# Patient Record
Sex: MALE | Employment: FULL TIME | URBAN - NONMETROPOLITAN AREA
[De-identification: names, ages, dates, MRNs, and addresses within clinical notes are randomized per-mention and may not be internally consistent; named-entity substitution may affect disease eponyms.]

---

## 2017-01-30 ENCOUNTER — OFFICE VISIT (OUTPATIENT)
Dept: MEDICAL GROUP | Facility: PHYSICIAN GROUP | Age: 72
End: 2017-01-30
Payer: MEDICARE

## 2017-01-30 VITALS
HEART RATE: 75 BPM | SYSTOLIC BLOOD PRESSURE: 134 MMHG | WEIGHT: 177 LBS | HEIGHT: 69 IN | DIASTOLIC BLOOD PRESSURE: 80 MMHG | BODY MASS INDEX: 26.22 KG/M2 | TEMPERATURE: 98.2 F | OXYGEN SATURATION: 98 %

## 2017-01-30 DIAGNOSIS — N18.30 CHRONIC KIDNEY DISEASE (CKD), STAGE III (MODERATE) (HCC): ICD-10-CM

## 2017-01-30 DIAGNOSIS — Z00.00 MEDICARE ANNUAL WELLNESS VISIT, SUBSEQUENT: Primary | ICD-10-CM

## 2017-01-30 DIAGNOSIS — E55.9 VITAMIN D DEFICIENCY DISEASE: ICD-10-CM

## 2017-01-30 DIAGNOSIS — R79.89 ELEVATED TSH: ICD-10-CM

## 2017-01-30 DIAGNOSIS — K76.0 FATTY LIVER: ICD-10-CM

## 2017-01-30 DIAGNOSIS — N20.0 RECURRENT NEPHROLITHIASIS: Chronic | ICD-10-CM

## 2017-01-30 DIAGNOSIS — Z87.19 HISTORY OF GASTROESOPHAGEAL REFLUX (GERD): ICD-10-CM

## 2017-01-30 DIAGNOSIS — E78.1 HYPERTRIGLYCERIDEMIA: ICD-10-CM

## 2017-01-30 PROCEDURE — G8510 SCR DEP NEG, NO PLAN REQD: HCPCS | Performed by: NURSE PRACTITIONER

## 2017-01-30 PROCEDURE — G0439 PPPS, SUBSEQ VISIT: HCPCS | Performed by: NURSE PRACTITIONER

## 2017-01-30 PROCEDURE — 3045F PR MOST RECENT HEMOGLOBIN A1C LEVEL 7.0-9.0%: CPT | Performed by: NURSE PRACTITIONER

## 2017-01-30 PROCEDURE — 1036F TOBACCO NON-USER: CPT | Performed by: NURSE PRACTITIONER

## 2017-01-30 ASSESSMENT — PATIENT HEALTH QUESTIONNAIRE - PHQ9: CLINICAL INTERPRETATION OF PHQ2 SCORE: 0

## 2017-01-30 NOTE — ASSESSMENT & PLAN NOTE
Chronic condition managed with current medical regimen  Stable per review  Labs reviewed    Continue with current meds  Followed by Estela Boland M.D..

## 2017-01-30 NOTE — ASSESSMENT & PLAN NOTE
10/2016  GFR If  55 (A) >60 mL/min/1.73 m 2 Final      GFR If Non African American 45 (A) >60 mL/min/1.73 m 2 Final         Bun 21 8 - 22 mg/dL Final      Creatinine 1.52 (H) 0.50 - 1.40 mg/dL Final     Chronic condition managed with current medical regimen  Followed by Estela Boland M.D..

## 2017-01-30 NOTE — PATIENT INSTRUCTIONS
Continue with care through Estela Boland M.D..  Next Medicare Annual Wellness Visit is due in one year.    Continue care with specialists you are seeing for your chronic problems.    Attached is some preventative information for you to read.          Fall Prevention and Home Safety  Falls cause injuries and can affect all age groups. It is possible to prevent falls.   HOW TO PREVENT FALLS  · Wear shoes with rubber soles that do not have an opening for your toes.   · Keep the inside and outside of your house well lit.   · Use night lights throughout your home.   · Remove clutter from floors.   · Clean up floor spills.   · Remove throw rugs or fasten them to the floor with carpet tape.   · Do not place electrical cords across pathways.   · Put grab bars by your tub, shower, and toilet. Do not use towel bars as grab bars.   · Put handrails on both sides of the stairway. Fix loose handrails.   · Do not climb on stools or stepladders, if possible.   · Do not wax your floors.   · Repair uneven or unsafe sidewalks, walkways, or stairs.   · Keep items you use a lot within reach.   · Be aware of pets.   · Keep emergency numbers next to the telephone.   · Put smoke detectors in your home and near bedrooms.   Ask your doctor what other things you can do to prevent falls.  Document Released: 10/14/2010 Document Revised: 06/18/2013 Document Reviewed: 03/19/2013  ExitCare® Patient Information ©2013 [employer].    Exercise to Stay Healthy      Exercise helps you become and stay healthy.  EXERCISE IDEAS AND TIPS  Choose exercises that:  · You enjoy.   · Fit into your day.   You do not need to exercise really hard to be healthy. You can do exercises at a slow or medium level and stay healthy. You can:  · Stretch before and after working out.   · Try yoga, Pilates, or erick chi.   · Lift weights.   · Walk fast, swim, jog, run, climb stairs, bicycle, dance, or rollerskate.   · Take aerobic classes.   Exercises that burn about 150  calories:  · Running 1 ½ miles in 15 minutes.   · Playing volleyball for 45 to 60 minutes.   · Washing and waxing a car for 45 to 60 minutes.   · Playing touch football for 45 minutes.   · Walking 1 ¾ miles in 35 minutes.   · Pushing a stroller 1 ½ miles in 30 minutes.   · Playing basketball for 30 minutes.   · Raking leaves for 30 minutes.   · Bicycling 5 miles in 30 minutes.   · Walking 2 miles in 30 minutes.   · Dancing for 30 minutes.   · Shoveling snow for 15 minutes.   · Swimming laps for 20 minutes.   · Walking up stairs for 15 minutes.   · Bicycling 4 miles in 15 minutes.   · Gardening for 30 to 45 minutes.   · Jumping rope for 15 minutes.   · Washing windows or floors for 45 to 60 minutes.     One suggestion is to start walking for 10 minutes after each meal.  This will help with digestion and help you to metabolize your meal.       For Weight Loss -   Recommend portion sizes with more fruits/vegetables/high fiber foods.  Stay away from white bread/pastas/white rice/white potatoes.  Increase Fluid intake to 6-8 glasses of water daily.   Eliminate soda's (diet and regular) from your fluid intake.      Document Released: 01/20/2012 Document Revised: 03/11/2013 Document Reviewed: 01/20/2012  ExitCare® Patient Information ©2013 [employer].    Fat and Cholesterol Control Diet  Cholesterol is a wax-like substance. It comes from your liver and is found in certain foods. There is good (HDL) and bad (LDL) cholesterol. Too much cholesterol in your blood can affect your heart. Certain foods can lower or raise your cholesterol. Eat foods that are low in cholesterol.  Saturated and trans fats are bad fats found in foods that will raise your cholesterol. Do not eat foods that are high in saturated and trans fats.  FOODS HIGHER IN SATURATED AND TRANS FATS  · Dairy products, such as whole milk, eggs, cheese, cream, and butter.   · Fatty meats, such as hot dogs, sausage, and salami.   · Fried foods.   · Trans fats which  are found in margarine and pre-made cookies, crackers, and baked goods.   · Tropical oils, such as coconut and palm oils.   Read package labels at the store. Do not buy products that use saturated or trans fats or hydrogenated oils. Find foods labeled:  · Low-fat.   · Low-saturated fat.   · Trans-fat-free.   · Low-cholesterol.   FOODS LOWER IN CHOLESTEROL  ·  Fruit.   · Vegetables.   · Beans, peas, and lentils.   · Fish.   · Lean meat, such as chicken (without skin) or ground turkey.   · Grains, such as barley, rice, couscous, bulgur wheat, and pasta.   · Heart-healthy tub margarine.   PREPARING YOUR FOOD  · Broil, bake, steam, or roast foods. Do not cope food.   · Use non-stick cooking sprays.   · Use lemon or herbs to flavor food instead of using butter or stick margarine.   · Use nonfat yogurt, salsa, or low-fat dressings for salads.   Document Released: 06/18/2013 Document Reviewed: 06/18/2013  ExitCare® Patient Information ©2013 [employer].    Recommend annual flu vaccine.  Next due in Fall, 2015.  If you decide not to have the flu vaccine, recommend good handwashing and staying out of crowds during flu season.        Basic Carbohydrate Counting for Diabetes Mellitus  Carbohydrate counting is a method for keeping track of the amount of carbohydrates you eat. Eating carbohydrates naturally increases the level of sugar (glucose) in your blood, so it is important for you to know the amount that is okay for you to have in every meal. Carbohydrate counting helps keep the level of glucose in your blood within normal limits. The amount of carbohydrates allowed is different for every person. A dietitian can help you calculate the amount that is right for you. Once you know the amount of carbohydrates you can have, you can count the carbohydrates in the foods you want to eat.  Carbohydrates are found in the following foods:  · Grains, such as breads and cereals.  · Dried beans and soy products.  · Starchy vegetables,  "such as potatoes, peas, and corn.  · Fruit and fruit juices.  · Milk and yogurt.  · Sweets and snack foods, such as cake, cookies, candy, chips, soft drinks, and fruit drinks.  CARBOHYDRATE COUNTING  There are two ways to count the carbohydrates in your food. You can use either of the methods or a combination of both.  Reading the \"Nutrition Facts\" on Packaged Food  The \"Nutrition Facts\" is an area that is included on the labels of almost all packaged food and beverages in the [place]. It includes the serving size of that food or beverage and information about the nutrients in each serving of the food, including the grams (g) of carbohydrate per serving.   Decide the number of servings of this food or beverage that you will be able to eat or drink. Multiply that number of servings by the number of grams of carbohydrate that is listed on the label for that serving. The total will be the amount of carbohydrates you will be having when you eat or drink this food or beverage.  Learning Standard Serving Sizes of Food  When you eat food that is not packaged or does not include \"Nutrition Facts\" on the label, you need to measure the servings in order to count the amount of carbohydrates. A serving of most carbohydrate-rich foods contains about 15 g of carbohydrates. The following list includes serving sizes of carbohydrate-rich foods that provide 15 g of carbohydrate per serving:    · 1 slice of bread (1 oz) or 1 six-inch tortilla.    · ½ of a hamburger bun or English muffin.  · 4-6 crackers.  · ¾ cup unsweetened dry cereal.    · ½ cup hot cereal.  ·  cup rice or pasta.    · ½ cup mashed potatoes or ¼ of a large baked potato.  · 1 cup fresh fruit or one small piece of fruit.    · ½ cup canned or frozen fruit or fruit juice.  · 1 cup milk.  ·  cup plain fat-free yogurt or yogurt sweetened with artificial sweeteners.  · ½ cup cooked dried beans or starchy vegetable, such as peas, corn, or potatoes.    Decide the number " of standard-size servings that you will eat. Multiply that number of servings by 15 (the grams of carbohydrates in that serving). For example, if you eat 2 cups of strawberries, you will have eaten 2 servings and 30 g of carbohydrates (2 servings x 15 g = 30 g). For foods such as soups and casseroles, in which more than one food is mixed in, you will need to count the carbohydrates in each food that is included.  EXAMPLE OF CARBOHYDRATE COUNTING  Sample Dinner   · 3 oz chicken breast.  ·  cup of brown rice.  · ½ cup of corn.  · 1 cup milk.    · 1 cup strawberries with sugar-free whipped topping.    Carbohydrate Calculation   Step 1: Identify the foods that contain carbohydrates:   · Rice.    · Corn.    · Milk.    · Strawberries.  Step 2: Calculate the number of servings eaten of each:   · 2 servings of rice.    · 1 serving of corn.    · 1 serving of milk.    · 1 serving of strawberries.  Step 3:  Multiply each of those number of servings by 15 g:   · 2 servings of rice x 15 g = 30 g.    · 1 serving of corn x 15 g = 15 g.    · 1 serving of milk x 15 g = 15 g.    · 1 serving of strawberries x 15 g = 15 g.  Step 4: Add together all of the amounts to find the total grams of carbohydrates eaten: 30 g + 15 g + 15 g + 15 g = 75 g.     This information is not intended to replace advice given to you by your health care provider. Make sure you discuss any questions you have with your health care provider.     Document Released: 12/18/2006 Document Revised: 01/08/2016 Document Reviewed: 11/14/2014  [employer] Interactive Patient Education ©2016 [employer]

## 2017-01-30 NOTE — ASSESSMENT & PLAN NOTE
"Last occurrence within thde past wk, states passing \"sm stones\" pm his own  Believes reason for recent freq stones is due to dehydration due to diarrhea from metformin, pt is encouraged to contact Estela Boland M.D.   "

## 2017-01-30 NOTE — ASSESSMENT & PLAN NOTE
Chronic condition managed with current medical regimen  Home fasting glucose  Has had an improved A1C   Continue with current meds, states has diarrhea from metformin, encouraged to contact Estela Boland M.D.  Followed by Estela Boland M.D..

## 2017-01-30 NOTE — MR AVS SNAPSHOT
"        Rai Leija   2017 9:20 AM   Office Visit   MRN: [id number]    Department:  [facility]   Dept Phone:  [phone]    Description:  Male : 1945   Provider:  SUZETTE Constantino           Reason for Visit     Annual Exam subsequent      Allergies as of 2017     Allergen Noted Reactions    Ace Inhibitors 2011       Cough        You were diagnosed with     Medicare annual wellness visit, subsequent   [539434]  -  Primary     Recurrent nephrolithiasis   [975860]       Hypertriglyceridemia   [811464]       Uncontrolled type 2 diabetes mellitus with stage 3 chronic kidney disease, without long-term current use of insulin (CMS-[facility])   [6744739]       Fatty liver   [169202]       Vitamin D deficiency disease   [200256]       Chronic kidney disease (CKD), stage III (moderate)   [703713]       Elevated TSH   [625180]       History of gastroesophageal reflux (GERD)   [725536]         Vital Signs     Blood Pressure Pulse Temperature Height Weight Body Mass Index    134/80 mmHg 75 36.8 °C (98.2 °F) 1.753 m (5' 9.02\") 80.287 kg (177 lb) 26.13 kg/m2    Oxygen Saturation Smoking Status                98% Former Smoker          Basic Information     Date Of Birth Sex Race Ethnicity Preferred Language    1945 Male Unknown Unknown English      Your appointments     2017  7:40 AM   Established Patient with SUZETTE Hebert   [address] [address] [phone]           You will be receiving a confirmation call a few days before your appointment from our automated call confirmation system.              Problem List              ICD-10-CM Priority Class Noted - Resolved    Hyperlipidemia E78.5   2010 - Present    Hypertriglyceridemia E78.1   2010 - Present    Uncontrolled type 2 diabetes mellitus with stage 3 chronic kidney disease, without long-term current use of insulin (CMS-[facility]) E11.22, " E11.65, N18.3   12/1/2010 - Present    Fatty liver K76.0   5/25/2011 - Present    Vitamin D deficiency disease E55.9   8/28/2014 - Present    History of gastroesophageal reflux (GERD) Z87.19   2/11/2015 - Present    Recurrent nephrolithiasis (Chronic) N20.0   5/13/2015 - Present    Chronic kidney disease (CKD), stage III (moderate) N18.3   10/6/2016 - Present    Elevated TSH R94.6   10/6/2016 - Present      Health Maintenance        Date Due Completion Dates    DIABETES MONOFILAMENT / LE EXAM 5/13/2016 5/13/2015 (Done), 4/1/2014 (Done), 3/21/2013 (N/S)    Override on 5/13/2015: Done    Override on 4/1/2014: Done (4/4)    Override on 3/21/2013: (N/S)    IMM INFLUENZA (1) 1/10/2018 (Originally 9/1/2016) ---    IMM DTaP/Tdap/Td Vaccine (1 - Tdap) 1/10/2018 (Originally 1/16/1962) ---    IMM ZOSTER VACCINE 1/10/2018 (Originally 1/16/2003) ---    A1C SCREENING 4/4/2017 10/4/2016, 6/24/2016, 10/23/2015, 5/7/2015, 2/7/2015, 8/19/2014, 3/24/2014, 9/18/2013, 3/15/2013, 8/27/2012, 5/17/2012, 2/22/2012, 11/21/2011, 8/17/2011, 2/2/2011, 11/22/2010    FASTING LIPID PROFILE 6/24/2017 6/24/2016, 5/7/2015, 8/19/2014, 3/24/2014, 9/18/2013, 3/15/2013, 5/17/2012, 2/22/2012, 11/21/2011, 8/17/2011, 5/9/2011, 2/2/2011, 11/4/2010    URINE ACR / MICROALBUMIN 6/24/2017 6/24/2016, 8/19/2014, 9/18/2013, 3/15/2013, 8/17/2011, 5/9/2011, 2/2/2011    SERUM CREATININE 10/4/2017 10/4/2016, 6/24/2016, 10/23/2015, 5/7/2015, 2/7/2015, 8/19/2014, 5/7/2014, 3/24/2014, 9/18/2013, 3/15/2013, 8/27/2012, 5/17/2012, 2/22/2012, 11/21/2011, 8/17/2011, 5/9/2011, 2/2/2011, 11/4/2010    IMM PNEUMOCOCCAL 65+ (ADULT) LOW/MEDIUM RISK SERIES (2 of 2 - PPSV23) 10/6/2017 10/6/2016    RETINAL SCREENING 11/17/2017 11/17/2016, 10/20/2015 (Done), 3/1/2014 (Done), 1/21/2013 (N/S)    Override on 10/20/2015: Done    Override on 3/1/2014: Done    Override on 1/21/2013: (N/S)    COLONOSCOPY 3/21/2023 3/21/2013 (N/S)    Override on 3/21/2013: (N/S) (FIT test to be sent)            Current Immunizations     13-VALENT PCV PREVNAR 10/6/2016      Below and/or attached are the medications your provider expects you to take. Review all of your home medications and newly ordered medications with your provider and/or pharmacist. Follow medication instructions as directed by your provider and/or pharmacist. Please keep your medication list with you and share with your provider. Update the information when medications are discontinued, doses are changed, or new medications (including over-the-counter products) are added; and carry medication information at all times in the event of emergency situations     Allergies:  ACE INHIBITORS - (reactions not documented)               Medications  Valid as of: January 30, 2017 - 10:58 AM    Generic Name Brand Name Tablet Size Instructions for use    Aspirin (Tab) aspirin 81 MG Take 81 mg by mouth every day.        Cholecalciferol (Tab) cholecalciferol 1000 UNIT Take 1,000 Units by mouth every day.        Fenofibrate Micronized (Cap) LOFIBRA 134 MG TAKE 1 CAPSULE BY MOUTH EVERY MORNING BEFORE BREAKFAST        Glimepiride (Tab) AMARYL 2 MG Take 1 Tab by mouth every morning.        HydroCHLOROthiazide (Tab) HYDRODIURIL 12.5 MG Take 1 Tab by mouth every day.        MetFORMIN HCl (TABLET SR 24 HR) GLUCOPHAGE  MG Take 2 Tabs by mouth 2 times a day.        Omega-3 Fatty Acids (Cap) fish oil 1000 MG Take 1,000 mg by mouth 3 times a day, with meals.        Simvastatin (Tab) ZOCOR 40 MG TAKE 1 TABLET BY MOUTH EVERY EVENING        .                 Medicines prescribed today were sent to:     [facility] [id number] [place] [address] [address] AT [place] [address] [address] [place] [id number] [facility] [address]    Phone: [phone] Fax: [phone]    Open 24 Hours?: No      Medication refill instructions:       If your prescription bottle indicates you have medication refills left, it is not necessary to call your provider’s office. Please  contact your pharmacy and they will refill your medication.    If your prescription bottle indicates you do not have any refills left, you may request refills at any time through one of the following ways: The online Mix [employer] Meet system (except Urgent Care), by calling your provider’s office, or by asking your pharmacy to contact your provider’s office with a refill request. Medication refills are processed only during regular business hours and may not be available until the next business day. Your provider may request additional information or to have a follow-up visit with you prior to refilling your medication.   *Please Note: Medication refills are assigned a new Rx number when refilled electronically. Your pharmacy may indicate that no refills were authorized even though a new prescription for the same medication is available at the pharmacy. Please request the medicine by name with the pharmacy before contacting your provider for a refill.        Instructions    Continue with care through Estela Boland M.D..  Next Medicare Annual Wellness Visit is due in one year.    Continue care with specialists you are seeing for your chronic problems.    Attached is some preventative information for you to read.          Fall Prevention and Home Safety  Falls cause injuries and can affect all age groups. It is possible to prevent falls.   HOW TO PREVENT FALLS  · Wear shoes with rubber soles that do not have an opening for your toes.   · Keep the inside and outside of your house well lit.   · Use night lights throughout your home.   · Remove clutter from floors.   · Clean up floor spills.   · Remove throw rugs or fasten them to the floor with carpet tape.   · Do not place electrical cords across pathways.   · Put grab bars by your tub, shower, and toilet. Do not use towel bars as grab bars.   · Put handrails on both sides of the stairway. Fix loose handrails.   · Do not climb on stools or stepladders, if possible.   · Do not  wax your floors.   · Repair uneven or unsafe sidewalks, walkways, or stairs.   · Keep items you use a lot within reach.   · Be aware of pets.   · Keep emergency numbers next to the telephone.   · Put smoke detectors in your home and near bedrooms.   Ask your doctor what other things you can do to prevent falls.  Document Released: 10/14/2010 Document Revised: 06/18/2013 Document Reviewed: 03/19/2013  ExitCare® Patient Information ©2013 [employer] [facility].    Exercise to Stay Healthy      Exercise helps you become and stay healthy.  EXERCISE IDEAS AND TIPS  Choose exercises that:  · You enjoy.   · Fit into your day.   You do not need to exercise really hard to be healthy. You can do exercises at a slow or medium level and stay healthy. You can:  · Stretch before and after working out.   · Try yoga, Pilates, or erick chi.   · Lift weights.   · Walk fast, swim, jog, run, climb stairs, bicycle, dance, or rollerskate.   · Take aerobic classes.   Exercises that burn about 150 calories:  · Running 1 ½ miles in 15 minutes.   · Playing volleyball for 45 to 60 minutes.   · Washing and waxing a car for 45 to 60 minutes.   · Playing touch football for 45 minutes.   · Walking 1 ¾ miles in 35 minutes.   · Pushing a stroller 1 ½ miles in 30 minutes.   · Playing basketball for 30 minutes.   · Raking leaves for 30 minutes.   · Bicycling 5 miles in 30 minutes.   · Walking 2 miles in 30 minutes.   · Dancing for 30 minutes.   · Shoveling snow for 15 minutes.   · Swimming laps for 20 minutes.   · Walking up stairs for 15 minutes.   · Bicycling 4 miles in 15 minutes.   · Gardening for 30 to 45 minutes.   · Jumping rope for 15 minutes.   · Washing windows or floors for 45 to 60 minutes.     One suggestion is to start walking for 10 minutes after each meal.  This will help with digestion and help you to metabolize your meal.       For Weight Loss -   Recommend portion sizes with more fruits/vegetables/high fiber foods.  Stay away from white  bread/pastas/white rice/white potatoes.  Increase Fluid intake to 6-8 glasses of water daily.   Eliminate soda's (diet and regular) from your fluid intake.      Document Released: 01/20/2012 Document Revised: 03/11/2013 Document Reviewed: 01/20/2012  [facility]® Patient Information ©2013 [employer] [facility].    Fat and Cholesterol Control Diet  Cholesterol is a wax-like substance. It comes from your liver and is found in certain foods. There is good (HDL) and bad (LDL) cholesterol. Too much cholesterol in your blood can affect your heart. Certain foods can lower or raise your cholesterol. Eat foods that are low in cholesterol.  Saturated and trans fats are bad fats found in foods that will raise your cholesterol. Do not eat foods that are high in saturated and trans fats.  FOODS HIGHER IN SATURATED AND TRANS FATS  · Dairy products, such as whole milk, eggs, cheese, cream, and butter.   · Fatty meats, such as hot dogs, sausage, and salami.   · Fried foods.   · Trans fats which are found in margarine and pre-made cookies, crackers, and baked goods.   · Tropical oils, such as coconut and palm oils.   Read package labels at the store. Do not buy products that use saturated or trans fats or hydrogenated oils. Find foods labeled:  · Low-fat.   · Low-saturated fat.   · Trans-fat-free.   · Low-cholesterol.   FOODS LOWER IN CHOLESTEROL  ·  Fruit.   · Vegetables.   · Beans, peas, and lentils.   · Fish.   · Lean meat, such as chicken (without skin) or ground turkey.   · Grains, such as barley, rice, couscous, bulgur wheat, and pasta.   · Heart-healthy tub margarine.   PREPARING YOUR FOOD  · Broil, bake, steam, or roast foods. Do not cope food.   · Use non-stick cooking sprays.   · Use lemon or herbs to flavor food instead of using butter or stick margarine.   · Use nonfat yogurt, salsa, or low-fat dressings for salads.   Document Released: 06/18/2013 Document Reviewed: 06/18/2013  ExitCare® Patient Information ©2013 [employer].    Recommend annual flu vaccine.  Next due in Fall, 2015.  If you decide not to have the flu vaccine, recommend good handwashing and staying out of crowds during flu season.        Basic Carbohydrate Counting for Diabetes Mellitus  Carbohydrate counting is a method for keeping track of the amount of carbohydrates you eat. Eating carbohydrates naturally increases the level of sugar (glucose) in your blood, so it is important for you to know the amount that is okay for you to have in every meal. Carbohydrate counting helps keep the level of glucose in your blood within normal limits. The amount of carbohydrates allowed is different for every person. A dietitian can help you calculate the amount that is right for you. Once you know the amount of carbohydrates you can have, you can count the carbohydrates in the foods you want to eat.  Carbohydrates are found in the following foods:  · Grains, such as breads and cereals.  · Dried beans and soy products.  · Starchy vegetables, such as potatoes, peas, and corn.  · Fruit and fruit juices.  · Milk and yogurt.  · Sweets and snack foods, such as cake, cookies, candy, chips, soft drinks, and fruit drinks.  CARBOHYDRATE COUNTING  There are two ways to count the carbohydrates in your food. You can use either of the methods or a combination of both.  Reading the \"Nutrition Facts\" on Packaged Food  The \"Nutrition Facts\" is an area that is included on the labels of almost all packaged food and beverages in the [place]. It includes the serving size of that food or beverage and information about the nutrients in each serving of the food, including the grams (g) of carbohydrate per serving.   Decide the number of servings of this food or beverage that you will be able to eat or drink. Multiply that number of servings by the number of grams of carbohydrate that is listed on the label for that serving. The total will be the amount of carbohydrates you will be having when you " "eat or drink this food or beverage.  Learning Standard Serving Sizes of Food  When you eat food that is not packaged or does not include \"Nutrition Facts\" on the label, you need to measure the servings in order to count the amount of carbohydrates. A serving of most carbohydrate-rich foods contains about 15 g of carbohydrates. The following list includes serving sizes of carbohydrate-rich foods that provide 15 g of carbohydrate per serving:    · 1 slice of bread (1 oz) or 1 six-inch tortilla.    · ½ of a hamburger bun or English muffin.  · 4-6 crackers.  · ¾ cup unsweetened dry cereal.    · ½ cup hot cereal.  ·  cup rice or pasta.    · ½ cup mashed potatoes or ¼ of a large baked potato.  · 1 cup fresh fruit or one small piece of fruit.    · ½ cup canned or frozen fruit or fruit juice.  · 1 cup milk.  ·  cup plain fat-free yogurt or yogurt sweetened with artificial sweeteners.  · ½ cup cooked dried beans or starchy vegetable, such as peas, corn, or potatoes.    Decide the number of standard-size servings that you will eat. Multiply that number of servings by 15 (the grams of carbohydrates in that serving). For example, if you eat 2 cups of strawberries, you will have eaten 2 servings and 30 g of carbohydrates (2 servings x 15 g = 30 g). For foods such as soups and casseroles, in which more than one food is mixed in, you will need to count the carbohydrates in each food that is included.  EXAMPLE OF CARBOHYDRATE COUNTING  Sample Dinner   · 3 oz chicken breast.  ·  cup of brown rice.  · ½ cup of corn.  · 1 cup milk.    · 1 cup strawberries with sugar-free whipped topping.    Carbohydrate Calculation   Step 1: Identify the foods that contain carbohydrates:   · Rice.    · Corn.    · Milk.    · Strawberries.  Step 2: Calculate the number of servings eaten of each:   · 2 servings of rice.    · 1 serving of corn.    · 1 serving of milk.    · 1 serving of strawberries.  Step 3:  Multiply each of those number of servings by " 15 g:   · 2 servings of rice x 15 g = 30 g.    · 1 serving of corn x 15 g = 15 g.    · 1 serving of milk x 15 g = 15 g.    · 1 serving of strawberries x 15 g = 15 g.  Step 4: Add together all of the amounts to find the total grams of carbohydrates eaten: 30 g + 15 g + 15 g + 15 g = 75 g.     This information is not intended to replace advice given to you by your health care provider. Make sure you discuss any questions you have with your health care provider.     Document Released: 12/18/2006 Document Revised: 01/08/2016 Document Reviewed: 11/14/2014  [employer] Interactive Patient Education ©2016 [employer]            [employer] Access Code: Activation code not generated  Current [employer] Status: Active

## 2017-01-30 NOTE — ASSESSMENT & PLAN NOTE
Chronic condition managed with current medical regimen  Stable per review  Followed by Estela Boland M.D..

## 2017-01-30 NOTE — ASSESSMENT & PLAN NOTE
Chronic condition managed with current medical regimen  Stable per review   Continue with current meds  Followed by Estela Boland M.D..

## 2017-01-30 NOTE — PROGRESS NOTES
CC:   Medicare Annual Wellness Visit    HPI:  Rai is a 72 y.o. here for Medicare Annual Wellness Visit    Patient Active Problem List    Diagnosis Date Noted   • Chronic kidney disease (CKD), stage III (moderate) 10/06/2016   • Elevated TSH 10/06/2016   • Recurrent nephrolithiasis 05/13/2015   • History of gastroesophageal reflux (GERD) 02/11/2015   • Vitamin D deficiency disease 08/28/2014   • Fatty liver 05/25/2011   • Hyperlipidemia 12/01/2010   • Hypertriglyceridemia 12/01/2010   • Uncontrolled type 2 diabetes mellitus with stage 3 chronic kidney disease, without long-term current use of insulin (CMS-[facility]) 12/01/2010     Current Outpatient Prescriptions   Medication Sig Dispense Refill   • fenofibrate micronized (LOFIBRA) 134 MG capsule TAKE 1 CAPSULE BY MOUTH EVERY MORNING BEFORE BREAKFAST 90 Cap 3   • hydrochlorothiazide (HYDRODIURIL) 12.5 MG tablet Take 1 Tab by mouth every day. 90 Tab 3   • metformin ER (GLUCOPHAGE XR) 500 MG TABLET SR 24 HR Take 2 Tabs by mouth 2 times a day. 360 Tab 3   • glimepiride (AMARYL) 2 MG Tab Take 1 Tab by mouth every morning. 90 Tab 3   • simvastatin (ZOCOR) 40 MG Tab TAKE 1 TABLET BY MOUTH EVERY EVENING 90 Tab 3   • vitamin D (CHOLECALCIFEROL) 1000 UNIT Tab Take 1,000 Units by mouth every day.     • Omega-3 Fatty Acids (FISH OIL) 1000 MG Cap capsule Take 1,000 mg by mouth 3 times a day, with meals.     • aspirin 81 MG tablet Take 81 mg by mouth every day.       No current facility-administered medications for this visit.      Current supplements: no  Chronic narcotic pain medicines: no  Allergies: Ace inhibitors  Exercise: yes active  Current social contact/activities: Has support of family and friends      Screening:  Depression Screening    Little interest or pleasure in doing things?  0 - not at all  Feeling down, depressed , or hopeless? 0 - not at all  Trouble falling or staying asleep, or sleeping too much?     Feeling tired or having little energy?     Poor appetite or  overeating?     Feeling bad about yourself - or that you are a failure or have let yourself or your family down?    Trouble concentrating on things, such as reading the newspaper or watching television?    Moving or speaking so slowly that other people could have noticed.  Or the opposite - being so fidgety or restless that you have been moving around a lot more than usual?     Thoughts that you would be better off dead, or of hurting yourself?     Patient Health Questionnaire Score:      If depressive symptoms identified deferred to follow up visit unless specifically addressed in assesment and plan.      Screening for Cognitive Impairment    Three Minute Recall (banana, sunrise, fence)  3/3    Draw clock face with all 12 numbers set to the hand to show 10 minures past 11 o'clock  1 5  Cognitive concerns identified defferred for follow up unless specifically addressed in assesment and plan.    Fall Risk Assessment    Has the patient had two or more falls in the last year or any fall with injury in the last year?  No    Safety Assessment    Throw rugs on floor.  Yes  Handrails on all stairs.  Yes  Good lighting in all hallways.  Yes  Difficulty hearing.  No  Patient counseled about all safety risks that were identified.    Functional Assessment ADLs    Are there any barriers preventing you from cooking for yourself or meeting nutritional needs?  No.    Are there any barriers preventing you from driving safely or obtaining transportation?  No.    Are there any barriers preventing you from using a telephone or calling for help?  No.    Are there any barriers preventing you from shopping?  No.    Are there any barriers preventing you from taking care of your own finances?  No.    Are there any barriers preventing you from managing your medications?  No.    Are currently engaing any exercise or physical activity?  No.       Health Maintenance Summary                DIABETES MONOFILAMENT / LE EXAM Overdue 5/13/2016       Done 5/13/2015      Patient has more history with this topic...    Annual Wellness Visit Overdue 10/26/2016      Done 10/26/2015      Patient has more history with this topic...    IMM INFLUENZA Postponed 1/10/2018 Originally 9/1/2016. Patient Refused    IMM DTaP/Tdap/Td Vaccine Postponed 1/10/2018 Originally 1/16/1962. Patient Refused    IMM ZOSTER VACCINE Postponed 1/10/2018 Originally 1/16/2003. Patient Refused    A1C SCREENING Next Due 4/4/2017      Done 10/4/2016 HEMOGLOBIN A1C (A)     Patient has more history with this topic...    FASTING LIPID PROFILE Next Due 6/24/2017      Done 6/24/2016 LIPID PROFILE (A)     Patient has more history with this topic...    URINE ACR / MICROALBUMIN Next Due 6/24/2017      Done 6/24/2016 MICROALBUMIN CREAT RATIO URINE     Patient has more history with this topic...    SERUM CREATININE Next Due 10/4/2017      Done 10/4/2016 COMP METABOLIC PANEL (A)     Patient has more history with this topic...    IMM PNEUMOCOCCAL 65+ (ADULT) LOW/MEDIUM RISK SERIES Next Due 10/6/2017      Done 10/6/2016 Imm Admin: Pneumococcal Conjugate Vaccine (Prevnar/PCV-13)    RETINAL SCREENING Next Due 11/17/2017      Done 11/17/2016 REFERRAL FOR RETINAL SCREENING EXAM     Patient has more history with this topic...    COLONOSCOPY Next Due 3/21/2023      Not specified 3/21/2013 FIT test to be sent          Patient Care Team:  Estela Boland M.D. as PCP - General  Daniel Ko M.D. as Consulting Physician (Urology)        Social History   Substance Use Topics   • Smoking status: Former Smoker -- 0.50 packs/day for 20 years     Types: Cigarettes     Quit date: 10/26/1986   • Smokeless tobacco: Never Used      Comment: quit 23 years ago   • Alcohol Use: 0.0 oz/week     0 Standard drinks or equivalent per week      Comment: very rarely       Family History   Problem Relation Age of Onset   • GI Father      polyps   • No Known Problems Mother      He  has a past medical history of Diabetes mellitus  "type II; Elevated BP (9/9/2010); Hyperlipidemia (12/1/2010); Hypertriglyceridemia (12/1/2010); Transaminitis (4/12/2011); Renal stone; and Hearing decreased.   Past Surgical History   Procedure Laterality Date   • Other       finger   • Tonsillectomy     • Lithotripsy       renal stone removal       ROS:    Ostomy or other tubes or amputations: no  Chronic oxygen use no  Last eye exam 2016  : Denies incontinence.   Gait: Uses no assistive device   Hearing excellent.    Dentition good    Exam: Blood pressure 134/80, pulse 75, temperature 36.8 °C (98.2 °F), height 1.753 m (5' 9.02\"), weight 80.287 kg (177 lb), SpO2 98 %. Body mass index is 26.13 kg/(m^2).  Alert, oriented in no acute distress.  Eye contact is good, speech goal directed, affect calm      Assessment and Plan. The following treatment and monitoring plan is recommended for all problems as listed below:   Recurrent nephrolithiasis  Last occurrence within thde past wk, states passing \"sm stones\" pm his own  Believes reason for recent freq stones is due to dehydration due to diarrhea from metformin, pt is encouraged to contact Estela Boland M.D.     Hyperlipidemia  Chronic condition managed with current medical regimen  Stable per review  Labs reviewed    Continue with current meds  Followed by Estela Boland M.D..        Hypertriglyceridemia  Chronic condition managed with current medical regimen  Stable per review  Labs reviewed    Continue with current meds  Followed by Estela Boland M.D..        Uncontrolled type 2 diabetes mellitus with stage 3 chronic kidney disease, without long-term current use of insulin (CMS-[facility])  Chronic condition managed with current medical regimen  Home fasting glucose  Has had an improved A1C   Continue with current meds, states has diarrhea from metformin, encouraged to contact Estela Boland M.D.  Followed by Estela Boland M.D..        Fatty liver  Chronic condition managed with current medical regimen  Stable per " review  Followed by Estela Boland M.D..        Vitamin D deficiency disease  Chronic condition managed with current medical regimen  Stable per review   Continue with current meds  Followed by Estela Boland M.D..        History of gastroesophageal reflux (GERD)  Chronic condition managed with current medical regimen  Stable per review  Per pt resolved with short run of OTC prilosec  Followed by Estela Boland M.D..        Chronic kidney disease (CKD), stage III (moderate)  10/2016  GFR If African American 55 (A) >60 mL/min/1.73 m 2 Final      GFR If Non African American 45 (A) >60 mL/min/1.73 m 2 Final         Bun 21 8 - 22 mg/dL Final      Creatinine 1.52 (H) 0.50 - 1.40 mg/dL Final     Chronic condition managed with current medical regimen  Followed by Estela Boland M.D..      Elevated TSH  10/2016     TSH 4.150 (H) 0.300 - 3.700 uIU/mL Final         Toenail bruise  Per pt resolved  Historical data        Health Care Screening recommendations reviewed with patient today: per Patient Instructions  DPA/Advanced directive: .has a living will with med will    Next office visit for recheck of chronic medical conditions is due with Estela Boland M.D. in 4/7/2017

## 2017-01-30 NOTE — ASSESSMENT & PLAN NOTE
Chronic condition managed with current medical regimen  Stable per review  Per pt resolved with short run of OTC prilosec  Followed by Estela Boland M.D..

## 2017-04-07 ENCOUNTER — TELEPHONE (OUTPATIENT)
Dept: URGENT CARE | Facility: PHYSICIAN GROUP | Age: 72
End: 2017-04-07

## 2017-04-07 NOTE — TELEPHONE ENCOUNTER
Pt came in at 800 for a 740 appoinmtent, pt stated he had not had his labs done, he stated that the paper stated 8 am but he did not bring in paper. Pt came in to rescedule and the next appointment for a N2U is July, pt was angry and left without stating if he wanted the appt. Or not

## 2017-04-10 ENCOUNTER — HOSPITAL ENCOUNTER (OUTPATIENT)
Dept: LAB | Facility: MEDICAL CENTER | Age: 72
End: 2017-04-10
Attending: INTERNAL MEDICINE
Payer: MEDICARE

## 2017-04-10 DIAGNOSIS — R79.89 ELEVATED TSH: ICD-10-CM

## 2017-04-10 DIAGNOSIS — E78.00 PURE HYPERCHOLESTEROLEMIA: ICD-10-CM

## 2017-04-10 LAB
ALBUMIN SERPL BCP-MCNC: 4.6 G/DL (ref 3.2–4.9)
ALBUMIN/GLOB SERPL: 1.4 G/DL
ALP SERPL-CCNC: 125 U/L (ref 30–99)
ALT SERPL-CCNC: 24 U/L (ref 2–50)
ANION GAP SERPL CALC-SCNC: 8 MMOL/L (ref 0–11.9)
AST SERPL-CCNC: 20 U/L (ref 12–45)
BILIRUB SERPL-MCNC: 0.5 MG/DL (ref 0.1–1.5)
BUN SERPL-MCNC: 31 MG/DL (ref 8–22)
CALCIUM SERPL-MCNC: 9.9 MG/DL (ref 8.5–10.5)
CHLORIDE SERPL-SCNC: 102 MMOL/L (ref 96–112)
CHOLEST SERPL-MCNC: 264 MG/DL (ref 100–199)
CO2 SERPL-SCNC: 25 MMOL/L (ref 20–33)
CREAT SERPL-MCNC: 1.42 MG/DL (ref 0.5–1.4)
EST. AVERAGE GLUCOSE BLD GHB EST-MCNC: 212 MG/DL
GFR SERPL CREATININE-BSD FRML MDRD: 49 ML/MIN/1.73 M 2
GLOBULIN SER CALC-MCNC: 3.2 G/DL (ref 1.9–3.5)
GLUCOSE SERPL-MCNC: 321 MG/DL (ref 65–99)
HBA1C MFR BLD: 9 % (ref 0–5.6)
HDLC SERPL-MCNC: ABNORMAL MG/DL
LDLC SERPL CALC-MCNC: ABNORMAL MG/DL
POTASSIUM SERPL-SCNC: 4 MMOL/L (ref 3.6–5.5)
PROT SERPL-MCNC: 7.8 G/DL (ref 6–8.2)
SODIUM SERPL-SCNC: 135 MMOL/L (ref 135–145)
T4 FREE SERPL-MCNC: 0.68 NG/DL (ref 0.53–1.43)
TRIGL SERPL-MCNC: 1135 MG/DL (ref 0–149)
TSH SERPL DL<=0.005 MIU/L-ACNC: 4.85 UIU/ML (ref 0.3–3.7)

## 2017-04-10 PROCEDURE — 80053 COMPREHEN METABOLIC PANEL: CPT

## 2017-04-10 PROCEDURE — 80061 LIPID PANEL: CPT

## 2017-04-10 PROCEDURE — 36415 COLL VENOUS BLD VENIPUNCTURE: CPT | Mod: GA

## 2017-04-10 PROCEDURE — 84443 ASSAY THYROID STIM HORMONE: CPT

## 2017-04-10 PROCEDURE — 84439 ASSAY OF FREE THYROXINE: CPT

## 2017-04-10 PROCEDURE — 83036 HEMOGLOBIN GLYCOSYLATED A1C: CPT | Mod: GA

## 2017-04-13 ENCOUNTER — OFFICE VISIT (OUTPATIENT)
Dept: MEDICAL GROUP | Facility: PHYSICIAN GROUP | Age: 72
End: 2017-04-13
Payer: MEDICARE

## 2017-04-13 VITALS
DIASTOLIC BLOOD PRESSURE: 88 MMHG | WEIGHT: 180 LBS | RESPIRATION RATE: 16 BRPM | HEART RATE: 65 BPM | TEMPERATURE: 98.4 F | HEIGHT: 68 IN | SYSTOLIC BLOOD PRESSURE: 138 MMHG | BODY MASS INDEX: 27.28 KG/M2 | OXYGEN SATURATION: 97 %

## 2017-04-13 DIAGNOSIS — E78.01 FAMILIAL HYPERCHOLESTEROLEMIA: ICD-10-CM

## 2017-04-13 DIAGNOSIS — E55.9 VITAMIN D DEFICIENCY DISEASE: ICD-10-CM

## 2017-04-13 DIAGNOSIS — R79.89 ELEVATED TSH: ICD-10-CM

## 2017-04-13 DIAGNOSIS — E78.1 HYPERTRIGLYCERIDEMIA: ICD-10-CM

## 2017-04-13 DIAGNOSIS — I10 ESSENTIAL HYPERTENSION: ICD-10-CM

## 2017-04-13 DIAGNOSIS — N20.0 RECURRENT NEPHROLITHIASIS: Chronic | ICD-10-CM

## 2017-04-13 DIAGNOSIS — R19.7 DIARRHEA, UNSPECIFIED TYPE: ICD-10-CM

## 2017-04-13 PROCEDURE — 1101F PT FALLS ASSESS-DOCD LE1/YR: CPT | Performed by: FAMILY MEDICINE

## 2017-04-13 PROCEDURE — 99214 OFFICE O/P EST MOD 30 MIN: CPT | Performed by: FAMILY MEDICINE

## 2017-04-13 PROCEDURE — G8432 DEP SCR NOT DOC, RNG: HCPCS | Performed by: FAMILY MEDICINE

## 2017-04-13 PROCEDURE — 1036F TOBACCO NON-USER: CPT | Performed by: FAMILY MEDICINE

## 2017-04-13 PROCEDURE — G8417 CALC BMI ABV UP PARAM F/U: HCPCS | Performed by: FAMILY MEDICINE

## 2017-04-13 PROCEDURE — 4040F PNEUMOC VAC/ADMIN/RCVD: CPT | Performed by: FAMILY MEDICINE

## 2017-04-13 PROCEDURE — 3045F PR MOST RECENT HEMOGLOBIN A1C LEVEL 7.0-9.0%: CPT | Performed by: FAMILY MEDICINE

## 2017-04-13 RX ORDER — GLIMEPIRIDE 2 MG/1
2 TABLET ORAL EVERY MORNING
Qty: 90 TAB | Refills: 3 | Status: SHIPPED | OUTPATIENT
Start: 2017-04-13 | End: 2018-02-22 | Stop reason: SDUPTHER

## 2017-04-13 RX ORDER — HYDROCHLOROTHIAZIDE 12.5 MG/1
12.5 TABLET ORAL DAILY
Qty: 90 TAB | Refills: 3 | Status: SHIPPED | OUTPATIENT
Start: 2017-04-13 | End: 2018-02-22

## 2017-04-13 RX ORDER — SIMVASTATIN 40 MG
TABLET ORAL
Qty: 90 TAB | Refills: 3 | Status: SHIPPED | OUTPATIENT
Start: 2017-04-13 | End: 2018-02-22 | Stop reason: SDUPTHER

## 2017-04-13 RX ORDER — FENOFIBRATE 134 MG/1
CAPSULE ORAL
Qty: 90 CAP | Refills: 3 | Status: SHIPPED | OUTPATIENT
Start: 2017-04-13 | End: 2018-02-22 | Stop reason: SDUPTHER

## 2017-04-13 ASSESSMENT — PATIENT HEALTH QUESTIONNAIRE - PHQ9: CLINICAL INTERPRETATION OF PHQ2 SCORE: 0

## 2017-04-13 NOTE — PROGRESS NOTES
Subjective:   Rai Leija is a 72 y.o. male here today for diabetes, hyperlipidemia and hypertrigliceridemia.   He stopped all his medications a month ago as he had gradually developed severe diarrhea which prevented him from even going out of the house. The diarrhea has since improved and has almost resolved.    Hyperlipidemia  Restart [id number] mg daily, reports no muscle cramping or weakness.    Hypertriglyceridemia  TG 1135 on 4/10/17   Restart statin, fenofibrate and fish oil.       Uncontrolled type 2 diabetes mellitus with stage 3 chronic kidney disease, without long-term current use of insulin (CMS-[facility])  A1c increased to 9 on 4/10/17. He stopped metformin and glimepiride for a month due to diarrhea. Diarrhea has almost resolved.  Restart glimepiride and adding januvia.    Vitamin D deficiency disease  Will recheck labs      Elevated TSH  Persistent elevation.  T4 is normal.   Will stabilize other issues and recheck TSH and T4 in 6 months.     HTN (hypertension)  Controlled on HCTZ    Diarrhea  Acute new problem. Started gradually. He stopped all medications a month ago and since then diarrhea has improved though not resolved completely. Occasional blood noticed in stool.   Diarrhea could be due to metformin so since it is improving, advised pt to start all other meds and monitor. If on repeat visit still has diarrhea or blood in stool will recommend a colonoscopy.          Current medicines (including changes today)  Current Outpatient Prescriptions   Medication Sig Dispense Refill   • aspirin 81 MG tablet Take 1 Tab by mouth every day. 100 Tab 3   • simvastatin (ZOCOR) 40 MG Tab TAKE 1 TABLET BY MOUTH EVERY EVENING 90 Tab 3   • fenofibrate micronized (LOFIBRA) 134 MG capsule TAKE 1 CAPSULE BY MOUTH EVERY MORNING BEFORE BREAKFAST 90 Cap 3   • glimepiride (AMARYL) 2 MG Tab Take 1 Tab by mouth every morning. 90 Tab 3   • hydrochlorothiazide (HYDRODIURIL) 12.5 MG tablet Take 1 Tab by mouth every day. 90  "Tab 3   • sitagliptin (JANUVIA) 50 MG Tab Take 1 Tab by mouth every day. 30 Tab 3   • metformin ER (GLUCOPHAGE XR) 500 MG TABLET SR 24 HR Take 2 Tabs by mouth 2 times a day. 360 Tab 3   • vitamin D (CHOLECALCIFEROL) 1000 UNIT Tab Take 1,000 Units by mouth every day.     • Omega-3 Fatty Acids (FISH OIL) 1000 MG Cap capsule Take 1,000 mg by mouth 3 times a day, with meals.       No current facility-administered medications for this visit.     He  has a past medical history of Diabetes mellitus type II; Elevated BP (9/9/2010); Hyperlipidemia (12/1/2010); Hypertriglyceridemia (12/1/2010); Transaminitis (4/12/2011); Renal stone; and Hearing decreased.    ROS   No chest pain, no shortness of breath, no abdominal pain       Objective:     Blood pressure 138/88, pulse 65, temperature 36.9 °C (98.4 °F), resp. rate 16, height 1.727 m (5' 7.99\"), weight 81.647 kg (180 lb), SpO2 97 %. Body mass index is 27.38 kg/(m^2).   Physical Exam:  Constitutional: Alert, no distress.  Skin: Warm, dry, good turgor, no rashes in visible areas.  Eye: Equal, round and reactive, conjunctiva clear, lids normal.  ENMT: Lips without lesions, good dentition, oropharynx clear.  Neck: Trachea midline, no masses, no thyromegaly. No cervical or supraclavicular lymphadenopathy  Respiratory: Unlabored respiratory effort, lungs clear to auscultation, no wheezes, no ronchi.  Cardiovascular: Normal S1, S2, no murmur, no edema.  Abdomen: Soft, non-tender, no masses, no hepatosplenomegaly.  Psych: Alert and oriented x3, normal affect and mood.        Assessment and Plan:   The following treatment plan was discussed  1. Diarrhea, unspecified type  Stop metformin. Will order stool studies and  Refer to GI for colonoscopy if not resolved at next visit. May need colonoscopy ordered for crc screening anyway.      2. Uncontrolled type 2 diabetes mellitus with complication, without long-term current use of insulin (CMS-[facility])  Stop metforin,, continue glimepiride and " start Januvia. Check labs  Continue aspirin and statin.  - glimepiride (AMARYL) 2 MG Tab; Take 1 Tab by mouth every morning.  Dispense: 90 Tab; Refill: 3  - sitagliptin (JANUVIA) 50 MG Tab; Take 1 Tab by mouth every day.  Dispense: 30 Tab; Refill: 3  - HEMOGLOBIN A1C; Future  - COMP METABOLIC PANEL; Future  - MICROALBUMIN CREAT RATIO URINE (CLINIC COLLECT); Future    3. Hypertriglyceridemia  Uncontrolled. Restart medication, check labs.  - fenofibrate micronized (LOFIBRA) 134 MG capsule; TAKE 1 CAPSULE BY MOUTH EVERY MORNING BEFORE BREAKFAST  Dispense: 90 Cap; Refill: 3    4. Essential hypertension  Controlled. Continue hydrochlorothiazide  Check labs  - CBC WITH DIFFERENTIAL; Future    5. Familial hypercholesterolemia  Uncontrolled. Restart simvastatin. Check labs  - simvastatin (ZOCOR) 40 MG Tab; TAKE 1 TABLET BY MOUTH EVERY EVENING  Dispense: 90 Tab; Refill: 3  - LIPID PROFILE; Future    6. Elevated TSH  Monitor with labs. If TSH persistently elevated, consider adding lowest dose of levothyroxine.  - TSH; Future  - FREE THYROXINE; Future    7. Recurrent nephrolithiasis  Continue good hydration        Followup: Return in about 3 months (around 7/13/2017) for Short.

## 2017-04-13 NOTE — ASSESSMENT & PLAN NOTE
A1c increased to 9 on 4/10/17. He stopped metformin and glimepiride for a month due to diarrhea. Diarrhea has almost resolved.  Restart glimepiride and adding januvia.

## 2017-04-13 NOTE — ASSESSMENT & PLAN NOTE
Persistent elevation.  T4 is normal.   Will stabilize other issues and recheck TSH and T4 in 6 months.

## 2017-04-13 NOTE — ASSESSMENT & PLAN NOTE
Acute new problem. Started gradually. He stopped all medications a month ago and since then diarrhea has improved though not resolved completely. Occasional blood noticed in stool.   Diarrhea could be due to metformin so since it is improving, advised pt to start all other meds and monitor. If on repeat visit still has diarrhea or blood in stool will recommend a colonoscopy.

## 2017-08-14 ENCOUNTER — HOSPITAL ENCOUNTER (OUTPATIENT)
Dept: LAB | Facility: MEDICAL CENTER | Age: 72
End: 2017-08-14
Attending: FAMILY MEDICINE
Payer: MEDICARE

## 2017-08-14 DIAGNOSIS — I10 ESSENTIAL HYPERTENSION: ICD-10-CM

## 2017-08-14 DIAGNOSIS — E78.01 FAMILIAL HYPERCHOLESTEROLEMIA: ICD-10-CM

## 2017-08-14 DIAGNOSIS — E55.9 VITAMIN D DEFICIENCY DISEASE: ICD-10-CM

## 2017-08-14 DIAGNOSIS — R79.89 ELEVATED TSH: ICD-10-CM

## 2017-08-14 LAB
ALBUMIN SERPL BCP-MCNC: 4.3 G/DL (ref 3.2–4.9)
ALBUMIN/GLOB SERPL: 1.3 G/DL
ALP SERPL-CCNC: 76 U/L (ref 30–99)
ALT SERPL-CCNC: 15 U/L (ref 2–50)
ANION GAP SERPL CALC-SCNC: 7 MMOL/L (ref 0–11.9)
AST SERPL-CCNC: 17 U/L (ref 12–45)
BASOPHILS # BLD AUTO: 0.8 % (ref 0–1.8)
BASOPHILS # BLD: 0.07 K/UL (ref 0–0.12)
BILIRUB SERPL-MCNC: 0.5 MG/DL (ref 0.1–1.5)
BUN SERPL-MCNC: 24 MG/DL (ref 8–22)
CALCIUM SERPL-MCNC: 9.5 MG/DL (ref 8.5–10.5)
CHLORIDE SERPL-SCNC: 107 MMOL/L (ref 96–112)
CHOLEST SERPL-MCNC: 122 MG/DL (ref 100–199)
CO2 SERPL-SCNC: 25 MMOL/L (ref 20–33)
CREAT SERPL-MCNC: 1.58 MG/DL (ref 0.5–1.4)
CREAT UR-MCNC: 92.1 MG/DL
EOSINOPHIL # BLD AUTO: 0.15 K/UL (ref 0–0.51)
EOSINOPHIL NFR BLD: 1.7 % (ref 0–6.9)
ERYTHROCYTE [DISTWIDTH] IN BLOOD BY AUTOMATED COUNT: 40.8 FL (ref 35.9–50)
EST. AVERAGE GLUCOSE BLD GHB EST-MCNC: 194 MG/DL
GFR SERPL CREATININE-BSD FRML MDRD: 43 ML/MIN/1.73 M 2
GLOBULIN SER CALC-MCNC: 3.2 G/DL (ref 1.9–3.5)
GLUCOSE SERPL-MCNC: 203 MG/DL (ref 65–99)
HBA1C MFR BLD: 8.4 % (ref 0–5.6)
HCT VFR BLD AUTO: 42.5 % (ref 42–52)
HDLC SERPL-MCNC: 28 MG/DL
HGB BLD-MCNC: 14.4 G/DL (ref 14–18)
IMM GRANULOCYTES # BLD AUTO: 0.04 K/UL (ref 0–0.11)
IMM GRANULOCYTES NFR BLD AUTO: 0.4 % (ref 0–0.9)
LDLC SERPL CALC-MCNC: 36 MG/DL
LYMPHOCYTES # BLD AUTO: 2.46 K/UL (ref 1–4.8)
LYMPHOCYTES NFR BLD: 27.4 % (ref 22–41)
MCH RBC QN AUTO: 29.4 PG (ref 27–33)
MCHC RBC AUTO-ENTMCNC: 33.9 G/DL (ref 33.7–35.3)
MCV RBC AUTO: 86.9 FL (ref 81.4–97.8)
MICROALBUMIN UR-MCNC: 1.3 MG/DL
MICROALBUMIN/CREAT UR: 14 MG/G (ref 0–30)
MONOCYTES # BLD AUTO: 0.78 K/UL (ref 0–0.85)
MONOCYTES NFR BLD AUTO: 8.7 % (ref 0–13.4)
NEUTROPHILS # BLD AUTO: 5.48 K/UL (ref 1.82–7.42)
NEUTROPHILS NFR BLD: 61 % (ref 44–72)
NRBC # BLD AUTO: 0 K/UL
NRBC BLD AUTO-RTO: 0 /100 WBC
PLATELET # BLD AUTO: 206 K/UL (ref 164–446)
PMV BLD AUTO: 11.2 FL (ref 9–12.9)
POTASSIUM SERPL-SCNC: 4.2 MMOL/L (ref 3.6–5.5)
PROT SERPL-MCNC: 7.5 G/DL (ref 6–8.2)
RBC # BLD AUTO: 4.89 M/UL (ref 4.7–6.1)
SODIUM SERPL-SCNC: 139 MMOL/L (ref 135–145)
TRIGL SERPL-MCNC: 289 MG/DL (ref 0–149)
WBC # BLD AUTO: 9 K/UL (ref 4.8–10.8)

## 2017-08-14 PROCEDURE — 84443 ASSAY THYROID STIM HORMONE: CPT

## 2017-08-14 PROCEDURE — 84439 ASSAY OF FREE THYROXINE: CPT

## 2017-08-14 PROCEDURE — 80053 COMPREHEN METABOLIC PANEL: CPT

## 2017-08-14 PROCEDURE — 80061 LIPID PANEL: CPT

## 2017-08-14 PROCEDURE — 83036 HEMOGLOBIN GLYCOSYLATED A1C: CPT | Mod: GA

## 2017-08-14 PROCEDURE — 82570 ASSAY OF URINE CREATININE: CPT

## 2017-08-14 PROCEDURE — 82043 UR ALBUMIN QUANTITATIVE: CPT

## 2017-08-14 PROCEDURE — 36415 COLL VENOUS BLD VENIPUNCTURE: CPT

## 2017-08-14 PROCEDURE — 85025 COMPLETE CBC W/AUTO DIFF WBC: CPT

## 2017-08-14 PROCEDURE — 82306 VITAMIN D 25 HYDROXY: CPT

## 2017-08-15 LAB
25(OH)D3 SERPL-MCNC: 20 NG/ML (ref 30–100)
T4 FREE SERPL-MCNC: 0.66 NG/DL (ref 0.53–1.43)
TSH SERPL DL<=0.005 MIU/L-ACNC: 3.8 UIU/ML (ref 0.3–3.7)

## 2017-08-17 ENCOUNTER — OFFICE VISIT (OUTPATIENT)
Dept: MEDICAL GROUP | Facility: PHYSICIAN GROUP | Age: 72
End: 2017-08-17
Payer: MEDICARE

## 2017-08-17 VITALS
WEIGHT: 180 LBS | TEMPERATURE: 98.4 F | HEART RATE: 87 BPM | RESPIRATION RATE: 16 BRPM | OXYGEN SATURATION: 97 % | SYSTOLIC BLOOD PRESSURE: 134 MMHG | HEIGHT: 68 IN | BODY MASS INDEX: 27.28 KG/M2 | DIASTOLIC BLOOD PRESSURE: 80 MMHG

## 2017-08-17 DIAGNOSIS — R79.89 ELEVATED TSH: ICD-10-CM

## 2017-08-17 DIAGNOSIS — E78.00 PURE HYPERCHOLESTEROLEMIA: ICD-10-CM

## 2017-08-17 DIAGNOSIS — E78.1 HYPERTRIGLYCERIDEMIA: ICD-10-CM

## 2017-08-17 DIAGNOSIS — E55.9 VITAMIN D DEFICIENCY DISEASE: ICD-10-CM

## 2017-08-17 DIAGNOSIS — N18.30 CHRONIC KIDNEY DISEASE (CKD), STAGE III (MODERATE) (HCC): ICD-10-CM

## 2017-08-17 DIAGNOSIS — H90.3 SENSORINEURAL HEARING LOSS (SNHL) OF BOTH EARS: ICD-10-CM

## 2017-08-17 PROBLEM — H91.93 BILATERAL HEARING LOSS: Status: ACTIVE | Noted: 2017-08-17

## 2017-08-17 PROCEDURE — 99214 OFFICE O/P EST MOD 30 MIN: CPT | Performed by: FAMILY MEDICINE

## 2017-08-17 NOTE — ASSESSMENT & PLAN NOTE
Lab show low vitamin D level at 20  He used a daily vit D supplement which he will restart.   Recheck labs in 6 months.

## 2017-08-17 NOTE — PROGRESS NOTES
Subjective:   Rai Leija is a 72 y.o. male here today for evaluation and management of:     Hypertriglyceridemia  Significantly improved.   Continue statin, fenofibrate and fish oil  Recheck labs in 6 months.     Hyperlipidemia  Improving on simvastatin 40mg. He has no muscle pain or weakness.   Advised on continuing healthy diet and regular exercise.   Recheck labs in 6 months.       Chronic kidney disease (CKD), stage III (moderate)     Ref. Range 6/24/2016 06:25 10/4/2016 07:43 4/10/2017 06:10 8/14/2017 06:29 8/14/2017 06:30   Creatinine Latest Ref Range: 0.50-1.40 mg/dL 1.59 (H) 1.52 (H) 1.42 (H)  1.58 (H)      Ref. Range 6/24/2016 06:25 10/4/2016 07:43 4/10/2017 06:10 8/14/2017 06:29 8/14/2017 06:30   GFR If Non  Latest Ref Range: >60 mL/min/1.73 m 2 43 (A) 45 (A) 49 (A)  43 (A)      Ref. Range 6/24/2016 06:25 10/4/2016 07:43 4/10/2017 06:10 8/14/2017 06:29 8/14/2017 06:30   Potassium Latest Ref Range: 3.6-5.5 mmol/L 3.7 3.8 4.0  4.2     Avoid metformin, NSAIDS. Advised on low salt and low potassium diet and increase hydration.     Bilateral hearing loss  He has chronic hearing loss in both ears.   Hearing loss in left ear has been worsening. He uses hearing aids.   He was evaluated by ENT and audiology and is going to have new hearing aids.   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     Vitamin D deficiency disease  Lab show low vitamin D level at 20  He used a daily vit D supplement which he will restart.   Recheck labs in 6 months.     Uncontrolled type 2 diabetes mellitus with stage 3 chronic kidney disease, without long-term current use of insulin (CMS-[facility])  A1c improved,   Follows with optometry yearly  Has no trouble with his feet: monofilament exam done today  PNA immunizations complete  Continues on januvia and glimepiride. Has no symptoms of hypoglycemia.   He does some sweet drinks: OJ, cranberry juice, hot chocolate  Advised to moderate intake of sweetened beverages  "and junk foods.   Urine microalbumin normal  Lipids improving on simvastatin and fenofibrate.       Elevated TSH  TSH just above normal range  T4 in normal range  He does not take a thyroid supplement, has no symptoms of hypothyroidism weight is stable and he has good energy levels.   Will recheck labs in 6 months.        Monofilament testing: normal sensation, normal dorsal pedal pulses, few calluses. No ulcers or skin break down.     Current medicines (including changes today)  Current Outpatient Prescriptions   Medication Sig Dispense Refill   • sitagliptin (JANUVIA) 100 MG Tab Take 1 Tab by mouth every day. 90 Tab 3   • aspirin 81 MG tablet Take 1 Tab by mouth every day. 100 Tab 3   • simvastatin (ZOCOR) 40 MG Tab TAKE 1 TABLET BY MOUTH EVERY EVENING 90 Tab 3   • fenofibrate micronized (LOFIBRA) 134 MG capsule TAKE 1 CAPSULE BY MOUTH EVERY MORNING BEFORE BREAKFAST 90 Cap 3   • glimepiride (AMARYL) 2 MG Tab Take 1 Tab by mouth every morning. 90 Tab 3   • hydrochlorothiazide (HYDRODIURIL) 12.5 MG tablet Take 1 Tab by mouth every day. 90 Tab 3   • metformin ER (GLUCOPHAGE XR) 500 MG TABLET SR 24 HR Take 2 Tabs by mouth 2 times a day. 360 Tab 3   • vitamin D (CHOLECALCIFEROL) 1000 UNIT Tab Take 1,000 Units by mouth every day.     • Omega-3 Fatty Acids (FISH OIL) 1000 MG Cap capsule Take 1,000 mg by mouth 3 times a day, with meals.       No current facility-administered medications for this visit.     He  has a past medical history of Diabetes mellitus type II; Elevated BP (9/9/2010); Hyperlipidemia (12/1/2010); Hypertriglyceridemia (12/1/2010); Transaminitis (4/12/2011); Renal stone; and Hearing decreased.    ROS  No chest pain, no shortness of breath, no abdominal pain       Objective:     Blood pressure 134/80, pulse 87, temperature 36.9 °C (98.4 °F), resp. rate 16, height 1.727 m (5' 7.99\"), weight 81.647 kg (180 lb), SpO2 97 %. Body mass index is 27.38 kg/(m^2).   Physical Exam:  Constitutional: Alert, no " distress.  Skin: Warm, dry, good turgor, no rashes in visible areas.  Eye: Equal, round and reactive, conjunctiva clear, lids normal.  ENMT: Lips without lesions, good dentition, oropharynx clear.  Neck: Trachea midline, no masses, no thyromegaly. No cervical or supraclavicular lymphadenopathy  Respiratory: Unlabored respiratory effort, lungs clear to auscultation, no wheezes, no ronchi.  Cardiovascular: Normal S1, S2, no murmur, no edema.  Abdomen: Soft, non-tender, no masses, no hepatosplenomegaly.  Psych: Alert and oriented x3, normal affect and mood.        Assessment and Plan:   The following treatment plan was discussed    1. Hypertriglyceridemia  Improving advised continue medications, diet changes.  - LIPID PROFILE; Future    2. Pure hypercholesterolemia  Improving advised continue medications, diet changes.  - LIPID PROFILE; Future    3. Chronic kidney disease (CKD), stage III (moderate)  Stable. Advised avoid NSAIDS, reduce potassium rich foods, continue good hydration, use low salt diet.   Continue to monitor with labs.     4. Sensorineural hearing loss (SNHL) of both ears  Follow up with audiology for new hearing aids.     5. Vitamin D deficiency disease  Advised restart vitamin d supplementation. He did not want the higher weekly booster dose so advised use about 4000 a day for 4 weeks then 2000 daily.   - VITAMIN D,25 HYDROXY; Future    6. Uncontrolled type 2 diabetes mellitus with stage 3 chronic kidney disease, without long-term current use of insulin (CMS-[facility])  Improving. Advised increase dose of januvia, continue with diet changes.   - sitagliptin (JANUVIA) 100 MG Tab; Take 1 Tab by mouth every day.  Dispense: 90 Tab; Refill: 3  - HEMOGLOBIN A1C; Future  - COMP METABOLIC PANEL; Future  - Diabetic Monofilament Lower Extremity Exam    7. Elevated TSH  Continue to monitor as T4 normal and pt asymptomatic.   - TSH WITH REFLEX TO FT4; Future      Followup: Return in about 6 months (around 2/17/2018) for  Feb, DM2, TSH, CKD.

## 2017-08-17 NOTE — ASSESSMENT & PLAN NOTE
Ref. Range 6/24/2016 06:25 10/4/2016 07:43 4/10/2017 06:10 8/14/2017 06:29 8/14/2017 06:30   Creatinine Latest Ref Range: 0.50-1.40 mg/dL 1.59 (H) 1.52 (H) 1.42 (H)  1.58 (H)      Ref. Range 6/24/2016 06:25 10/4/2016 07:43 4/10/2017 06:10 8/14/2017 06:29 8/14/2017 06:30   GFR If Non  Latest Ref Range: >60 mL/min/1.73 m 2 43 (A) 45 (A) 49 (A)  43 (A)      Ref. Range 6/24/2016 06:25 10/4/2016 07:43 4/10/2017 06:10 8/14/2017 06:29 8/14/2017 06:30   Potassium Latest Ref Range: 3.6-5.5 mmol/L 3.7 3.8 4.0  4.2     Avoid metformin, NSAIDS. Advised on low salt and low potassium diet and increase hydration.

## 2017-08-17 NOTE — PATIENT INSTRUCTIONS
Avoid high potassium foods like banana, oranges, potatoes.   Avoid sweet beverages and junk food.   Recheck labs a week before next visit in February.

## 2017-08-17 NOTE — ASSESSMENT & PLAN NOTE
He has chronic hearing loss in both ears.   Hearing loss in left ear has been worsening. He uses hearing aids.   He was evaluated by ENT and audiology and is going to have new hearing aids.   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

## 2017-08-17 NOTE — ASSESSMENT & PLAN NOTE
Improving on simvastatin 40mg. He has no muscle pain or weakness.   Advised on continuing healthy diet and regular exercise.   Recheck labs in 6 months.

## 2017-08-17 NOTE — MR AVS SNAPSHOT
"        Rai Leija   2017 8:00 AM   Office Visit   MRN: [id number]    Department:  [facility]   Dept Phone:  [phone]    Description:  Male : 1945   Provider:  Sid Chiang M.D.           Reason for Visit     Results labs      Allergies as of 2017     Allergen Noted Reactions    Ace Inhibitors 2011       Cough        You were diagnosed with     Hypertriglyceridemia   [515451]       Pure hypercholesterolemia   [272.0.ICD-9-CM]       Chronic kidney disease (CKD), stage III (moderate)   [226196]       Sensorineural hearing loss (SNHL) of both ears   [8934330]       Vitamin D deficiency disease   [566220]       Uncontrolled type 2 diabetes mellitus with stage 3 chronic kidney disease, without long-term current use of insulin (CMS-[facility])   [7911351]       Elevated TSH   [698713]         Vital Signs     Blood Pressure Pulse Temperature Respirations Height Weight    134/80 mmHg 87 36.9 °C (98.4 °F) 16 1.727 m (5' 7.99\") 81.647 kg (180 lb)    Body Mass Index Oxygen Saturation Smoking Status             27.38 kg/m2 97% Former Smoker         Basic Information     Date Of Birth Sex Race Ethnicity Preferred Language    1945 Male Unknown Unknown English      Your appointments     Feb 15, 2018  6:15 AM   Adult Draw/Collection with [facility] (--)    [address] [place] [address] [place] [place] [address]   [phone]            2018  8:00 AM   Established Patient with Sid Chiang M.D.   [facility] ([place])    [address] [place] [address]  [place] [id number] [phone]           You will be receiving a confirmation call a few days before your appointment from our automated call confirmation system.              Problem List              ICD-10-CM Priority Class Noted - Resolved    Hyperlipidemia E78.5   2010 - Present    Hypertriglyceridemia E78.1   2010 - Present    Uncontrolled type 2 diabetes mellitus with stage 3 chronic kidney " disease, without long-term current use of insulin (CMS-HCC) E11.22, E11.65, N18.3   12/1/2010 - Present    Fatty liver K76.0   5/25/2011 - Present    Vitamin D deficiency disease E55.9   8/28/2014 - Present    Recurrent nephrolithiasis (Chronic) N20.0   5/13/2015 - Present    Chronic kidney disease (CKD), stage III (moderate) N18.3   10/6/2016 - Present    Elevated TSH R94.6   10/6/2016 - Present    Diarrhea R19.7   4/13/2017 - Present    Bilateral hearing loss H91.93   8/17/2017 - Present      Health Maintenance        Date Due Completion Dates    DIABETES MONOFILAMENT / LE EXAM 5/13/2016 5/13/2015 (Done), 4/1/2014 (Done), 3/21/2013 (N/S)    Override on 5/13/2015: Done    Override on 4/1/2014: Done (4/4)    Override on 3/21/2013: (N/S)    IMM INFLUENZA (1) 1/10/2018 (Originally 9/1/2017) ---    IMM DTaP/Tdap/Td Vaccine (1 - Tdap) 1/10/2018 (Originally 1/16/1962) ---    IMM ZOSTER VACCINE 1/10/2018 (Originally 1/16/2003) ---    IMM PNEUMOCOCCAL 65+ (ADULT) LOW/MEDIUM RISK SERIES (2 of 2 - PPSV23) 10/6/2017 10/6/2016    RETINAL SCREENING 11/17/2017 11/17/2016, 10/20/2015 (Done), 3/1/2014 (Done), 1/21/2013 (N/S)    Override on 10/20/2015: Done    Override on 3/1/2014: Done    Override on 1/21/2013: (N/S)    A1C SCREENING 2/14/2018 8/14/2017, 4/10/2017, 10/4/2016, 6/24/2016, 10/23/2015, 5/7/2015, 2/7/2015, 8/19/2014, 3/24/2014, 9/18/2013, 3/15/2013, 8/27/2012, 5/17/2012, 2/22/2012, 11/21/2011, 8/17/2011, 2/2/2011, 11/22/2010    FASTING LIPID PROFILE 8/14/2018 8/14/2017, 4/10/2017, 6/24/2016, 5/7/2015, 8/19/2014, 3/24/2014, 9/18/2013, 3/15/2013, 5/17/2012, 2/22/2012, 11/21/2011, 8/17/2011, 5/9/2011, 2/2/2011, 11/4/2010    URINE ACR / MICROALBUMIN 8/14/2018 8/14/2017, 6/24/2016, 11/17/2015, 8/19/2014, 6/9/2014, 9/18/2013, 3/15/2013, 8/17/2011, 5/9/2011, 2/2/2011    SERUM CREATININE 8/14/2018 8/14/2017, 4/10/2017, 10/4/2016, 6/24/2016, 10/23/2015, 5/7/2015, 2/7/2015, 8/19/2014, 5/7/2014, 3/24/2014, 9/18/2013, 3/15/2013,  8/27/2012, 5/17/2012, 2/22/2012, 11/21/2011, 8/17/2011, 5/9/2011, 2/2/2011, 11/4/2010    COLONOSCOPY 3/21/2023 3/21/2013 (N/S)    Override on 3/21/2013: (N/S) (FIT test to be sent)            Current Immunizations     13-VALENT PCV PREVNAR 10/6/2016      Below and/or attached are the medications your provider expects you to take. Review all of your home medications and newly ordered medications with your provider and/or pharmacist. Follow medication instructions as directed by your provider and/or pharmacist. Please keep your medication list with you and share with your provider. Update the information when medications are discontinued, doses are changed, or new medications (including over-the-counter products) are added; and carry medication information at all times in the event of emergency situations     Allergies:  ACE INHIBITORS - (reactions not documented)               Medications  Valid as of: August 17, 2017 -  8:49 AM    Generic Name Brand Name Tablet Size Instructions for use    Aspirin (Tab) aspirin 81 MG Take 1 Tab by mouth every day.        Cholecalciferol (Tab) cholecalciferol 1000 UNIT Take 1,000 Units by mouth every day.        Fenofibrate Micronized (Cap) LOFIBRA 134 MG TAKE 1 CAPSULE BY MOUTH EVERY MORNING BEFORE BREAKFAST        Glimepiride (Tab) AMARYL 2 MG Take 1 Tab by mouth every morning.        HydroCHLOROthiazide (Tab) HYDRODIURIL 12.5 MG Take 1 Tab by mouth every day.        MetFORMIN HCl (TABLET SR 24 HR) GLUCOPHAGE  MG Take 2 Tabs by mouth 2 times a day.        Omega-3 Fatty Acids (Cap) fish oil 1000 MG Take 1,000 mg by mouth 3 times a day, with meals.        Simvastatin (Tab) ZOCOR 40 MG TAKE 1 TABLET BY MOUTH EVERY EVENING        SITagliptin Phosphate (Tab) JANUVIA 100 MG Take 1 Tab by mouth every day.        .                 Medicines prescribed today were sent to:     [facility] [address] [place] [address], [place] [address]  [facility] [address] [facility] [address]    [address]   [address] [place] [address]    Phone: [phone] Fax: [phone]    Open 24 Hours?: No      Medication refill instructions:       If your prescription bottle indicates you have medication refills left, it is not necessary to call your provider’s office. Please contact your pharmacy and they will refill your medication.    If your prescription bottle indicates you do not have any refills left, you may request refills at any time through one of the following ways: The online [employer] system (except Urgent Care), by calling your provider’s office, or by asking your pharmacy to contact your provider’s office with a refill request. Medication refills are processed only during regular business hours and may not be available until the next business day. Your provider may request additional information or to have a follow-up visit with you prior to refilling your medication.   *Please Note: Medication refills are assigned a new Rx number when refilled electronically. Your pharmacy may indicate that no refills were authorized even though a new prescription for the same medication is available at the pharmacy. Please request the medicine by name with the pharmacy before contacting your provider for a refill.        Your To Do List     Future Labs/Procedures Complete By Expires    COMP METABOLIC PANEL  As directed 8/18/2018    HEMOGLOBIN A1C  As directed 8/18/2018    LIPID PROFILE  As directed 8/18/2018    TSH WITH REFLEX TO FT4  As directed 8/17/2018    VITAMIN D,25 HYDROXY  As directed 8/18/2018      Instructions    Avoid high potassium foods like banana, oranges, potatoes.   Avoid sweet beverages and junk food.   Recheck labs a week before next visit in February.             [employer] Access Code: Activation code not generated  Current [employer] Status: Active

## 2017-08-17 NOTE — ASSESSMENT & PLAN NOTE
TSH just above normal range  T4 in normal range  He does not take a thyroid supplement, has no symptoms of hypothyroidism weight is stable and he has good energy levels.   Will recheck labs in 6 months.

## 2018-02-15 ENCOUNTER — HOSPITAL ENCOUNTER (OUTPATIENT)
Dept: LAB | Facility: MEDICAL CENTER | Age: 73
End: 2018-02-15
Attending: FAMILY MEDICINE
Payer: MEDICARE

## 2018-02-15 DIAGNOSIS — E78.00 PURE HYPERCHOLESTEROLEMIA: ICD-10-CM

## 2018-02-15 DIAGNOSIS — E55.9 VITAMIN D DEFICIENCY DISEASE: ICD-10-CM

## 2018-02-15 DIAGNOSIS — R79.89 ELEVATED TSH: ICD-10-CM

## 2018-02-15 DIAGNOSIS — E78.1 HYPERTRIGLYCERIDEMIA: ICD-10-CM

## 2018-02-15 LAB
25(OH)D3 SERPL-MCNC: 17 NG/ML (ref 30–100)
ALBUMIN SERPL BCP-MCNC: 4 G/DL (ref 3.2–4.9)
ALBUMIN/GLOB SERPL: 1.3 G/DL
ALP SERPL-CCNC: 68 U/L (ref 30–99)
ALT SERPL-CCNC: 16 U/L (ref 2–50)
ANION GAP SERPL CALC-SCNC: 6 MMOL/L (ref 0–11.9)
AST SERPL-CCNC: 18 U/L (ref 12–45)
BILIRUB SERPL-MCNC: 0.5 MG/DL (ref 0.1–1.5)
BUN SERPL-MCNC: 23 MG/DL (ref 8–22)
CALCIUM SERPL-MCNC: 9.5 MG/DL (ref 8.5–10.5)
CHLORIDE SERPL-SCNC: 107 MMOL/L (ref 96–112)
CHOLEST SERPL-MCNC: 106 MG/DL (ref 100–199)
CO2 SERPL-SCNC: 24 MMOL/L (ref 20–33)
CREAT SERPL-MCNC: 1.61 MG/DL (ref 0.5–1.4)
EST. AVERAGE GLUCOSE BLD GHB EST-MCNC: 200 MG/DL
GLOBULIN SER CALC-MCNC: 3.1 G/DL (ref 1.9–3.5)
GLUCOSE SERPL-MCNC: 224 MG/DL (ref 65–99)
HBA1C MFR BLD: 8.6 % (ref 0–5.6)
HDLC SERPL-MCNC: 28 MG/DL
LDLC SERPL CALC-MCNC: 32 MG/DL
POTASSIUM SERPL-SCNC: 4.1 MMOL/L (ref 3.6–5.5)
PROT SERPL-MCNC: 7.1 G/DL (ref 6–8.2)
SODIUM SERPL-SCNC: 137 MMOL/L (ref 135–145)
T4 FREE SERPL-MCNC: 0.74 NG/DL (ref 0.53–1.43)
TRIGL SERPL-MCNC: 232 MG/DL (ref 0–149)
TSH SERPL DL<=0.005 MIU/L-ACNC: 5.8 UIU/ML (ref 0.38–5.33)

## 2018-02-15 PROCEDURE — 80061 LIPID PANEL: CPT

## 2018-02-15 PROCEDURE — 84443 ASSAY THYROID STIM HORMONE: CPT

## 2018-02-15 PROCEDURE — 80053 COMPREHEN METABOLIC PANEL: CPT

## 2018-02-15 PROCEDURE — 82306 VITAMIN D 25 HYDROXY: CPT

## 2018-02-15 PROCEDURE — 36415 COLL VENOUS BLD VENIPUNCTURE: CPT

## 2018-02-15 PROCEDURE — 84439 ASSAY OF FREE THYROXINE: CPT

## 2018-02-15 PROCEDURE — 83036 HEMOGLOBIN GLYCOSYLATED A1C: CPT | Mod: GA

## 2018-02-21 NOTE — PROGRESS NOTES
Rai,  Your labs show improving triglycerides, the blood sugars, A1c have increased a little and your kidney flow rate is low. We can discuss these further at your visit this month with me. Continue with healthy diet changes, regular exercise and plenty of water for the kidneys.   Sid Chiang M.D.

## 2018-02-22 ENCOUNTER — OFFICE VISIT (OUTPATIENT)
Dept: MEDICAL GROUP | Facility: PHYSICIAN GROUP | Age: 73
End: 2018-02-22
Payer: MEDICARE

## 2018-02-22 VITALS
RESPIRATION RATE: 16 BRPM | TEMPERATURE: 98.2 F | OXYGEN SATURATION: 97 % | DIASTOLIC BLOOD PRESSURE: 78 MMHG | BODY MASS INDEX: 27.89 KG/M2 | HEART RATE: 78 BPM | WEIGHT: 184 LBS | SYSTOLIC BLOOD PRESSURE: 126 MMHG | HEIGHT: 68 IN

## 2018-02-22 DIAGNOSIS — E78.01 FAMILIAL HYPERCHOLESTEROLEMIA: ICD-10-CM

## 2018-02-22 DIAGNOSIS — E78.00 PURE HYPERCHOLESTEROLEMIA: ICD-10-CM

## 2018-02-22 DIAGNOSIS — N20.0 RECURRENT NEPHROLITHIASIS: Chronic | ICD-10-CM

## 2018-02-22 DIAGNOSIS — M79.642 BILATERAL HAND PAIN: ICD-10-CM

## 2018-02-22 DIAGNOSIS — E78.1 HYPERTRIGLYCERIDEMIA: ICD-10-CM

## 2018-02-22 DIAGNOSIS — M79.641 BILATERAL HAND PAIN: ICD-10-CM

## 2018-02-22 DIAGNOSIS — E03.8 SUBCLINICAL HYPOTHYROIDISM: ICD-10-CM

## 2018-02-22 DIAGNOSIS — E55.9 VITAMIN D DEFICIENCY DISEASE: ICD-10-CM

## 2018-02-22 DIAGNOSIS — N18.30 CHRONIC KIDNEY DISEASE (CKD), STAGE III (MODERATE) (HCC): ICD-10-CM

## 2018-02-22 PROCEDURE — 99214 OFFICE O/P EST MOD 30 MIN: CPT | Performed by: FAMILY MEDICINE

## 2018-02-22 RX ORDER — FENOFIBRATE 134 MG/1
CAPSULE ORAL
Qty: 90 CAP | Refills: 3 | Status: SHIPPED | OUTPATIENT
Start: 2018-02-22 | End: 2018-11-29

## 2018-02-22 RX ORDER — GLIMEPIRIDE 2 MG/1
2 TABLET ORAL EVERY MORNING
Qty: 90 TAB | Refills: 3 | Status: SHIPPED | OUTPATIENT
Start: 2018-02-22 | End: 2018-05-17 | Stop reason: SDUPTHER

## 2018-02-22 RX ORDER — SIMVASTATIN 40 MG
TABLET ORAL
Qty: 90 TAB | Refills: 3 | Status: SHIPPED | OUTPATIENT
Start: 2018-02-22 | End: 2019-04-16 | Stop reason: SDUPTHER

## 2018-02-22 RX ORDER — LEVOTHYROXINE SODIUM 0.03 MG/1
25 TABLET ORAL
Qty: 90 TAB | Refills: 1 | Status: SHIPPED | OUTPATIENT
Start: 2018-02-22 | End: 2018-05-17

## 2018-02-22 NOTE — ASSESSMENT & PLAN NOTE
A1c has increased to 8.6  He is on januvia, glimepiride 2 mg daily, metformin was stopped due to worsening renal function. Cant use SGLT2 due to low GFR of 42. He has not been taking glimepiride as was told to take with bfast and he forgets. Advised him to take it with other pills in am.   Advised on diet changes: he does have hot chocolate and cookies and processed foods.   GFR decreased to 42.   LDL is 32 he is on simvastatin 40 mg.   He is uptodate on retinal screening annually.   He has no chest pain, blood pressure is well controlled. Takes an asa 81 mg.

## 2018-02-22 NOTE — PROGRESS NOTES
Subjective:   Rai Leija is a 73 y.o. male here today for evaluation and management of:     Bilateral hand pain  Swelling and some deformity of joints in fingers: chronic, worsening gradually over time.   Pain at base of thumbs, now activity restricted due to this  He declines injections due to fear of needles.   Will try topical NSAID, advised on ice, brace, tylenol arthritis.     Uncontrolled type 2 diabetes mellitus with stage 3 chronic kidney disease, without long-term current use of insulin (CMS-HCC)  A1c has increased to 8.6  He is on januvia, glimepiride 2 mg daily, metformin was stopped due to worsening renal function. Cant use SGLT2 due to low GFR of 42. He has not been taking glimepiride as was told to take with bfast and he forgets. Advised him to take it with other pills in am.   Advised on diet changes: he does have hot chocolate and cookies and processed foods.   GFR decreased to 42.   LDL is 32 he is on simvastatin 40 mg.   He is uptodate on retinal screening annually.   He has no chest pain, blood pressure is well controlled. Takes an asa 81 mg.       Hyperlipidemia  Chronic controlled on simvastatin 40 mg  No myalgia or myopathy.     Vitamin D deficiency disease  Low vit d on labs 17  Advised to increase his vit d to 5510-2750 units daily.     Chronic kidney disease (CKD), stage III (moderate)  42 GFR   Cr 1.61  Metformin was stopped   avised on hydration avoid NSAIdS    Subclinical hypothyroidism  TSH persistently with mild elevation   T4 always normal  Will start supplement with low dose levothyroxine and recheck TSH in 3 months. Hoping normalizing thyroid levels/labs will help with blood sugar control.          Current medicines (including changes today)  Current Outpatient Prescriptions   Medication Sig Dispense Refill   • levothyroxine (SYNTHROID) 25 MCG Tab Take 1 Tab by mouth Every morning on an empty stomach. 90 Tab 1   • SITagliptin (JANUVIA) 100 MG Tab Take 1 Tab by mouth every day. 90  "Tab 3   • simvastatin (ZOCOR) 40 MG Tab TAKE 1 TABLET BY MOUTH EVERY EVENING 90 Tab 3   • glimepiride (AMARYL) 2 MG Tab Take 1 Tab by mouth every morning. 90 Tab 3   • fenofibrate micronized (LOFIBRA) 134 MG capsule TAKE 1 CAPSULE BY MOUTH EVERY MORNING BEFORE BREAKFAST 90 Cap 3   • Diclofenac Sodium 1 % Cream Apply 1 g to skin as directed 2 Times a Day. 60 g 3   • aspirin 81 MG tablet Take 1 Tab by mouth every day. 100 Tab 3   • vitamin D (CHOLECALCIFEROL) 1000 UNIT Tab Take 1,000 Units by mouth every day.     • Omega-3 Fatty Acids (FISH OIL) 1000 MG Cap capsule Take 1,000 mg by mouth 3 times a day, with meals.       No current facility-administered medications for this visit.      He  has a past medical history of Diabetes mellitus type II; Elevated BP (9/9/2010); Hearing decreased; Hyperlipidemia (12/1/2010); Hypertriglyceridemia (12/1/2010); Renal stone; and Transaminitis (4/12/2011).    ROS  No chest pain, no shortness of breath, no abdominal pain       Objective:     Blood pressure 126/78, pulse 78, temperature 36.8 °C (98.2 °F), resp. rate 16, height 1.727 m (5' 8\"), weight 83.5 kg (184 lb), SpO2 97 %. Body mass index is 27.98 kg/m².   Physical Exam:  Constitutional: Alert, no distress.  Skin: Warm, dry, good turgor, no rashes in visible areas.  Eye: Equal, round and reactive, conjunctiva clear, lids normal.  ENMT: Lips without lesions, good dentition, oropharynx clear.  Neck: Trachea midline, no masses, no thyromegaly. No cervical or supraclavicular lymphadenopathy  Respiratory: Unlabored respiratory effort, lungs clear to auscultation, no wheezes, no ronchi.  Cardiovascular: Normal S1, S2, no murmur, no edema.  Abdomen: Soft, non-tender, no masses, no hepatosplenomegaly.  Psych: Alert and oriented x3, normal affect and mood.        Assessment and Plan:   The following treatment plan was discussed    1. Bilateral hand pain  Topical NSAID  Will refer to ortho if worsening.     2. Uncontrolled type 2 diabetes " mellitus with stage 3 chronic kidney disease, without long-term current use of insulin (CMS-HCC)  Worsening. Advised on diet changes: stop cookies, hot chocolate and processed foods.   - HEMOGLOBIN A1C; Future  - SITagliptin (JANUVIA) 100 MG Tab; Take 1 Tab by mouth every day.  Dispense: 90 Tab; Refill: 3  - glimepiride (AMARYL) 2 MG Tab; Take 1 Tab by mouth every morning.  Dispense: 90 Tab; Refill: 3    3. Pure hypercholesterolemia  Chronic controlled.   - simvastatin (ZOCOR) 40 MG Tab; TAKE 1 TABLET BY MOUTH EVERY EVENING  Dispense: 90 Tab; Refill: 3  4. Vitamin D deficiency disease  Increase daily vit D supplement to 4000units     5. Chronic kidney disease (CKD), stage III (moderate)  Chronic, worsening. Recheck   Refer to nephrology if continues to worsen  Continue ACEI  - RENAL FUNCTION PANEL; Future    6. Subclinical hypothyroidism  Recheck labs.  - TSH WITH REFLEX TO FT4; Future    7. Hypertriglyceridemia  Chronic condition, continue fenofibrate. Advised on diet changes.   - fenofibrate micronized (LOFIBRA) 134 MG capsule; TAKE 1 CAPSULE BY MOUTH EVERY MORNING BEFORE BREAKFAST  Dispense: 90 Cap; Refill: 3      Followup: Return in about 3 months (around 5/22/2018) for DM nurse educator: DM, CKD, hypothyroidism after may 15 lab.

## 2018-02-22 NOTE — ASSESSMENT & PLAN NOTE
Swelling and some deformity of joints in fingers: chronic, worsening gradually over time.   Pain at base of thumbs, now activity restricted due to this  He declines injections due to fear of needles.   Will try topical NSAID, advised on ice, brace, tylenol arthritis.

## 2018-02-22 NOTE — ASSESSMENT & PLAN NOTE
TSH persistently with mild elevation   T4 always normal  Will start supplement with low dose levothyroxine and recheck TSH in 3 months. Hoping normalizing thyroid levels/labs will help with blood sugar control.

## 2018-05-16 ENCOUNTER — HOSPITAL ENCOUNTER (OUTPATIENT)
Dept: LAB | Facility: MEDICAL CENTER | Age: 73
End: 2018-05-16
Attending: FAMILY MEDICINE
Payer: MEDICARE

## 2018-05-16 DIAGNOSIS — N18.30 CHRONIC KIDNEY DISEASE (CKD), STAGE III (MODERATE) (HCC): ICD-10-CM

## 2018-05-16 DIAGNOSIS — E03.8 SUBCLINICAL HYPOTHYROIDISM: ICD-10-CM

## 2018-05-16 LAB
ALBUMIN SERPL BCP-MCNC: 4.2 G/DL (ref 3.2–4.9)
BUN SERPL-MCNC: 21 MG/DL (ref 8–22)
CALCIUM SERPL-MCNC: 9.3 MG/DL (ref 8.5–10.5)
CHLORIDE SERPL-SCNC: 109 MMOL/L (ref 96–112)
CO2 SERPL-SCNC: 25 MMOL/L (ref 20–33)
CREAT SERPL-MCNC: 1.38 MG/DL (ref 0.5–1.4)
EST. AVERAGE GLUCOSE BLD GHB EST-MCNC: 166 MG/DL
GLUCOSE SERPL-MCNC: 102 MG/DL (ref 65–99)
HBA1C MFR BLD: 7.4 % (ref 0–5.6)
PHOSPHATE SERPL-MCNC: 3.3 MG/DL (ref 2.5–4.5)
POTASSIUM SERPL-SCNC: 4.2 MMOL/L (ref 3.6–5.5)
SODIUM SERPL-SCNC: 140 MMOL/L (ref 135–145)
T4 FREE SERPL-MCNC: 0.77 NG/DL (ref 0.53–1.43)
TSH SERPL DL<=0.005 MIU/L-ACNC: 5.85 UIU/ML (ref 0.38–5.33)

## 2018-05-16 PROCEDURE — 80069 RENAL FUNCTION PANEL: CPT

## 2018-05-16 PROCEDURE — 36415 COLL VENOUS BLD VENIPUNCTURE: CPT

## 2018-05-16 PROCEDURE — 83036 HEMOGLOBIN GLYCOSYLATED A1C: CPT | Mod: GA

## 2018-05-16 PROCEDURE — 84443 ASSAY THYROID STIM HORMONE: CPT

## 2018-05-16 PROCEDURE — 84439 ASSAY OF FREE THYROXINE: CPT

## 2018-05-17 ENCOUNTER — OFFICE VISIT (OUTPATIENT)
Dept: MEDICAL GROUP | Facility: PHYSICIAN GROUP | Age: 73
End: 2018-05-17
Payer: MEDICARE

## 2018-05-17 VITALS
SYSTOLIC BLOOD PRESSURE: 126 MMHG | HEIGHT: 68 IN | WEIGHT: 185 LBS | DIASTOLIC BLOOD PRESSURE: 60 MMHG | HEART RATE: 83 BPM | OXYGEN SATURATION: 95 % | BODY MASS INDEX: 28.04 KG/M2 | TEMPERATURE: 97.6 F

## 2018-05-17 DIAGNOSIS — E78.00 PURE HYPERCHOLESTEROLEMIA: ICD-10-CM

## 2018-05-17 DIAGNOSIS — E03.8 SUBCLINICAL HYPOTHYROIDISM: ICD-10-CM

## 2018-05-17 DIAGNOSIS — N18.30 CHRONIC KIDNEY DISEASE (CKD), STAGE III (MODERATE) (HCC): ICD-10-CM

## 2018-05-17 PROBLEM — R19.7 DIARRHEA: Status: RESOLVED | Noted: 2017-04-13 | Resolved: 2018-05-17

## 2018-05-17 PROCEDURE — 99214 OFFICE O/P EST MOD 30 MIN: CPT | Performed by: FAMILY MEDICINE

## 2018-05-17 RX ORDER — LEVOTHYROXINE SODIUM 0.05 MG/1
50 TABLET ORAL
Qty: 90 TAB | Refills: 1 | Status: SHIPPED | OUTPATIENT
Start: 2018-05-17 | End: 2018-11-17 | Stop reason: SDUPTHER

## 2018-05-17 RX ORDER — GLIMEPIRIDE 2 MG/1
2 TABLET ORAL 2 TIMES DAILY
Qty: 180 TAB | Refills: 3 | Status: SHIPPED | OUTPATIENT
Start: 2018-05-17 | End: 2019-06-19 | Stop reason: SDUPTHER

## 2018-05-17 NOTE — ASSESSMENT & PLAN NOTE
Improving A1c improved to 7.4  Increase glimepiride to 2 mg bid  Could not tolerate ACI  Takes asa, statin  Gets eye exam every year.

## 2018-05-17 NOTE — PROGRESS NOTES
RN-SOLITARIOE Note    Subjective:     Health changes since last visit/interval Hx: General health good.  He states he feels good some days and not so good others.    Medications (including changes made today)  Current Outpatient Prescriptions   Medication Sig Dispense Refill   • SITagliptin (JANUVIA) 100 MG Tab Take 1 Tab by mouth every day. 90 Tab 3   • simvastatin (ZOCOR) 40 MG Tab TAKE 1 TABLET BY MOUTH EVERY EVENING 90 Tab 3   • glimepiride (AMARYL) 2 MG Tab Take 1 Tab by mouth every morning. 90 Tab 3   • fenofibrate micronized (LOFIBRA) 134 MG capsule TAKE 1 CAPSULE BY MOUTH EVERY MORNING BEFORE BREAKFAST 90 Cap 3   • Diclofenac Sodium 1 % Cream Apply 1 g to skin as directed 2 Times a Day. 60 g 3   • aspirin 81 MG tablet Take 1 Tab by mouth every day. 100 Tab 3   • vitamin D (CHOLECALCIFEROL) 1000 UNIT Tab Take 1,000 Units by mouth every day.     • levothyroxine (SYNTHROID) 25 MCG Tab Take 1 Tab by mouth Every morning on an empty stomach. 90 Tab 1     No current facility-administered medications for this visit.          Taking daily ASA: Yes  Taking above medications as prescribed: Yes   Patient Denies side effects of medication.    Exercise: Walking and yard work.  Diet: Breakfast is Oatmeal, [place], and toast.  Lunch he either skips or crackers and cheese/hot dog/ sandwich.  Dinner is meat, salad, and starch.  Flavored water.    Health Maintenance:   Health Maintenance Topics with due status: Overdue       Topic Date Due    IMM DTaP/Tdap/Td Vaccine 01/16/1962    IMM PNEUMOCOCCAL 65+ (ADULT) LOW/MEDIUM RISK SERIES 10/06/2017    RETINAL SCREENING 11/17/2017    Annual Wellness Visit 01/31/2018         DM:   Last A1c:   Lab Results   Component Value Date/Time    HBA1C 7.4 (H) 05/16/2018 07:27 AM      A1c goal: < 7    Glucose monitoring frequency: not testing blood sugars    Hypoglycemic episodes: no     Last Retinal Exam: November 2017 Provider: Jamie  Daily Foot Exam: yes  Routine Dental Exams: yes    Lab Results    Component Value Date/Time    MALBCRT 14 08/14/2017 06:29 AM    MICROALBUR 1.3 08/14/2017 06:29 AM        ACR Albumin/Creatinine Ratio goal <30     Diabetic complications: peripheral neuropathy    HTN:   Blood pressure goal <140/<80 .   Currently Rx ACE/ARB: No    Dyslipidemia:    Lab Results   Component Value Date/Time    CHOLSTRLTOT 106 02/15/2018 06:10 AM    LDL 32 02/15/2018 06:10 AM    HDL 28 (A) 02/15/2018 06:10 AM    TRIGLYCERIDE 232 (H) 02/15/2018 06:10 AM       Lab Results   Component Value Date/Time    SODIUM 140 05/16/2018 07:27 AM    POTASSIUM 4.2 05/16/2018 07:27 AM    CHLORIDE 109 05/16/2018 07:27 AM    CO2 25 05/16/2018 07:27 AM    GLUCOSE 102 (H) 05/16/2018 07:27 AM    BUN 21 05/16/2018 07:27 AM    CREATININE 1.38 05/16/2018 07:27 AM     Lab Results   Component Value Date/Time    ALKPHOSPHAT 68 02/15/2018 06:10 AM    ASTSGOT 18 02/15/2018 06:10 AM    ALTSGPT 16 02/15/2018 06:10 AM    TBILIRUBIN 0.5 02/15/2018 06:10 AM        Currently Rx Statin: Yes      He  reports that he quit smoking about 31 years ago. His smoking use included Cigarettes. He has a 10.00 pack-year smoking history. He has never used smokeless tobacco.    Objective:     Exam:  Monofilament: done  Monofilament testing with a 10 gram force: sensation intact: intact bilaterally  Visual Inspection: Feet without maceration, ulcers, fissures.  Pedal pulses: intact bilaterally      Plan:     - Diabetic diet discussed in detail-plate method.  - Home glucose monitoring.  - He will test and log.    - He will walk for 20-30 minutes daily.  - Reviewed medications and advised to take metformin after meals to decrease   G.I.upset.   - Discussed importance of immunizations and yearly eye exams.   - Encouraged patient to attend diabetes education program.   -Educational material distributed.   - Advised daily foot exams. Educated on signs of infection.       Recommended medication changes: He would do well on Farxiga but is not sure he can afford  it.  He would like to increase his Glimepiride to 2 mg BID.  He was supplied a Contour Next meter and will check his blood sugar at different times.  His wife is on Victoza but he does not like needles so is not ready to try a GLP 1 yet.     I examined patient today, agree with above plan by DM nurse educator.     Subclinical hypothyroidism  tsh elevated will increase levothyroxine 50 mcg  Recheck TSH in 3 months.     Chronic kidney disease (CKD), stage III (moderate)  Improved GFR to 50  He could not tolerate ACEI  Encouraged good hydration  Blood sugars better controlled  BP well controlled.     Hyperlipidemia  Results for [address] (MRN [id number]) as of 5/17/2018 14:15   Ref. Range 2/15/2018 06:10   Cholesterol,Tot Latest Ref Range: 100 - 199 mg/dL 106   Triglycerides Latest Ref Range: 0 - 149 mg/dL 232 (H)   HDL Latest Ref Range: >=40 mg/dL 28 (A)   LDL Latest Ref Range: <100 mg/dL 32     Continues on simvastatin 40mg a day with no myalgia.     Uncontrolled type 2 diabetes mellitus with stage 3 chronic kidney disease, without long-term current use of insulin (CMS-[facility])  Improving A1c improved to 7.4  Increase glimepiride to 2 mg bid  Could not tolerate ACI  Takes asa, statin  Gets eye exam every year.     Sid Chiang M.D.

## 2018-05-17 NOTE — ASSESSMENT & PLAN NOTE
Improved GFR to 50  He could not tolerate ACEI  Encouraged good hydration  Blood sugars better controlled  BP well controlled.

## 2018-05-17 NOTE — ASSESSMENT & PLAN NOTE
Results for [address] (MRN [id number]) as of 5/17/2018 14:15   Ref. Range 2/15/2018 06:10   Cholesterol,Tot Latest Ref Range: 100 - 199 mg/dL 106   Triglycerides Latest Ref Range: 0 - 149 mg/dL 232 (H)   HDL Latest Ref Range: >=40 mg/dL 28 (A)   LDL Latest Ref Range: <100 mg/dL 32     Continues on simvastatin 40mg a day with no myalgia.

## 2018-08-16 ENCOUNTER — HOSPITAL ENCOUNTER (OUTPATIENT)
Dept: LAB | Facility: MEDICAL CENTER | Age: 73
End: 2018-08-16
Attending: FAMILY MEDICINE
Payer: MEDICARE

## 2018-08-16 DIAGNOSIS — E03.8 SUBCLINICAL HYPOTHYROIDISM: ICD-10-CM

## 2018-08-16 DIAGNOSIS — N18.30 CHRONIC KIDNEY DISEASE (CKD), STAGE III (MODERATE) (HCC): ICD-10-CM

## 2018-08-16 LAB
ALBUMIN SERPL BCP-MCNC: 4.1 G/DL (ref 3.2–4.9)
BUN SERPL-MCNC: 29 MG/DL (ref 8–22)
CALCIUM SERPL-MCNC: 9.5 MG/DL (ref 8.5–10.5)
CHLORIDE SERPL-SCNC: 108 MMOL/L (ref 96–112)
CO2 SERPL-SCNC: 23 MMOL/L (ref 20–33)
CREAT SERPL-MCNC: 1.63 MG/DL (ref 0.5–1.4)
EST. AVERAGE GLUCOSE BLD GHB EST-MCNC: 166 MG/DL
GLUCOSE SERPL-MCNC: 145 MG/DL (ref 65–99)
HBA1C MFR BLD: 7.4 % (ref 0–5.6)
PHOSPHATE SERPL-MCNC: 3.4 MG/DL (ref 2.5–4.5)
POTASSIUM SERPL-SCNC: 4.2 MMOL/L (ref 3.6–5.5)
SODIUM SERPL-SCNC: 140 MMOL/L (ref 135–145)
TSH SERPL DL<=0.005 MIU/L-ACNC: 3.49 UIU/ML (ref 0.38–5.33)

## 2018-08-16 PROCEDURE — 84443 ASSAY THYROID STIM HORMONE: CPT

## 2018-08-16 PROCEDURE — 83036 HEMOGLOBIN GLYCOSYLATED A1C: CPT | Mod: GA

## 2018-08-16 PROCEDURE — 36415 COLL VENOUS BLD VENIPUNCTURE: CPT

## 2018-08-16 PROCEDURE — 80069 RENAL FUNCTION PANEL: CPT

## 2018-08-22 ENCOUNTER — OFFICE VISIT (OUTPATIENT)
Dept: MEDICAL GROUP | Facility: PHYSICIAN GROUP | Age: 73
End: 2018-08-22
Payer: MEDICARE

## 2018-08-22 VITALS
HEIGHT: 68 IN | HEART RATE: 74 BPM | RESPIRATION RATE: 16 BRPM | SYSTOLIC BLOOD PRESSURE: 124 MMHG | DIASTOLIC BLOOD PRESSURE: 68 MMHG | WEIGHT: 186 LBS | BODY MASS INDEX: 28.19 KG/M2 | OXYGEN SATURATION: 97 % | TEMPERATURE: 98.3 F

## 2018-08-22 DIAGNOSIS — E03.8 OTHER SPECIFIED HYPOTHYROIDISM: ICD-10-CM

## 2018-08-22 DIAGNOSIS — N18.30 CHRONIC KIDNEY DISEASE (CKD), STAGE III (MODERATE) (HCC): ICD-10-CM

## 2018-08-22 PROCEDURE — 99214 OFFICE O/P EST MOD 30 MIN: CPT | Performed by: FAMILY MEDICINE

## 2018-08-22 ASSESSMENT — PATIENT HEALTH QUESTIONNAIRE - PHQ9: CLINICAL INTERPRETATION OF PHQ2 SCORE: 0

## 2018-08-22 NOTE — ASSESSMENT & PLAN NOTE
Improved control of diabetes A1c 7.5  LDL 38  GFR 4, urine microalbumin is normal last year.   He gets his eyes checked every year.   He takes simvastatin, asa, could not tolerate ACEI

## 2018-08-22 NOTE — PROGRESS NOTES
Subjective:   Rai Leija is a 73 y.o. male here today for evaluation and management of:     Chronic kidney disease (CKD), stage III (moderate)  GFR 41, CR 1.63 slightly decreased from previous.   He could not tolerate ACEI, encouraged low salt diet and hydration.   BP is well controlled. Blood sugars are improved.   Recheck renal function in 3 months. If dropping can add losartan to see if it helps. Will also need referral to nephrology if declining renal function.       Other specified hypothyroidism  TSH is normal on levothyroxine 50mcg  He has no significant changes in weight, hair or skin.     Uncontrolled type 2 diabetes mellitus with stage 3 chronic kidney disease, without long-term current use of insulin (CMS-HCC)  Improved control of diabetes A1c 7.5  LDL 38  GFR 4, urine microalbumin is normal last year.   He gets his eyes checked every year.   He takes simvastatin, asa, could not tolerate ACEI         Current medicines (including changes today)  Current Outpatient Prescriptions   Medication Sig Dispense Refill   • glucose blood (EMPERATRIZ CONTOUR NEXT TEST) strip 1 Strip by Other route every day. 100 Strip 3   • glimepiride (AMARYL) 2 MG Tab Take 1 Tab by mouth 2 times a day. 180 Tab 3   • levothyroxine (SYNTHROID) 50 MCG Tab Take 1 Tab by mouth Every morning on an empty stomach. 90 Tab 1   • SITagliptin (JANUVIA) 100 MG Tab Take 1 Tab by mouth every day. 90 Tab 3   • simvastatin (ZOCOR) 40 MG Tab TAKE 1 TABLET BY MOUTH EVERY EVENING 90 Tab 3   • fenofibrate micronized (LOFIBRA) 134 MG capsule TAKE 1 CAPSULE BY MOUTH EVERY MORNING BEFORE BREAKFAST 90 Cap 3   • Diclofenac Sodium 1 % Cream Apply 1 g to skin as directed 2 Times a Day. 60 g 3   • aspirin 81 MG tablet Take 1 Tab by mouth every day. 100 Tab 3   • vitamin D (CHOLECALCIFEROL) 1000 UNIT Tab Take 1,000 Units by mouth every day.       No current facility-administered medications for this visit.      He  has a past medical history of Diabetes mellitus  "type II; Elevated BP (9/9/2010); Hearing decreased; Hyperlipidemia (12/1/2010); Hypertriglyceridemia (12/1/2010); Renal stone; and Transaminitis (4/12/2011).    ROS  No chest pain, no shortness of breath, no abdominal pain       Objective:     Blood pressure 124/68, pulse 74, temperature 36.8 °C (98.3 °F), resp. rate 16, height 1.727 m (5' 8\"), weight 84.4 kg (186 lb), SpO2 97 %. Body mass index is 28.28 kg/m².   Physical Exam:  Constitutional: Alert, no distress.  Skin: Warm, dry, good turgor, no rashes in visible areas.  Eye: Equal, round and reactive, conjunctiva clear, lids normal.  ENMT: Lips without lesions, good dentition, oropharynx clear.  Neck: Trachea midline, no masses, no thyromegaly. No cervical or supraclavicular lymphadenopathy  Respiratory: Unlabored respiratory effort, lungs clear to auscultation, no wheezes, no ronchi.  Cardiovascular: Normal S1, S2, no murmur, no edema.  Abdomen: Soft, non-tender, no masses, no hepatosplenomegaly.  Psych: Alert and oriented x3, normal affect and mood.        Assessment and Plan:   The following treatment plan was discussed    1. Chronic kidney disease (CKD), stage III (moderate)  Advised on low salt diet, hydration, avoid NSAIDS  - COMP METABOLIC PANEL; Future    2. Other specified hypothyroidism  Continue levothyroxine 50mcg    3. Uncontrolled type 2 diabetes mellitus with stage 3 chronic kidney disease, without long-term current use of insulin (HCC)  Stable.  - HEMOGLOBIN A1C; Future  - MICROALBUMIN CREAT RATIO URINE (LAB COLLECT); Future  - Diabetic Monofilament Lower Extremity Exam      Followup: Return in about 3 months (around 11/22/2018) for DM nurse visit.         "

## 2018-08-22 NOTE — ASSESSMENT & PLAN NOTE
GFR 41, CR 1.63 slightly decreased from previous.   He could not tolerate ACEI, encouraged low salt diet and hydration.   BP is well controlled. Blood sugars are improved.   Recheck renal function in 3 months. If dropping can add losartan to see if it helps. Will also need referral to nephrology if declining renal function.

## 2018-08-27 ENCOUNTER — PATIENT OUTREACH (OUTPATIENT)
Dept: HEALTH INFORMATION MANAGEMENT | Facility: OTHER | Age: 73
End: 2018-08-27

## 2018-08-27 ENCOUNTER — TELEPHONE (OUTPATIENT)
Dept: MEDICAL GROUP | Facility: PHYSICIAN GROUP | Age: 73
End: 2018-08-27

## 2018-08-27 NOTE — PROGRESS NOTES
Outcome: Left Message    Please transfer to Patient Outreach Team at [phone] when patient returns call.    WebIZ Checked & Epic Updated:  yes    [facility] Verified: no    Attempt # 1

## 2018-08-27 NOTE — PROGRESS NOTES
1. Attempt #: Final    2. WebIZ Checked & Epic Updated: Yes  3. [facility]Connect Verified: no  4. Verify PCP: yes    5. Communication Preference Obtained: not able to go over more info/ pt stated that already know about his next appt    6. Diabetes Visit Scheduling  Scheduling Status:Scheduled      7. Care Gap Scheduling (Attempt to Schedule EACH Overdue Care Gap!)    Health Maintenance Due   Topic Date Due   • IMM HEP B VACCINE (1 of 3 - Risk 3-dose series) 01/16/1964   • IMM DTaP/Tdap/Td Vaccine (1 - Tdap) 01/16/1962    • IMM ZOSTER VACCINES (1 of 2) 01/16/1993   • IMM PNEUMOCOCCAL 65+ (ADULT) LOW/MEDIUM RISK SERIES (2 of 2 - PPSV23) 10/06/2017   • Annual Wellness Visit  01/31/2018 / Not able to address.   • URINE ACR / MICROALBUMIN  08/14/2018 / Will complete before of appt.   • IMM INFLUENZA (1) 09/01/2018        8. Patient was directed to Health and Wellness Website: no   9. Screened for [employer] Prescription? no  10. [employer] Activation: already active  11. [employer] Kelsey: no  12. Virtual Visits: no  13. Opt In to Text Messages: no

## 2018-08-27 NOTE — TELEPHONE ENCOUNTER
Please let pt know [facility] K[facility] mychart msg sent to pt.   Sid Chiang M.D.

## 2018-08-27 NOTE — TELEPHONE ENCOUNTER
Good afternoon Dr. Chiang,    Rai called and stated that he when to the pharmacy to  the test strips to check his glucose and that they gave him the wrong size.   Pt is asking if you can contact the pharmacy and update the information, because only have one more strip for tomorrow. Please advise.    Thank you.

## 2018-10-08 RX ORDER — GLIMEPIRIDE 2 MG/1
TABLET ORAL
Qty: 90 TAB | Refills: 0 | OUTPATIENT
Start: 2018-10-08

## 2018-11-02 ENCOUNTER — HOSPITAL ENCOUNTER (OUTPATIENT)
Dept: LAB | Facility: MEDICAL CENTER | Age: 73
End: 2018-11-02
Attending: FAMILY MEDICINE
Payer: MEDICARE

## 2018-11-02 DIAGNOSIS — N18.30 CHRONIC KIDNEY DISEASE (CKD), STAGE III (MODERATE) (HCC): ICD-10-CM

## 2018-11-02 LAB
ALBUMIN SERPL BCP-MCNC: 4.3 G/DL (ref 3.2–4.9)
ALBUMIN/GLOB SERPL: 1.6 G/DL
ALP SERPL-CCNC: 53 U/L (ref 30–99)
ALT SERPL-CCNC: 23 U/L (ref 2–50)
ANION GAP SERPL CALC-SCNC: 8 MMOL/L (ref 0–11.9)
AST SERPL-CCNC: 24 U/L (ref 12–45)
BILIRUB SERPL-MCNC: 0.6 MG/DL (ref 0.1–1.5)
BUN SERPL-MCNC: 23 MG/DL (ref 8–22)
CALCIUM SERPL-MCNC: 9.8 MG/DL (ref 8.5–10.5)
CHLORIDE SERPL-SCNC: 110 MMOL/L (ref 96–112)
CO2 SERPL-SCNC: 24 MMOL/L (ref 20–33)
CREAT SERPL-MCNC: 1.68 MG/DL (ref 0.5–1.4)
CREAT UR-MCNC: 98.4 MG/DL
EST. AVERAGE GLUCOSE BLD GHB EST-MCNC: 157 MG/DL
FASTING STATUS PATIENT QL REPORTED: NORMAL
GLOBULIN SER CALC-MCNC: 2.7 G/DL (ref 1.9–3.5)
GLUCOSE SERPL-MCNC: 95 MG/DL (ref 65–99)
HBA1C MFR BLD: 7.1 % (ref 0–5.6)
MICROALBUMIN UR-MCNC: 1 MG/DL
MICROALBUMIN/CREAT UR: 10 MG/G (ref 0–30)
POTASSIUM SERPL-SCNC: 4.2 MMOL/L (ref 3.6–5.5)
PROT SERPL-MCNC: 7 G/DL (ref 6–8.2)
SODIUM SERPL-SCNC: 142 MMOL/L (ref 135–145)

## 2018-11-02 PROCEDURE — 83036 HEMOGLOBIN GLYCOSYLATED A1C: CPT | Mod: GA

## 2018-11-02 PROCEDURE — 82043 UR ALBUMIN QUANTITATIVE: CPT

## 2018-11-02 PROCEDURE — 80053 COMPREHEN METABOLIC PANEL: CPT

## 2018-11-02 PROCEDURE — 36415 COLL VENOUS BLD VENIPUNCTURE: CPT

## 2018-11-02 PROCEDURE — 82570 ASSAY OF URINE CREATININE: CPT

## 2018-11-29 ENCOUNTER — OFFICE VISIT (OUTPATIENT)
Dept: MEDICAL GROUP | Facility: PHYSICIAN GROUP | Age: 73
End: 2018-11-29
Payer: MEDICARE

## 2018-11-29 VITALS
OXYGEN SATURATION: 97 % | SYSTOLIC BLOOD PRESSURE: 152 MMHG | WEIGHT: 186.6 LBS | HEIGHT: 68 IN | HEART RATE: 74 BPM | BODY MASS INDEX: 28.28 KG/M2 | RESPIRATION RATE: 16 BRPM | TEMPERATURE: 98.3 F | DIASTOLIC BLOOD PRESSURE: 94 MMHG

## 2018-11-29 DIAGNOSIS — N18.30 CHRONIC KIDNEY DISEASE (CKD), STAGE III (MODERATE) (HCC): ICD-10-CM

## 2018-11-29 DIAGNOSIS — E03.8 OTHER SPECIFIED HYPOTHYROIDISM: ICD-10-CM

## 2018-11-29 DIAGNOSIS — E78.1 HYPERTRIGLYCERIDEMIA: ICD-10-CM

## 2018-11-29 PROCEDURE — 99214 OFFICE O/P EST MOD 30 MIN: CPT | Performed by: FAMILY MEDICINE

## 2018-11-29 RX ORDER — FENOFIBRATE 48 MG/1
48 TABLET, COATED ORAL DAILY
Qty: 90 TAB | Refills: 3 | Status: SHIPPED | OUTPATIENT
Start: 2018-11-29 | End: 2019-06-19 | Stop reason: SDUPTHER

## 2018-11-29 NOTE — PROGRESS NOTES
Subjective:   Kenny Leija is a 73 y.o. male here today for evaluation and management of:     Uncontrolled type 2 diabetes mellitus with stage 3 chronic kidney disease, without long-term current use of insulin (CMS-HCC)  Improving steadily now A1c down to 7.1  He is very regular in checking blood sugars.   Refills on strips done today for contour next meter.   He takes januvia 100mg daily, glimepiride 2 mg bid  Is on simvastatin 40 mg, asa 81 mg.   GFR is stable in 40s: encouraged hydration with water, avoid salt, avoid NSAIDS.     Chronic kidney disease (CKD), stage III (moderate)  GFR is stable in 40s: encouraged hydration with water, avoid salt, avoid NSAIDS.     Other specified hypothyroidism  Normal TSH level on levothyroxine 50 mcg  Recheck labs every year  No weight swings or skin or hair changes.     Hypertriglyceridemia  Cost of fenofibrate 134mg capsule is $300 or more. Unreasonable cost.   Will change to fenofibrate 48 mg and see if this is better cost.   Continues on simvastatin 40 mg.   Results for KENNY LEIJA (MRN [id number]) as of 11/29/2018 09:53   Ref. Range 4/10/2017 06:10 8/14/2017 06:29 8/14/2017 06:30 2/15/2018 06:10   Cholesterol,Tot Latest Ref Range: 100 - 199 mg/dL 264 (H)  122 106   Triglycerides Latest Ref Range: 0 - 149 mg/dL 1,135 (H)  289 (H) 232 (H)   HDL Latest Ref Range: >=40 mg/dL see below  28 (A) 28 (A)   LDL Latest Ref Range: <100 mg/dL see below  36 32   Will order recheck on labs. He has a healthy diet.          Current medicines (including changes today)  Current Outpatient Prescriptions   Medication Sig Dispense Refill   • Blood Glucose Test Strips Test strips order: Test strips for Contour Next meter. Sig: use once a dayand prn ssx high or low sugar #100 RF x 3 100 Strip 11   • fenofibrate (TRICOR) 48 MG Tab Take 1 Tab by mouth every day. 90 Tab 3   • levothyroxine (SYNTHROID) 50 MCG Tab TAKE 1 TABLET BY MOUTH EVERY MORNING ON AN EMPTY STOMACH 90 Tab 3   • glucose blood (EMPERATRIZ  "CONTOUR NEXT TEST) strip 1 Strip by Other route every day. 100 Strip 3   • glimepiride (AMARYL) 2 MG Tab Take 1 Tab by mouth 2 times a day. 180 Tab 3   • SITagliptin (JANUVIA) 100 MG Tab Take 1 Tab by mouth every day. 90 Tab 3   • simvastatin (ZOCOR) 40 MG Tab TAKE 1 TABLET BY MOUTH EVERY EVENING 90 Tab 3   • Diclofenac Sodium 1 % Cream Apply 1 g to skin as directed 2 Times a Day. 60 g 3   • aspirin 81 MG tablet Take 1 Tab by mouth every day. 100 Tab 3   • vitamin D (CHOLECALCIFEROL) 1000 UNIT Tab Take 1,000 Units by mouth every day.       No current facility-administered medications for this visit.      He  has a past medical history of Diabetes mellitus type II; Elevated BP (9/9/2010); Hearing decreased; Hyperlipidemia (12/1/2010); Hypertriglyceridemia (12/1/2010); Renal stone; and Transaminitis (4/12/2011).    ROS  No chest pain, no shortness of breath, no abdominal pain       Objective:     Blood pressure 152/94, pulse 74, temperature 36.8 °C (98.3 °F), temperature source Temporal, resp. rate 16, height 1.727 m (5' 8\"), weight 84.6 kg (186 lb 9.6 oz), SpO2 97 %. Body mass index is 28.37 kg/m².   Physical Exam:  Constitutional: Alert, no distress.  Skin: Warm, dry, good turgor, no rashes in visible areas.  Eye: Equal, round and reactive, conjunctiva clear, lids normal.  ENMT: Lips without lesions, good dentition, oropharynx clear.  Neck: Trachea midline, no masses, no thyromegaly. No cervical or supraclavicular lymphadenopathy  Respiratory: Unlabored respiratory effort, lungs clear to auscultation, no wheezes, no ronchi.  Cardiovascular: Normal S1, S2, no murmur, no edema.  Abdomen: Soft, non-tender, no masses, no hepatosplenomegaly.  Psych: Alert and oriented x3, normal affect and mood.        Assessment and Plan:   The following treatment plan was discussed    1. Uncontrolled type 2 diabetes mellitus with stage 3 chronic kidney disease, without long-term current use of insulin (HCC)  improving  - HEMOGLOBIN " A1C; Future    2. Chronic kidney disease (CKD), stage III (moderate) (HCC)  Stable.   - Renal Function Panel; Future    3. Other specified hypothyroidism  stable    4. Hypertriglyceridemia  Changing to fenofibrate 48 mg due to cost  Recheck labs.   - Lipid Profile; Future      Followup: Return in about 6 months (around 5/29/2019) for DM, high triglycerides, CKD.

## 2018-11-29 NOTE — ASSESSMENT & PLAN NOTE
Normal TSH level on levothyroxine 50 mcg  Recheck labs every year  No weight swings or skin or hair changes.

## 2018-11-29 NOTE — ASSESSMENT & PLAN NOTE
Cost of fenofibrate 134mg capsule is $300 or more. Unreasonable cost.   Will change to fenofibrate 48 mg and see if this is better cost.   Continues on simvastatin 40 mg.   Results for JAD KENNY (MRN [id number]) as of 11/29/2018 09:53   Ref. Range 4/10/2017 06:10 8/14/2017 06:29 8/14/2017 06:30 2/15/2018 06:10   Cholesterol,Tot Latest Ref Range: 100 - 199 mg/dL 264 (H)  122 106   Triglycerides Latest Ref Range: 0 - 149 mg/dL 1,135 (H)  289 (H) 232 (H)   HDL Latest Ref Range: >=40 mg/dL see below  28 (A) 28 (A)   LDL Latest Ref Range: <100 mg/dL see below  36 32   Will order recheck on labs. He has a healthy diet.

## 2018-11-29 NOTE — ASSESSMENT & PLAN NOTE
Improving steadily now A1c down to 7.1  He is very regular in checking blood sugars.   Refills on strips done today for contour next meter.   He takes januvia 100mg daily, glimepiride 2 mg bid  Is on simvastatin 40 mg, asa 81 mg.   GFR is stable in 40s: encouraged hydration with water, avoid salt, avoid NSAIDS.

## 2019-04-01 RX ORDER — SITAGLIPTIN 100 MG/1
TABLET, FILM COATED ORAL
Qty: 90 TAB | Refills: 0 | Status: SHIPPED | OUTPATIENT
Start: 2019-04-01 | End: 2019-06-19 | Stop reason: SDUPTHER

## 2019-04-01 NOTE — TELEPHONE ENCOUNTER
Has upcoming appt w/ PCP. Will send 3 months of fills to pharmacy. Pt was originally changed to Trulicity then changed back to Januvia as pt couldn't tolerate injection.

## 2019-04-01 NOTE — TELEPHONE ENCOUNTER
*Medication is D/C'd on patients med list*  Was the patient seen in the last year in this department? Yes    Does patient have an active prescription for medications requested? No     Received Request Via: Pharmacy    Pt met protocol?: Yes     Last OV 11/2018    Lab Results   Component Value Date/Time    HBA1C 7.1 (H) 11/02/2018 07:11 AM        Lab Results  Component Value Date/Time   CHOLSTRLTOT 106 02/15/2018 0610   TRIGLYCERIDE 232 (H) 02/15/2018 0610   HDL 28 (A) 02/15/2018 0610   LDL 32 02/15/2018 0610

## 2019-04-16 DIAGNOSIS — E78.01 FAMILIAL HYPERCHOLESTEROLEMIA: ICD-10-CM

## 2019-04-17 RX ORDER — SIMVASTATIN 40 MG
TABLET ORAL
Qty: 90 TAB | Refills: 0 | Status: SHIPPED | OUTPATIENT
Start: 2019-04-17 | End: 2019-06-19 | Stop reason: SDUPTHER

## 2019-04-17 NOTE — TELEPHONE ENCOUNTER
Was the patient seen in the last year in this department? Yes    Does patient have an active prescription for medications requested? No     Received Request Via: Pharmacy      Pt met protocol?: Yes, OV 11/18   Lab Results   Component Value Date/Time    CHOLSTRLTOT 106 02/15/2018 06:10 AM    LDL 32 02/15/2018 06:10 AM    HDL 28 (A) 02/15/2018 06:10 AM    TRIGLYCERIDE 232 (H) 02/15/2018 06:10 AM       Lab Results   Component Value Date/Time    SODIUM 142 11/02/2018 07:11 AM    POTASSIUM 4.2 11/02/2018 07:11 AM    CHLORIDE 110 11/02/2018 07:11 AM    CO2 24 11/02/2018 07:11 AM    GLUCOSE 95 11/02/2018 07:11 AM    BUN 23 (H) 11/02/2018 07:11 AM    CREATININE 1.68 (H) 11/02/2018 07:11 AM     Lab Results   Component Value Date/Time    ALKPHOSPHAT 53 11/02/2018 07:11 AM    ASTSGOT 24 11/02/2018 07:11 AM    ALTSGPT 23 11/02/2018 07:11 AM    TBILIRUBIN 0.6 11/02/2018 07:11 AM

## 2019-06-10 ENCOUNTER — HOSPITAL ENCOUNTER (OUTPATIENT)
Dept: LAB | Facility: MEDICAL CENTER | Age: 74
End: 2019-06-10
Attending: FAMILY MEDICINE
Payer: MEDICARE

## 2019-06-10 DIAGNOSIS — N18.30 CHRONIC KIDNEY DISEASE (CKD), STAGE III (MODERATE) (HCC): ICD-10-CM

## 2019-06-10 DIAGNOSIS — E78.1 HYPERTRIGLYCERIDEMIA: ICD-10-CM

## 2019-06-10 LAB
ALBUMIN SERPL BCP-MCNC: 4.5 G/DL (ref 3.2–4.9)
BUN SERPL-MCNC: 31 MG/DL (ref 8–22)
CALCIUM SERPL-MCNC: 9.6 MG/DL (ref 8.5–10.5)
CHLORIDE SERPL-SCNC: 108 MMOL/L (ref 96–112)
CHOLEST SERPL-MCNC: 109 MG/DL (ref 100–199)
CO2 SERPL-SCNC: 22 MMOL/L (ref 20–33)
CREAT SERPL-MCNC: 1.64 MG/DL (ref 0.5–1.4)
EST. AVERAGE GLUCOSE BLD GHB EST-MCNC: 143 MG/DL
FASTING STATUS PATIENT QL REPORTED: NORMAL
GLUCOSE SERPL-MCNC: 103 MG/DL (ref 65–99)
HBA1C MFR BLD: 6.6 % (ref 0–5.6)
HDLC SERPL-MCNC: 28 MG/DL
LDLC SERPL CALC-MCNC: 36 MG/DL
PHOSPHATE SERPL-MCNC: 3.7 MG/DL (ref 2.5–4.5)
POTASSIUM SERPL-SCNC: 4 MMOL/L (ref 3.6–5.5)
SODIUM SERPL-SCNC: 139 MMOL/L (ref 135–145)
TRIGL SERPL-MCNC: 226 MG/DL (ref 0–149)

## 2019-06-10 PROCEDURE — 80061 LIPID PANEL: CPT

## 2019-06-10 PROCEDURE — 36415 COLL VENOUS BLD VENIPUNCTURE: CPT

## 2019-06-10 PROCEDURE — 80069 RENAL FUNCTION PANEL: CPT

## 2019-06-10 PROCEDURE — 83036 HEMOGLOBIN GLYCOSYLATED A1C: CPT | Mod: GA

## 2019-06-19 ENCOUNTER — OFFICE VISIT (OUTPATIENT)
Dept: MEDICAL GROUP | Facility: PHYSICIAN GROUP | Age: 74
End: 2019-06-19
Payer: MEDICARE

## 2019-06-19 VITALS
BODY MASS INDEX: 26.05 KG/M2 | RESPIRATION RATE: 14 BRPM | WEIGHT: 182 LBS | HEIGHT: 70 IN | SYSTOLIC BLOOD PRESSURE: 130 MMHG | OXYGEN SATURATION: 96 % | DIASTOLIC BLOOD PRESSURE: 66 MMHG | HEART RATE: 76 BPM | TEMPERATURE: 97.9 F

## 2019-06-19 DIAGNOSIS — E03.8 OTHER SPECIFIED HYPOTHYROIDISM: ICD-10-CM

## 2019-06-19 DIAGNOSIS — E78.01 FAMILIAL HYPERCHOLESTEROLEMIA: ICD-10-CM

## 2019-06-19 DIAGNOSIS — E78.00 PURE HYPERCHOLESTEROLEMIA: ICD-10-CM

## 2019-06-19 DIAGNOSIS — N18.30 CHRONIC KIDNEY DISEASE (CKD), STAGE III (MODERATE) (HCC): ICD-10-CM

## 2019-06-19 PROCEDURE — 99214 OFFICE O/P EST MOD 30 MIN: CPT | Performed by: FAMILY MEDICINE

## 2019-06-19 RX ORDER — SIMVASTATIN 40 MG
TABLET ORAL
Qty: 90 TAB | Refills: 3 | Status: SHIPPED | OUTPATIENT
Start: 2019-06-19 | End: 2020-07-31

## 2019-06-19 RX ORDER — FENOFIBRATE 48 MG/1
48 TABLET, COATED ORAL DAILY
Qty: 90 TAB | Refills: 3 | Status: ON HOLD | OUTPATIENT
Start: 2019-06-19 | End: 2019-10-01

## 2019-06-19 RX ORDER — LEVOTHYROXINE SODIUM 0.05 MG/1
50 TABLET ORAL EVERY MORNING
Qty: 90 TAB | Refills: 3 | Status: SHIPPED | OUTPATIENT
Start: 2019-06-19 | End: 2019-11-19

## 2019-06-19 RX ORDER — GLIMEPIRIDE 2 MG/1
2 TABLET ORAL 2 TIMES DAILY
Qty: 180 TAB | Refills: 3 | Status: SHIPPED | OUTPATIENT
Start: 2019-06-19 | End: 2019-10-29

## 2019-06-19 ASSESSMENT — PATIENT HEALTH QUESTIONNAIRE - PHQ9: CLINICAL INTERPRETATION OF PHQ2 SCORE: 0

## 2019-06-19 NOTE — PROGRESS NOTES
Subjective:   Kenny Leija is a 74 y.o. male here today for evaluation and management of:     Chronic kidney disease (CKD), stage III (moderate)  Stable ckd stage 3  Results for KENNY LEIJA (MRN [id number]) as of 6/19/2019 07:46   Ref. Range 8/16/2018 07:18 11/2/2018 07:10 11/2/2018 07:11 6/10/2019 06:46   Creatinine Latest Ref Range: 0.50 - 1.40 mg/dL 1.63 (H)  1.68 (H) 1.64 (H)   GFR If  Latest Ref Range: >60 mL/min/1.73 m 2 50 (A)  48 (A) 50 (A)   GFR If Non  Latest Ref Range: >60 mL/min/1.73 m 2 41 (A)  40 (A) 41 (A)       Uncontrolled type 2 diabetes mellitus with stage 3 chronic kidney disease, without long-term current use of insulin (CMS-HCC)  Now controlled DM2 A1c 6.6  LDL 36  GFR 41 normal urine microalbumin  Medications: glimepiride 2 mg bid januvia 100mg  Also simvastatin, fenofibrate, asa    Hyperlipidemia  Chronic condition, continues on simvastatin 40 mg and fenofibrate 48 mg  Results for KENNY LEIJA (MRN [id number]) as of 6/19/2019 07:46   Ref. Range 6/10/2019 06:46   Cholesterol,Tot Latest Ref Range: 100 - 199 mg/dL 109   Triglycerides Latest Ref Range: 0 - 149 mg/dL 226 (H)   HDL Latest Ref Range: >=40 mg/dL 28 (A)   LDL Latest Ref Range: <100 mg/dL 36       Other specified hypothyroidism  Last tSH 2018 in normal range on levothyroxine 50 mg          Current medicines (including changes today)  Current Outpatient Prescriptions   Medication Sig Dispense Refill   • fenofibrate (TRICOR) 48 MG Tab Take 1 Tab by mouth every day. 90 Tab 3   • glimepiride (AMARYL) 2 MG Tab Take 1 Tab by mouth 2 times a day. 180 Tab 3   • glucose blood (EMPERATRIZ CONTOUR NEXT TEST) strip 1 Strip by Other route every day. 100 Strip 3   • SITagliptin (JANUVIA) 100 MG Tab Take 1 Tab by mouth every day. 90 Tab 3   • levothyroxine (SYNTHROID) 50 MCG Tab Take 1 Tab by mouth every morning. ON A EMPTY STOMACH 90 Tab 3   • simvastatin (ZOCOR) 40 MG Tab TAKE 1 TABLET BY MOUTH EVERY EVENING 90 Tab 3   •  "Blood Glucose Test Strips Test strips order: Test strips for Contour Next meter. Sig: use once a dayand prn ssx high or low sugar #100 RF x 3 100 Strip 11   • Diclofenac Sodium 1 % Cream Apply 1 g to skin as directed 2 Times a Day. 60 g 3   • aspirin 81 MG tablet Take 1 Tab by mouth every day. 100 Tab 3   • vitamin D (CHOLECALCIFEROL) 1000 UNIT Tab Take 1,000 Units by mouth every day.       No current facility-administered medications for this visit.      He  has a past medical history of Diabetes mellitus type II; Elevated BP (9/9/2010); Hearing decreased; Hyperlipidemia (12/1/2010); Hypertriglyceridemia (12/1/2010); Renal stone; and Transaminitis (4/12/2011).    ROS  No chest pain, no shortness of breath, no abdominal pain       Objective:     /66 (BP Location: Left arm, Patient Position: Sitting, BP Cuff Size: Adult)   Pulse 76   Temp 36.6 °C (97.9 °F) (Temporal)   Resp 14   Ht 1.778 m (5' 10\")   Wt 82.6 kg (182 lb)   SpO2 96%  Body mass index is 26.11 kg/m².   Physical Exam:  Constitutional: Alert, no distress.  Skin: Warm, dry, good turgor, no rashes in visible areas.  Eye: Equal, round and reactive, conjunctiva clear, lids normal.  ENMT: Lips without lesions, good dentition, oropharynx clear.  Neck: Trachea midline, no masses, no thyromegaly. No cervical or supraclavicular lymphadenopathy  Respiratory: Unlabored respiratory effort, lungs clear to auscultation, no wheezes, no ronchi.  Cardiovascular: Normal S1, S2, no murmur, no edema.  Abdomen: Soft, non-tender, no masses, no hepatosplenomegaly.  Psych: Alert and oriented x3, normal affect and mood.        Assessment and Plan:   The following treatment plan was discussed    1. Chronic kidney disease (CKD), stage III (moderate) (HCC)  Stable.     2. Uncontrolled type 2 diabetes mellitus with stage 3 chronic kidney disease, without long-term current use of insulin (HCC)  Controlled.     3. Pure hypercholesterolemia  Improving continue current " medications.     4. Other specified hypothyroidism  Recheck labs.       Followup: Return in about 6 months (around 12/19/2019) for ckd, hypothyroid, DM labs review.

## 2019-06-19 NOTE — ASSESSMENT & PLAN NOTE
Stable ckd stage 3  Results for KENNY STREET (MRN [id number]) as of 6/19/2019 07:46   Ref. Range 8/16/2018 07:18 11/2/2018 07:10 11/2/2018 07:11 6/10/2019 06:46   Creatinine Latest Ref Range: 0.50 - 1.40 mg/dL 1.63 (H)  1.68 (H) 1.64 (H)   GFR If  Latest Ref Range: >60 mL/min/1.73 m 2 50 (A)  48 (A) 50 (A)   GFR If Non  Latest Ref Range: >60 mL/min/1.73 m 2 41 (A)  40 (A) 41 (A)

## 2019-06-19 NOTE — ASSESSMENT & PLAN NOTE
Chronic condition, continues on simvastatin 40 mg and fenofibrate 48 mg  Results for KENNY STREET (MRN [id number]) as of 6/19/2019 07:46   Ref. Range 6/10/2019 06:46   Cholesterol,Tot Latest Ref Range: 100 - 199 mg/dL 109   Triglycerides Latest Ref Range: 0 - 149 mg/dL 226 (H)   HDL Latest Ref Range: >=40 mg/dL 28 (A)   LDL Latest Ref Range: <100 mg/dL 36

## 2019-06-19 NOTE — ASSESSMENT & PLAN NOTE
Now controlled DM2 A1c 6.6  LDL 36  GFR 41 normal urine microalbumin  Medications: glimepiride 2 mg bid januvia 100mg  Also simvastatin, fenofibrate, asa

## 2019-08-01 ENCOUNTER — HOSPITAL ENCOUNTER (OUTPATIENT)
Dept: LAB | Facility: MEDICAL CENTER | Age: 74
End: 2019-08-01
Attending: PHYSICIAN ASSISTANT
Payer: MEDICARE

## 2019-08-01 PROCEDURE — 80048 BASIC METABOLIC PNL TOTAL CA: CPT

## 2019-08-02 LAB
ANION GAP SERPL CALC-SCNC: 13 MMOL/L (ref 0–11.9)
BUN SERPL-MCNC: 24 MG/DL (ref 8–22)
CALCIUM SERPL-MCNC: 9.8 MG/DL (ref 8.5–10.5)
CHLORIDE SERPL-SCNC: 105 MMOL/L (ref 96–112)
CO2 SERPL-SCNC: 23 MMOL/L (ref 20–33)
CREAT SERPL-MCNC: 1.73 MG/DL (ref 0.5–1.4)
GLUCOSE SERPL-MCNC: 124 MG/DL (ref 65–99)
POTASSIUM SERPL-SCNC: 4.4 MMOL/L (ref 3.6–5.5)
SODIUM SERPL-SCNC: 141 MMOL/L (ref 135–145)

## 2019-08-30 ENCOUNTER — HOSPITAL ENCOUNTER (OUTPATIENT)
Dept: LAB | Facility: MEDICAL CENTER | Age: 74
End: 2019-08-30
Attending: UROLOGY
Payer: MEDICARE

## 2019-08-30 LAB
AMBIGUOUS DTTM AMBI4: NORMAL
ANION GAP SERPL CALC-SCNC: 10 MMOL/L (ref 0–11.9)
BUN SERPL-MCNC: 23 MG/DL (ref 8–22)
CALCIUM SERPL-MCNC: 9.6 MG/DL (ref 8.5–10.5)
CHLORIDE SERPL-SCNC: 109 MMOL/L (ref 96–112)
CO2 SERPL-SCNC: 23 MMOL/L (ref 20–33)
CREAT SERPL-MCNC: 1.68 MG/DL (ref 0.5–1.4)
GLUCOSE SERPL-MCNC: 130 MG/DL (ref 65–99)
POTASSIUM SERPL-SCNC: 4.4 MMOL/L (ref 3.6–5.5)
SODIUM SERPL-SCNC: 142 MMOL/L (ref 135–145)

## 2019-08-30 PROCEDURE — 80048 BASIC METABOLIC PNL TOTAL CA: CPT

## 2019-08-30 PROCEDURE — 87086 URINE CULTURE/COLONY COUNT: CPT

## 2019-09-01 LAB
BACTERIA UR CULT: NORMAL
SIGNIFICANT IND 70042: NORMAL
SITE SITE: NORMAL
SOURCE SOURCE: NORMAL

## 2019-09-10 ENCOUNTER — TELEPHONE (OUTPATIENT)
Dept: MEDICAL GROUP | Facility: MEDICAL CENTER | Age: 74
End: 2019-09-10

## 2019-09-10 NOTE — TELEPHONE ENCOUNTER
Received phone call from the healthcare pharmacy that the prescription I sent was not for samples which was supposed to be noted on the prescription itself.  Patient will receive samples and they will make the change for us.

## 2019-09-17 ENCOUNTER — HOSPITAL ENCOUNTER (OUTPATIENT)
Dept: LAB | Facility: MEDICAL CENTER | Age: 74
End: 2019-09-17
Attending: UROLOGY
Payer: MEDICARE

## 2019-09-17 LAB
ANION GAP SERPL CALC-SCNC: 8 MMOL/L (ref 0–11.9)
BUN SERPL-MCNC: 21 MG/DL (ref 8–22)
CALCIUM SERPL-MCNC: 9.5 MG/DL (ref 8.5–10.5)
CHLORIDE SERPL-SCNC: 105 MMOL/L (ref 96–112)
CO2 SERPL-SCNC: 24 MMOL/L (ref 20–33)
CREAT SERPL-MCNC: 1.69 MG/DL (ref 0.5–1.4)
GLUCOSE SERPL-MCNC: 211 MG/DL (ref 65–99)
POTASSIUM SERPL-SCNC: 4.1 MMOL/L (ref 3.6–5.5)
SODIUM SERPL-SCNC: 137 MMOL/L (ref 135–145)

## 2019-09-17 PROCEDURE — 80048 BASIC METABOLIC PNL TOTAL CA: CPT

## 2019-09-17 PROCEDURE — 36415 COLL VENOUS BLD VENIPUNCTURE: CPT

## 2019-09-18 ENCOUNTER — HOSPITAL ENCOUNTER (OUTPATIENT)
Facility: MEDICAL CENTER | Age: 74
End: 2019-09-18
Attending: UROLOGY
Payer: MEDICARE

## 2019-09-18 PROCEDURE — 87086 URINE CULTURE/COLONY COUNT: CPT

## 2019-09-20 LAB
BACTERIA UR CULT: NORMAL
SIGNIFICANT IND 70042: NORMAL
SITE SITE: NORMAL
SOURCE SOURCE: NORMAL

## 2019-09-27 RX ORDER — TAMSULOSIN HYDROCHLORIDE 0.4 MG/1
0.4 CAPSULE ORAL EVERY EVENING
COMMUNITY
End: 2021-09-10

## 2019-09-27 NOTE — OR NURSING
Preadmit note:  Patient unable to come in for preadmit apppointment.  No tele appts available.  I reviewed history, medications and instructions with patient over the phone.

## 2019-10-01 ENCOUNTER — ANESTHESIA (OUTPATIENT)
Dept: SURGERY | Facility: MEDICAL CENTER | Age: 74
End: 2019-10-01
Payer: MEDICARE

## 2019-10-01 ENCOUNTER — APPOINTMENT (OUTPATIENT)
Dept: RADIOLOGY | Facility: MEDICAL CENTER | Age: 74
End: 2019-10-01
Attending: UROLOGY
Payer: MEDICARE

## 2019-10-01 ENCOUNTER — HOSPITAL ENCOUNTER (OUTPATIENT)
Facility: MEDICAL CENTER | Age: 74
End: 2019-10-01
Attending: UROLOGY | Admitting: UROLOGY
Payer: MEDICARE

## 2019-10-01 ENCOUNTER — ANESTHESIA EVENT (OUTPATIENT)
Dept: SURGERY | Facility: MEDICAL CENTER | Age: 74
End: 2019-10-01
Payer: MEDICARE

## 2019-10-01 VITALS
HEIGHT: 66 IN | SYSTOLIC BLOOD PRESSURE: 148 MMHG | WEIGHT: 173.5 LBS | HEART RATE: 64 BPM | TEMPERATURE: 97.3 F | RESPIRATION RATE: 16 BRPM | DIASTOLIC BLOOD PRESSURE: 62 MMHG | OXYGEN SATURATION: 100 % | BODY MASS INDEX: 27.88 KG/M2

## 2019-10-01 LAB
EKG IMPRESSION: NORMAL
GLUCOSE BLD-MCNC: 98 MG/DL (ref 65–99)
PATHOLOGY CONSULT NOTE: NORMAL

## 2019-10-01 PROCEDURE — 88300 SURGICAL PATH GROSS: CPT

## 2019-10-01 PROCEDURE — 501329 HCHG SET, CYSTO IRRIG Y TUR: Performed by: UROLOGY

## 2019-10-01 PROCEDURE — 93010 ELECTROCARDIOGRAM REPORT: CPT | Performed by: INTERNAL MEDICINE

## 2019-10-01 PROCEDURE — 700117 HCHG RX CONTRAST REV CODE 255: Performed by: UROLOGY

## 2019-10-01 PROCEDURE — 160025 RECOVERY II MINUTES (STATS): Performed by: UROLOGY

## 2019-10-01 PROCEDURE — 160035 HCHG PACU - 1ST 60 MINS PHASE I: Performed by: UROLOGY

## 2019-10-01 PROCEDURE — 700105 HCHG RX REV CODE 258: Performed by: UROLOGY

## 2019-10-01 PROCEDURE — 82962 GLUCOSE BLOOD TEST: CPT

## 2019-10-01 PROCEDURE — 700101 HCHG RX REV CODE 250: Performed by: ANESTHESIOLOGY

## 2019-10-01 PROCEDURE — 502240 HCHG MISC OR SUPPLY RC 0272: Performed by: UROLOGY

## 2019-10-01 PROCEDURE — C1769 GUIDE WIRE: HCPCS | Performed by: UROLOGY

## 2019-10-01 PROCEDURE — 93005 ELECTROCARDIOGRAM TRACING: CPT | Performed by: UROLOGY

## 2019-10-01 PROCEDURE — 700111 HCHG RX REV CODE 636 W/ 250 OVERRIDE (IP)

## 2019-10-01 PROCEDURE — 160048 HCHG OR STATISTICAL LEVEL 1-5: Performed by: UROLOGY

## 2019-10-01 PROCEDURE — C1758 CATHETER, URETERAL: HCPCS | Performed by: UROLOGY

## 2019-10-01 PROCEDURE — C1894 INTRO/SHEATH, NON-LASER: HCPCS | Performed by: UROLOGY

## 2019-10-01 PROCEDURE — 500879 HCHG PACK, CYSTO: Performed by: UROLOGY

## 2019-10-01 PROCEDURE — 700101 HCHG RX REV CODE 250: Performed by: UROLOGY

## 2019-10-01 PROCEDURE — 160029 HCHG SURGERY MINUTES - 1ST 30 MINS LEVEL 4: Performed by: UROLOGY

## 2019-10-01 PROCEDURE — 700111 HCHG RX REV CODE 636 W/ 250 OVERRIDE (IP): Performed by: ANESTHESIOLOGY

## 2019-10-01 PROCEDURE — 160046 HCHG PACU - 1ST 60 MINS PHASE II: Performed by: UROLOGY

## 2019-10-01 PROCEDURE — 160002 HCHG RECOVERY MINUTES (STAT): Performed by: UROLOGY

## 2019-10-01 PROCEDURE — 82365 CALCULUS SPECTROSCOPY: CPT

## 2019-10-01 PROCEDURE — 160041 HCHG SURGERY MINUTES - EA ADDL 1 MIN LEVEL 4: Performed by: UROLOGY

## 2019-10-01 PROCEDURE — 160009 HCHG ANES TIME/MIN: Performed by: UROLOGY

## 2019-10-01 PROCEDURE — C2617 STENT, NON-COR, TEM W/O DEL: HCPCS | Performed by: UROLOGY

## 2019-10-01 DEVICE — STENT UROLOGICAL POLARIS 6X22  ULTRA: Type: IMPLANTABLE DEVICE | Status: FUNCTIONAL

## 2019-10-01 DEVICE — STENT UROLOGICAL POLARIS 6X24  ULTRA: Type: IMPLANTABLE DEVICE | Status: FUNCTIONAL

## 2019-10-01 RX ORDER — MIDAZOLAM HYDROCHLORIDE 1 MG/ML
INJECTION INTRAMUSCULAR; INTRAVENOUS PRN
Status: DISCONTINUED | OUTPATIENT
Start: 2019-10-01 | End: 2019-10-01 | Stop reason: SURG

## 2019-10-01 RX ORDER — DIPHENHYDRAMINE HYDROCHLORIDE 50 MG/ML
12.5 INJECTION INTRAMUSCULAR; INTRAVENOUS
Status: DISCONTINUED | OUTPATIENT
Start: 2019-10-01 | End: 2019-10-01 | Stop reason: HOSPADM

## 2019-10-01 RX ORDER — LIDOCAINE HYDROCHLORIDE 10 MG/ML
INJECTION, SOLUTION EPIDURAL; INFILTRATION; INTRACAUDAL; PERINEURAL
Status: COMPLETED
Start: 2019-10-01 | End: 2019-10-01

## 2019-10-01 RX ORDER — FENOFIBRATE 134 MG/1
1 CAPSULE ORAL DAILY
COMMUNITY
End: 2019-10-28

## 2019-10-01 RX ORDER — LORAZEPAM 2 MG/ML
0.5 INJECTION INTRAMUSCULAR
Status: DISCONTINUED | OUTPATIENT
Start: 2019-10-01 | End: 2019-10-01 | Stop reason: HOSPADM

## 2019-10-01 RX ORDER — HYDRALAZINE HYDROCHLORIDE 20 MG/ML
5 INJECTION INTRAMUSCULAR; INTRAVENOUS
Status: DISCONTINUED | OUTPATIENT
Start: 2019-10-01 | End: 2019-10-01 | Stop reason: HOSPADM

## 2019-10-01 RX ORDER — ONDANSETRON 2 MG/ML
4 INJECTION INTRAMUSCULAR; INTRAVENOUS
Status: DISCONTINUED | OUTPATIENT
Start: 2019-10-01 | End: 2019-10-01 | Stop reason: HOSPADM

## 2019-10-01 RX ORDER — LABETALOL HYDROCHLORIDE 5 MG/ML
5 INJECTION, SOLUTION INTRAVENOUS
Status: DISCONTINUED | OUTPATIENT
Start: 2019-10-01 | End: 2019-10-01 | Stop reason: HOSPADM

## 2019-10-01 RX ORDER — HALOPERIDOL 5 MG/ML
1 INJECTION INTRAMUSCULAR
Status: DISCONTINUED | OUTPATIENT
Start: 2019-10-01 | End: 2019-10-01 | Stop reason: HOSPADM

## 2019-10-01 RX ORDER — HYDROMORPHONE HYDROCHLORIDE 1 MG/ML
0.2 INJECTION, SOLUTION INTRAMUSCULAR; INTRAVENOUS; SUBCUTANEOUS
Status: DISCONTINUED | OUTPATIENT
Start: 2019-10-01 | End: 2019-10-01 | Stop reason: HOSPADM

## 2019-10-01 RX ORDER — ROCURONIUM BROMIDE 10 MG/ML
INJECTION, SOLUTION INTRAVENOUS PRN
Status: DISCONTINUED | OUTPATIENT
Start: 2019-10-01 | End: 2019-10-01 | Stop reason: SURG

## 2019-10-01 RX ORDER — PHENYLEPHRINE HCL IN 0.9% NACL 0.5 MG/5ML
SYRINGE (ML) INTRAVENOUS PRN
Status: DISCONTINUED | OUTPATIENT
Start: 2019-10-01 | End: 2019-10-01 | Stop reason: SURG

## 2019-10-01 RX ORDER — CEFAZOLIN SODIUM 1 G/3ML
INJECTION, POWDER, FOR SOLUTION INTRAMUSCULAR; INTRAVENOUS PRN
Status: DISCONTINUED | OUTPATIENT
Start: 2019-10-01 | End: 2019-10-01 | Stop reason: SURG

## 2019-10-01 RX ORDER — DEXAMETHASONE SODIUM PHOSPHATE 4 MG/ML
INJECTION, SOLUTION INTRA-ARTICULAR; INTRALESIONAL; INTRAMUSCULAR; INTRAVENOUS; SOFT TISSUE PRN
Status: DISCONTINUED | OUTPATIENT
Start: 2019-10-01 | End: 2019-10-01 | Stop reason: SURG

## 2019-10-01 RX ORDER — SODIUM CHLORIDE, SODIUM LACTATE, POTASSIUM CHLORIDE, CALCIUM CHLORIDE 600; 310; 30; 20 MG/100ML; MG/100ML; MG/100ML; MG/100ML
INJECTION, SOLUTION INTRAVENOUS CONTINUOUS
Status: DISCONTINUED | OUTPATIENT
Start: 2019-10-01 | End: 2019-10-01 | Stop reason: HOSPADM

## 2019-10-01 RX ORDER — HYDROMORPHONE HYDROCHLORIDE 1 MG/ML
0.1 INJECTION, SOLUTION INTRAMUSCULAR; INTRAVENOUS; SUBCUTANEOUS
Status: DISCONTINUED | OUTPATIENT
Start: 2019-10-01 | End: 2019-10-01 | Stop reason: HOSPADM

## 2019-10-01 RX ORDER — HYDROMORPHONE HYDROCHLORIDE 1 MG/ML
0.4 INJECTION, SOLUTION INTRAMUSCULAR; INTRAVENOUS; SUBCUTANEOUS
Status: DISCONTINUED | OUTPATIENT
Start: 2019-10-01 | End: 2019-10-01 | Stop reason: HOSPADM

## 2019-10-01 RX ORDER — KETOROLAC TROMETHAMINE 30 MG/ML
INJECTION, SOLUTION INTRAMUSCULAR; INTRAVENOUS PRN
Status: DISCONTINUED | OUTPATIENT
Start: 2019-10-01 | End: 2019-10-01 | Stop reason: SURG

## 2019-10-01 RX ORDER — LIDOCAINE HYDROCHLORIDE 20 MG/ML
INJECTION, SOLUTION EPIDURAL; INFILTRATION; INTRACAUDAL; PERINEURAL PRN
Status: DISCONTINUED | OUTPATIENT
Start: 2019-10-01 | End: 2019-10-01 | Stop reason: SURG

## 2019-10-01 RX ORDER — MIDAZOLAM HYDROCHLORIDE 1 MG/ML
INJECTION INTRAMUSCULAR; INTRAVENOUS
Status: COMPLETED
Start: 2019-10-01 | End: 2019-10-01

## 2019-10-01 RX ORDER — MEPERIDINE HYDROCHLORIDE 25 MG/ML
25 INJECTION INTRAMUSCULAR; INTRAVENOUS; SUBCUTANEOUS
Status: DISCONTINUED | OUTPATIENT
Start: 2019-10-01 | End: 2019-10-01 | Stop reason: HOSPADM

## 2019-10-01 RX ORDER — ONDANSETRON 2 MG/ML
INJECTION INTRAMUSCULAR; INTRAVENOUS PRN
Status: DISCONTINUED | OUTPATIENT
Start: 2019-10-01 | End: 2019-10-01 | Stop reason: SURG

## 2019-10-01 RX ORDER — OXYCODONE HCL 5 MG/5 ML
10 SOLUTION, ORAL ORAL
Status: DISCONTINUED | OUTPATIENT
Start: 2019-10-01 | End: 2019-10-01 | Stop reason: HOSPADM

## 2019-10-01 RX ORDER — LIDOCAINE HYDROCHLORIDE 20 MG/ML
JELLY TOPICAL
Status: DISCONTINUED | OUTPATIENT
Start: 2019-10-01 | End: 2019-10-01 | Stop reason: HOSPADM

## 2019-10-01 RX ORDER — OXYCODONE HCL 5 MG/5 ML
5 SOLUTION, ORAL ORAL
Status: DISCONTINUED | OUTPATIENT
Start: 2019-10-01 | End: 2019-10-01 | Stop reason: HOSPADM

## 2019-10-01 RX ADMIN — DEXAMETHASONE SODIUM PHOSPHATE 8 MG: 4 INJECTION, SOLUTION INTRA-ARTICULAR; INTRALESIONAL; INTRAMUSCULAR; INTRAVENOUS; SOFT TISSUE at 09:30

## 2019-10-01 RX ADMIN — PROPOFOL 150 MG: 10 INJECTION, EMULSION INTRAVENOUS at 09:24

## 2019-10-01 RX ADMIN — KETOROLAC TROMETHAMINE 15 MG: 30 INJECTION, SOLUTION INTRAMUSCULAR at 09:37

## 2019-10-01 RX ADMIN — LIDOCAINE HYDROCHLORIDE 60 MG: 20 INJECTION, SOLUTION EPIDURAL; INFILTRATION; INTRACAUDAL at 09:24

## 2019-10-01 RX ADMIN — FENTANYL CITRATE 50 MCG: 50 INJECTION, SOLUTION INTRAMUSCULAR; INTRAVENOUS at 09:57

## 2019-10-01 RX ADMIN — MIDAZOLAM 2 MG: 1 INJECTION INTRAMUSCULAR; INTRAVENOUS at 09:19

## 2019-10-01 RX ADMIN — SODIUM CHLORIDE, POTASSIUM CHLORIDE, SODIUM LACTATE AND CALCIUM CHLORIDE: 600; 310; 30; 20 INJECTION, SOLUTION INTRAVENOUS at 08:25

## 2019-10-01 RX ADMIN — Medication 200 MCG: at 09:32

## 2019-10-01 RX ADMIN — LIDOCAINE HYDROCHLORIDE 0.5 ML: 10 INJECTION, SOLUTION EPIDURAL; INFILTRATION; INTRACAUDAL at 08:25

## 2019-10-01 RX ADMIN — CEFAZOLIN 2 G: 330 INJECTION, POWDER, FOR SOLUTION INTRAMUSCULAR; INTRAVENOUS at 09:24

## 2019-10-01 RX ADMIN — FENTANYL CITRATE 100 MCG: 50 INJECTION, SOLUTION INTRAMUSCULAR; INTRAVENOUS at 09:24

## 2019-10-01 RX ADMIN — Medication 0.5 ML: at 08:25

## 2019-10-01 RX ADMIN — SUGAMMADEX 200 MG: 100 INJECTION, SOLUTION INTRAVENOUS at 11:12

## 2019-10-01 RX ADMIN — ROCURONIUM BROMIDE 50 MG: 10 INJECTION, SOLUTION INTRAVENOUS at 09:24

## 2019-10-01 RX ADMIN — ONDANSETRON 4 MG: 2 INJECTION INTRAMUSCULAR; INTRAVENOUS at 09:30

## 2019-10-01 SDOH — HEALTH STABILITY: MENTAL HEALTH: HOW OFTEN DO YOU HAVE A DRINK CONTAINING ALCOHOL?: NEVER

## 2019-10-01 SDOH — HEALTH STABILITY: MENTAL HEALTH: HOW OFTEN DO YOU HAVE 6 OR MORE DRINKS ON ONE OCCASION?: NEVER

## 2019-10-01 ASSESSMENT — PAIN SCALES - GENERAL: PAIN_LEVEL: 0

## 2019-10-01 NOTE — ANESTHESIA PROCEDURE NOTES
Airway  Performed by: Chito Perez M.D.  Authorized by: Chito Perez M.D.     Location:  OR  Urgency:  Elective  Indications for Airway Management:  Anesthesia  Spontaneous Ventilation: absent    Sedation Level:  Deep  Preoxygenated: Yes    Final Airway Type:  Supraglottic airway  Final Supraglottic Airway:  Standard LMA  SGA Size:  4  Number of Attempts at Approach:  1

## 2019-10-01 NOTE — OR NURSING
1123: received to PACU via gurney with LMA.respirations even and unlabored LMA dc'd upon arrival to PACU without problem or difficulty.  1155: taking fluids well. Denies any pain or nausea.  1207: report called to Ada MATTA  1208: transported via gurney to PACU 2. Stable.

## 2019-10-01 NOTE — OR SURGEON
Immediate Post OP Note    PreOp Diagnosis: bilateral kidney stones    PostOp Diagnosis: same    Procedure(s):  CYSTOSCOPY, WITH URETERAL STENT INSERTION - Wound Class: Clean Contaminated  URETEROSCOPY - Wound Class: Clean Contaminated  LITHOTRIPSY, USING LASER - Wound Class: Clean Contaminated  Bilateral RPG    Surgeon(s):  Daniel Ko M.D.    Anesthesiologist/Type of Anesthesia:  Anesthesiologist: Chito Perez M.D./General    Surgical Staff:  Circulator: Jessica Kaiser R.N.  Relief Scrub: Kings Faustin  Scrub Person: Sushma Rajan R.N.; Alvaro Iniguez    Specimens removed if any:  ID Type Source Tests Collected by Time Destination   A : BILATERAL KIDNEY STONES FOR ANALYSIS Stone Kidney PATHOLOGY SPECIMEN Daniel Ko M.D. 10/1/2019 10:46 AM        Estimated Blood Loss: min    Findings: upper, mid, low pole stones on LEFT, largest 10mm; many small stones right kidney, largest 5mm.  22x6 stent on right.  24x6 stent on left.  No strings    Complications: none        10/1/2019 11:22 AM Daniel Ko M.D.

## 2019-10-01 NOTE — PROGRESS NOTES
Patient in pre-op, assessment completed, patient and wife darryl updated on plan of care, all questions answered, no further needs at this time, call light within reach.

## 2019-10-01 NOTE — OP REPORT
DATE OF SERVICE:  10/01/2019    NAME OF OPERATION:  1.  Left ureteroscopy, laser lithotripsy of left upper pole kidney stones.  2.  Laser lithotripsy of left mid pole kidney stone, separate procedure.  3.  Laser lithotripsy of left lower pole kidney stone, separate procedure.  4.  Bilateral retrograde pyelograms.  5.  Right ureteroscopy with laser lithotripsy.  6.  Bilateral ureteral stent placement.    PREOPERATIVE DIAGNOSIS:  Multiple bilateral kidney stones.    POSTOPERATIVE DIAGNOSIS:  Multiple bilateral kidney stones.    PRIMARY SURGEON:  Daniel Ko MD    ANESTHESIOLOGIST:  Chito Perez MD    FINDINGS:  Approximately 3 primary stones of the left kidney, largest 10 mm in   their respective positions plus many other small stones.  All larger stones   completely fragmented.  Many of the fragments basketed.  On right, dozens and   dozens of tiny round stones with the largest measuring approximately 5 mm,   completely lasered.  A 22 cm stent on the right, 24 cm x 6-Salvadorean stent on the   left.    INDICATIONS:  Briefly, the patient is a 74-year-old gentleman with a history   of bilateral kidney stone disease.  He underwent recent bilateral   ureteroscopic laser lithotripsy and stent placements by another treating   physician.  He presents now for more definitive management of his upper tract   stones.  Informed consent has been obtained.    OPERATION IN DETAIL:  Patient was taken to the operating room, placed on the   operating table in supine position.  After administration of general   anesthetic, he was placed in lithotomy.  Genitals were prepped and draped   sterilely.  A 21-Salvadorean cystoscope was passed in the bladder.  Prostate was   within normal limits.  In the bladder, there was significant amount of   redundant stent.  The left stent was identified with grasping forceps, brought   to the urethral meatus.  There was a string attached to the end of this   stent.  The stent was heavily encrusted  and a wire would not pass through   this.  I was, however, able to remove the stent and placed an open-ended   ureteral catheter at the left ureteral orifice.  Approximately 6 mL of full   strength contrast was injected delineating the true pass of the somewhat   dilated ureter.    A wire was then able to be passed through this access catheter to the left   kidney.  The access catheter was removed.    A 45 cm ureteral access sheath was then utilized to the level of the   ureteropelvic junction.  The inner cannula and the working wire were removed.    A digital flexible disposable ureteroscope was then passed up into the kidney.    The lower pole stones were first addressed.  There was a primary larger   stone that was basketed from this position into renal pelvis and fragmented to   completion.  Many of these stone fragments were then removed by basket.  In   the mid pole, another stone measuring approximately 8 mm was identified and   completely fragmented and similarly fragments were basketed free.  Finally, I   was able to get up into the upper pole kidney where another stone measuring   approximately 8 mm was identified and completely fragmented and then these   fragments basketed free.    On final inspection, there was a significant amount of stone dust and stone   debris from some of the early dusting of these larger stone fragments.  None   appeared larger than 2 mm.  The decision was made at this point to leave a   stent.  A wire was passed through the scope under direct visual inspection as   the entire scope was removed as was the access sheath.  There was no evidence   of ureteral injury nor ureteral fragments.    A 24 cm x 6-Nigerian ureteral stent was then placed in the usual manner.  Its   proximal J was seen in the left kidney and its distal J in the bladder.  There   was some redundant stent on this side as a result.    The right ureteral stent was then completely removed after the experience on   the  left.  Retrograde pyelogram was performed in a similar manner clearly   delineating the ureter and a wire was able to be passed up the true ureter to   the kidney.  A ureteral access catheter was removed and over the wire, the   access sheath was placed.  The scope was passed up into the right kidney and   nephroscopy was performed.  There were several dozen around small round stones   measuring approximately 1-2 mm.  In a midpole position, a 5 mm stone was   identified and a laser was used to fragment this as well as some of the   adjacent smaller stones.  Several fragments were also removed by basket.  On   final inspection, there appeared to be no significant residual renal stones on   this side after each esperanza was inspected in turn.    A wire was passed through the scope as the entire length ureter was inspected   as the scope was removed.  There was no evidence of ureteral injury nor   ureteral stone.  A 22 cm x 6-Kyrgyz ureteral stent was then placed in the   usual manner.  Its proximal J was seen curling in the right renal pelvis and   its distal J in the bladder.  Bladder was drained, and the case concluded.    Patient tolerated the procedure well and was taken to recovery room in stable   condition.     It is our intention that these stents will be removed in less than 1 week's   time in the office setting with cystoscopy.       ____________________________________     Daniel Ko MD MCM / NTS    DD:  10/01/2019 11:30:08  DT:  10/01/2019 13:00:24    D#:  [id number]  Job#:  [id number]

## 2019-10-01 NOTE — ANESTHESIA PREPROCEDURE EVALUATION
Relevant Problems      (+) Chronic kidney disease (CKD), stage III (moderate) (HCC)   (+) Fatty liver   (+) Recurrent nephrolithiasis      ENDO   (+) Other specified hypothyroidism   (+) Uncontrolled type 2 diabetes mellitus with stage 3 chronic kidney disease, without long-term current use of insulin (HCC)       Physical Exam    Airway   Mallampati: II  TM distance: >3 FB  Neck ROM: full       Cardiovascular - normal exam  Rhythm: regular  Rate: normal  (-) murmur     Dental - normal exam         Pulmonary - normal exam  Breath sounds clear to auscultation     Abdominal    Neurological - normal exam                 Anesthesia Plan    ASA 3       Plan - general       Airway plan will be ETT        Induction: intravenous    Postoperative Plan: Postoperative administration of opioids is intended.    Pertinent diagnostic labs and testing reviewed    Informed Consent:    Anesthetic plan and risks discussed with patient.    Use of blood products discussed with: patient whom consented to blood products.

## 2019-10-01 NOTE — DISCHARGE INSTRUCTIONS
ACTIVITY: Rest and take it easy for the first 24 hours.  A responsible adult is recommended to remain with you during that time.  It is normal to feel sleepy.  We encourage you to not do anything that requires balance, judgment or coordination.    MILD FLU-LIKE SYMPTOMS ARE NORMAL. YOU MAY EXPERIENCE GENERALIZED MUSCLE ACHES, THROAT IRRITATION, HEADACHE AND/OR SOME NAUSEA.    FOR 24 HOURS DO NOT:  Drive, operate machinery or run household appliances.  Drink beer or alcoholic beverages.   Make important decisions or sign legal documents.    SPECIAL INSTRUCTIONS: follow MD instructions    DIET: To avoid nausea, slowly advance diet as tolerated, avoiding spicy or greasy foods for the first day.  Add more substantial food to your diet according to your physician's instructions.  Babies can be fed formula or breast milk as soon as they are hungry.  INCREASE FLUIDS AND FIBER TO AVOID CONSTIPATION.    SURGICAL DRESSING/BATHING: NA    FOLLOW-UP APPOINTMENT:  A follow-up appointment should be arranged with your doctor in 1-2 weeks; call to schedule.    You should CALL YOUR PHYSICIAN if you develop:  Fever greater than 101 degrees F.  Pain not relieved by medication, or persistent nausea or vomiting.  Excessive bleeding (blood soaking through dressing) or unexpected drainage from the wound.  Extreme redness or swelling around the incision site, drainage of pus or foul smelling drainage.  Inability to urinate or empty your bladder within 8 hours.  Problems with breathing or chest pain.    You should call 911 if you develop problems with breathing or chest pain.  If you are unable to contact your doctor or surgical center, you should go to the nearest emergency room or urgent care center.  Physician's telephone #: [phone]    If any questions arise, call your doctor.  If your doctor is not available, please feel free to call the Surgical Center at [phone].  The Center is open Monday through Friday from 7AM to 7PM.  You  can also call the HEALTH HOTLINE open 24 hours/day, 7 days/week and speak to a nurse at [phone], or toll free at [phone].    A registered nurse may call you a few days after your surgery to see how you are doing after your procedure.    MEDICATIONS: Resume taking daily medication.  Take prescribed pain medication with food.  If no medication is prescribed, you may take non-aspirin pain medication if needed.  PAIN MEDICATION CAN BE VERY CONSTIPATING.  Take a stool softener or laxative such as senokot, pericolace, or milk of magnesia if needed.        If your physician has prescribed pain medication that includes Acetaminophen (Tylenol), do not take additional Acetaminophen (Tylenol) while taking the prescribed medication.    Depression / Suicide Risk    As you are discharged from this [facility] facility, it is important to learn how to keep safe from harming yourself.    Recognize the warning signs:  · Abrupt changes in personality, positive or negative- including increase in energy   · Giving away possessions  · Change in eating patterns- significant weight changes-  positive or negative  · Change in sleeping patterns- unable to sleep or sleeping all the time   · Unwillingness or inability to communicate  · Depression  · Unusual sadness, discouragement and loneliness  · Talk of wanting to die  · Neglect of personal appearance   · Rebelliousness- reckless behavior  · Withdrawal from people/activities they love  · Confusion- inability to concentrate     If you or a loved one observes any of these behaviors or has concerns about self-harm, here's what you can do:  · Talk about it- your feelings and reasons for harming yourself  · Remove any means that you might use to hurt yourself (examples: pills, rope, extension cords, firearm)  · Get professional help from the community (Mental Health, Substance Abuse, psychological counseling)  · Do not be alone:Call your Safe Contact- someone whom you trust who  will be there for you.  · Call your local CRISIS HOTLINE 215-5663 or 673-130-1698  · Call your local Children's Mobile Crisis Response Team [place] (400) 000-4837 or www.Spotbros  · Call the toll free National Suicide Prevention Hotlines   · National Suicide Prevention Lifeline 686-862-IIFG (0167)  · National Net[employer] Line Network 800-SUICIDE (208-0922)   No

## 2019-10-01 NOTE — ANESTHESIA QCDR
2019 [facility] Clinical Data Registry (for Quality Improvement)     Postoperative nausea/vomiting risk protocol (Adult = 18 yrs and Pediatric 3-17 yrs)- (430 and 463)  General inhalation anesthetic (NOT TIVA) with PONV risk factors: Yes  Provision of anti-emetic therapy with at least 2 different classes of agents: Yes   Patient DID NOT receive anti-emetic therapy and reason is documented in Medical Record:  N/A    Multimodal Pain Management- (AQI59)  Patient undergoing Elective Surgery (i.e. Outpatient, or ASC, or Prescheduled Surgery prior to Hospital Admission): Yes  Use of Multimodal Pain Management, two or more drugs and/or interventions, NOT including systemic opioids: Yes   Exception: Documented allergy to multiple classes of analgesics:  N/A    PACU assessment of acute postoperative pain prior to Anesthesia Care End- Applies to Patients Age = 18- (ABG7)  Initial PACU pain score is which of the following: < 7/10  Patient unable to report pain score: N/A    Post-anesthetic transfer of care checklist/protocol to PACU/ICU- (426 and 427)  Upon conclusion of case, patient transferred to which of the following locations: PACU/Non-ICU  Use of transfer checklist/protocol: Yes  Exclusion: Service Performed in Patient Hospital Room (and thus did not require transfer): N/A    PACU Reintubation- (AQI31)  General anesthesia requiring endotracheal intubation (ETT) along with subsequent extubation in OR or PACU: Yes  Required reintubation in the PACU: No   Extubation was a planned trial documented in the medical record prior to removal of the original airway device:  N/A    Unplanned admission to ICU related to anesthesia service up through end of PACU care- (MD51)  Unplanned admission to ICU (not initially anticipated at anesthesia start time): No

## 2019-10-01 NOTE — ANESTHESIA TIME REPORT
Anesthesia Start and Stop Event Times     Date Time Event    10/1/2019 0901 Ready for Procedure     0919 Anesthesia Start     1124 Anesthesia Stop        Responsible Staff  10/01/19    Name Role Begin End    Chito Perez M.D. Anesth 0919 1124        Preop Diagnosis (Free Text):  Pre-op Diagnosis     CALCULUS OF BILATERAL KIDNEYS        Preop Diagnosis (Codes):    Post op Diagnosis  Ureteral stone      Premium Reason  Non-Premium    Comments:

## 2019-10-01 NOTE — PROGRESS NOTES
1215 Pt in phase 2, denies pain/nausea. Pt states he has been through similar surgery in the past and is ready to dc home.  Pt wife at bedside, dc instructions reviewed.    1230- pt ambulated to restroom safely,  voided without complication.

## 2019-10-01 NOTE — ANESTHESIA POSTPROCEDURE EVALUATION
Patient: Rai Leija Jr.    Procedure Summary     Date:  10/01/19 Room / Location:  [place] [address] / SURGERY [facility]    Anesthesia Start:  0919 Anesthesia Stop:  1124    Procedures:       CYSTOSCOPY, WITH URETERAL STENT INSERTION (Bilateral Bladder)      URETEROSCOPY (Bilateral Ureter)      LITHOTRIPSY, USING LASER (Bilateral Ureter) Diagnosis:  (CALCULUS OF BILATERAL KIDNEYS)    Surgeon:  Daniel Ko M.D. Responsible Provider:  Chito Perez M.D.    Anesthesia Type:  general ASA Status:  3          Final Anesthesia Type: general  Last vitals  BP   Blood Pressure : 115/59    Temp   36.1 °C (97 °F)    Pulse   Pulse: 61, Heart Rate (Monitored): 60   Resp   13    SpO2   99 %      Anesthesia Post Evaluation    Patient location during evaluation: PACU  Patient participation: complete - patient participated  Level of consciousness: awake and alert  Pain score: 0    Airway patency: patent  Anesthetic complications: no  Cardiovascular status: hemodynamically stable  Respiratory status: acceptable  Hydration status: euvolemic    PONV: none           Nurse Pain Score: 0 (NPRS)

## 2019-10-04 LAB
APPEARANCE STONE: NORMAL
COMPN STONE: NORMAL
NUMBER STONE: NORMAL
SIZE STONE: NORMAL MM
SPECIMEN WT: 217 MG

## 2019-10-26 DIAGNOSIS — E78.1 HYPERTRIGLYCERIDEMIA: ICD-10-CM

## 2019-10-28 RX ORDER — FENOFIBRATE 134 MG/1
CAPSULE ORAL
Qty: 90 CAP | Refills: 0 | Status: SHIPPED | OUTPATIENT
Start: 2019-10-28 | End: 2019-12-19

## 2019-10-29 RX ORDER — GLIMEPIRIDE 2 MG/1
TABLET ORAL
Qty: 180 TAB | Refills: 0 | Status: SHIPPED | OUTPATIENT
Start: 2019-10-29 | End: 2020-07-20 | Stop reason: SDUPTHER

## 2019-11-18 NOTE — TELEPHONE ENCOUNTER
Was the patient seen in the last year in this department? Yes    Does patient have an active prescription for medications requested? No     Received Request Via: Pharmacy    Pt met protocol?: Yes     Last OV 06/19/19    TSH   Date Value Ref Range Status   08/16/2018 3.490 0.380 - 5.330 uIU/mL Final     Comment:     Please note new reference ranges effective 12/14/2017 10:00 AM  Pregnant Females, 1st Trimester  0.050-3.700  Pregnant Females, 2nd Trimester  0.310-4.350  Pregnant Females, 3rd Trimester  0.410-5.180

## 2019-11-19 RX ORDER — LEVOTHYROXINE SODIUM 0.05 MG/1
TABLET ORAL
Qty: 90 TAB | Refills: 0 | Status: SHIPPED | OUTPATIENT
Start: 2019-11-19 | End: 2020-08-19

## 2019-12-11 ENCOUNTER — HOSPITAL ENCOUNTER (OUTPATIENT)
Dept: LAB | Facility: MEDICAL CENTER | Age: 74
End: 2019-12-11
Attending: FAMILY MEDICINE
Payer: MEDICARE

## 2019-12-11 DIAGNOSIS — E03.8 OTHER SPECIFIED HYPOTHYROIDISM: ICD-10-CM

## 2019-12-11 DIAGNOSIS — N18.30 CHRONIC KIDNEY DISEASE (CKD), STAGE III (MODERATE) (HCC): ICD-10-CM

## 2019-12-11 LAB
ALBUMIN SERPL BCP-MCNC: 4.4 G/DL (ref 3.2–4.9)
BUN SERPL-MCNC: 20 MG/DL (ref 8–22)
CALCIUM SERPL-MCNC: 9 MG/DL (ref 8.5–10.5)
CHLORIDE SERPL-SCNC: 110 MMOL/L (ref 96–112)
CO2 SERPL-SCNC: 23 MMOL/L (ref 20–33)
CREAT SERPL-MCNC: 1.61 MG/DL (ref 0.5–1.4)
GLUCOSE SERPL-MCNC: 99 MG/DL (ref 65–99)
PHOSPHATE SERPL-MCNC: 3 MG/DL (ref 2.5–4.5)
POTASSIUM SERPL-SCNC: 4.2 MMOL/L (ref 3.6–5.5)
SODIUM SERPL-SCNC: 141 MMOL/L (ref 135–145)

## 2019-12-11 PROCEDURE — 83036 HEMOGLOBIN GLYCOSYLATED A1C: CPT | Mod: GA

## 2019-12-11 PROCEDURE — 84443 ASSAY THYROID STIM HORMONE: CPT

## 2019-12-11 PROCEDURE — 36415 COLL VENOUS BLD VENIPUNCTURE: CPT

## 2019-12-11 PROCEDURE — 80069 RENAL FUNCTION PANEL: CPT

## 2019-12-12 LAB
EST. AVERAGE GLUCOSE BLD GHB EST-MCNC: 140 MG/DL
HBA1C MFR BLD: 6.5 % (ref 0–5.6)
TSH SERPL DL<=0.005 MIU/L-ACNC: 3.4 UIU/ML (ref 0.38–5.33)

## 2019-12-17 ENCOUNTER — TELEPHONE (OUTPATIENT)
Dept: MEDICAL GROUP | Facility: PHYSICIAN GROUP | Age: 74
End: 2019-12-17

## 2019-12-17 NOTE — TELEPHONE ENCOUNTER
[facility] [place] called wanting verification of the Fenofibrate. Is the patient supposed to take 134mg or 48mg?

## 2019-12-18 NOTE — TELEPHONE ENCOUNTER
Phone Number Called: [phone]    Call outcome: brittani notified    Message: I see the fenofibrate 134 mg was ordered. This is what the pt should be on.   Sid Chiang M.D.

## 2019-12-19 ENCOUNTER — OFFICE VISIT (OUTPATIENT)
Dept: MEDICAL GROUP | Facility: PHYSICIAN GROUP | Age: 74
End: 2019-12-19
Payer: MEDICARE

## 2019-12-19 ENCOUNTER — TELEPHONE (OUTPATIENT)
Dept: MEDICAL GROUP | Facility: PHYSICIAN GROUP | Age: 74
End: 2019-12-19

## 2019-12-19 VITALS
HEIGHT: 70 IN | RESPIRATION RATE: 16 BRPM | DIASTOLIC BLOOD PRESSURE: 60 MMHG | HEART RATE: 68 BPM | TEMPERATURE: 97.6 F | BODY MASS INDEX: 25.05 KG/M2 | OXYGEN SATURATION: 97 % | WEIGHT: 175 LBS | SYSTOLIC BLOOD PRESSURE: 106 MMHG

## 2019-12-19 DIAGNOSIS — E03.8 OTHER SPECIFIED HYPOTHYROIDISM: ICD-10-CM

## 2019-12-19 DIAGNOSIS — Z12.11 COLON CANCER SCREENING: ICD-10-CM

## 2019-12-19 DIAGNOSIS — E11.8 CONTROLLED TYPE 2 DIABETES MELLITUS WITH COMPLICATION, WITHOUT LONG-TERM CURRENT USE OF INSULIN (HCC): ICD-10-CM

## 2019-12-19 DIAGNOSIS — N20.0 RECURRENT NEPHROLITHIASIS: Chronic | ICD-10-CM

## 2019-12-19 DIAGNOSIS — E78.1 HYPERTRIGLYCERIDEMIA: ICD-10-CM

## 2019-12-19 DIAGNOSIS — R19.7 DIARRHEA, UNSPECIFIED TYPE: ICD-10-CM

## 2019-12-19 PROCEDURE — 99214 OFFICE O/P EST MOD 30 MIN: CPT | Performed by: FAMILY MEDICINE

## 2019-12-19 RX ORDER — FENOFIBRATE 48 MG/1
48 TABLET, COATED ORAL DAILY
COMMUNITY
End: 2020-03-13 | Stop reason: SDUPTHER

## 2019-12-19 NOTE — PROGRESS NOTES
Subjective:   Kenny Leija Jr. is a 74 y.o. male here today for evaluation and management of:     Controlled type 2 diabetes mellitus with complication, without long-term current use of insulin ([facility])  A1c 6.5 been off the januvia for 4 months due to cost  LDL 36  GFR stable 42, last year normal urine microalbumin, repeat lab ordered  Meds: asa 81 mg,, simvastatin 40 mg, glimepiride 2 mg, fenofibrate 134mg  Fasting BS are excellent  since he stopped drinking a gallon of milk daily.     Hypertriglyceridemia  Improving on simvastatin 40 mg and fenofibrate 134 mg  Results for KENNY LEIJA JR. (MRN [id number]) as of 12/19/2019 07:54   Ref. Range 6/10/2019 06:46   Cholesterol,Tot Latest Ref Range: 100 - 199 mg/dL 109   Triglycerides Latest Ref Range: 0 - 149 mg/dL 226 (H)   HDL Latest Ref Range: >=40 mg/dL 28 (A)   LDL Latest Ref Range: <100 mg/dL 36       Other specified hypothyroidism  tsh normal on levothyroxine 50 mcg    Recurrent nephrolithiasis  Patient had another recurrence of many kidney stones 12-18 stones on left and right and treated with urostomy b/l in July 2019 with Dr. Ko. He is very happy with his care by Dr. Ko.  He has multiple episodes in the past of kidney stones. He has a follow up in January with the specialist with 24 hr urine and going to have further evaluation and treatment to try to prevent the recurrence.   Encouraged hydration with water as he was drinking a gallon of milk a day.     Diarrhea  Patient has had diarrhea with gas for many years. This was a constant problem significantly bothering him. Even dc of metformin did not help. He recently stopped drinking 1 gallon of mild a day which was his usual intake and notes in the last week that his symptoms were better.   Will observe for next 3 months. If symptoms persist will check stool studies, refer to GI         Current medicines (including changes today)  Current Outpatient Medications   Medication Sig  "Dispense Refill   • fenofibrate (TRICOR) 48 MG Tab Take 48 mg by mouth every day.     • levothyroxine (SYNTHROID) 50 MCG Tab TAKE 1 TABLET BY MOUTH EVERY MORNING ON AN EMPTY STOMACH 90 Tab 0   • glimepiride (AMARYL) 2 MG Tab TAKE 1 TABLET BY MOUTH TWICE DAILY 180 Tab 0   • tamsulosin (FLOMAX) 0.4 MG capsule Take 0.4 mg by mouth every evening.     • simvastatin (ZOCOR) 40 MG Tab TAKE 1 TABLET BY MOUTH EVERY EVENING 90 Tab 3   • aspirin 81 MG tablet Take 1 Tab by mouth every day. 100 Tab 3   • vitamin D (CHOLECALCIFEROL) 1000 UNIT Tab Take 1,000-2,000 Units by mouth 2 Times a Day. 1 tab in the am, 2 tabs in the pm       No current facility-administered medications for this visit.      He  has a past medical history of Diabetes mellitus type II, Elevated BP (9/9/2010), Hearing decreased, High cholesterol, Hyperlipidemia (12/1/2010), Hypertriglyceridemia (12/1/2010), Renal stone, and Transaminitis (4/12/2011).    ROS  No chest pain, no shortness of breath, no abdominal pain       Objective:     /60 (BP Location: Left arm, Patient Position: Sitting, BP Cuff Size: Adult)   Pulse 68   Temp 36.4 °C (97.6 °F) (Temporal)   Resp 16   Ht 1.778 m (5' 10\")   Wt 79.4 kg (175 lb)   SpO2 97%  Body mass index is 25.11 kg/m².   Physical Exam:  Constitutional: Alert, no distress.  Skin: Warm, dry, good turgor, no rashes in visible areas.  Eye: Equal, round and reactive, conjunctiva clear, lids normal.  ENMT: Lips without lesions, good dentition, oropharynx clear.  Neck: Trachea midline, no masses, no thyromegaly. No cervical or supraclavicular lymphadenopathy  Respiratory: Unlabored respiratory effort, lungs clear to auscultation, no wheezes, no ronchi.  Cardiovascular: Normal S1, S2, no murmur, no edema.  Abdomen: Soft, non-tender, no masses, no hepatosplenomegaly.  Psych: Alert and oriented x3, normal affect and mood.        Assessment and Plan:   The following treatment plan was discussed    1. Controlled type 2 diabetes " mellitus with complication, without long-term current use of insulin (HCC)  Controlled. If worsening A1c can add metformin back.   - MICROALBUMIN CREAT RATIO URINE; Future  - HEMOGLOBIN A1C; Future  - Comp Metabolic Panel; Future    2. Hypertriglyceridemia  Improving.     3. Other specified hypothyroidism  Stable.     4. Recurrent nephrolithiasis  Follow up with urology  - VITAMIN D,25 HYDROXY; Future    5. Diarrhea, unspecified type  Improving.    6. Colon cancer screening  - REFERRAL TO GI FOR COLONOSCOPY      Followup: Return in about 3 months (around 3/19/2020) for DM2, renal stones, diarrhea.

## 2019-12-19 NOTE — ASSESSMENT & PLAN NOTE
Patient had another recurrence of many kidney stones 12-18 stones on left and right and treated with urostomy b/l in July 2019 with Dr. Ko. He is very happy with his care by Dr. Ko.  He has multiple episodes in the past of kidney stones. He has a follow up in January with the specialist with 24 hr urine and going to have further evaluation and treatment to try to prevent the recurrence.   Encouraged hydration with water as he was drinking a gallon of milk a day.

## 2019-12-19 NOTE — TELEPHONE ENCOUNTER
Please call pt's pharmacy brittani Lewis: the dose of fenofibrate is actually 48 mg not the 134 mg as I previously said. I saw the patient in clinic today and we verified the dose. Sorry for the confusion. Refill of the 134 mg had been done mistakenly by our pharmacy pool in October. Thank you  Sid Chiang M.D.

## 2019-12-19 NOTE — ASSESSMENT & PLAN NOTE
Improving on simvastatin 40 mg and fenofibrate 134 mg  Results for KENNY STREET JR. (MRN [id number]) as of 12/19/2019 07:54   Ref. Range 6/10/2019 06:46   Cholesterol,Tot Latest Ref Range: 100 - 199 mg/dL 109   Triglycerides Latest Ref Range: 0 - 149 mg/dL 226 (H)   HDL Latest Ref Range: >=40 mg/dL 28 (A)   LDL Latest Ref Range: <100 mg/dL 36

## 2019-12-19 NOTE — ASSESSMENT & PLAN NOTE
Patient has had diarrhea with gas for many years. This was a constant problem significantly bothering him. Even dc of metformin did not help. He recently stopped drinking 1 gallon of mild a day which was his usual intake and notes in the last week that his symptoms were better.   Will observe for next 3 months. If symptoms persist will check stool studies, refer to GI

## 2019-12-25 NOTE — TELEPHONE ENCOUNTER
Patient has recently been seen by PCP within the last 6 months per protocol (12/19). Will refill DM supplies for 12 months.  Lab Results   Component Value Date/Time    HBA1C 6.5 (H) 12/11/2019 06:58 AM      Lab Results   Component Value Date/Time    MALBCRT 10 11/02/2018 07:10 AM    MICROALBUR 1.0 11/02/2018 07:10 AM      Lab Results   Component Value Date/Time    ALKPHOSPHAT 53 11/02/2018 07:11 AM    ASTSGOT 24 11/02/2018 07:11 AM    ALTSGPT 23 11/02/2018 07:11 AM    TBILIRUBIN 0.6 11/02/2018 07:11 AM

## 2019-12-30 ENCOUNTER — HOSPITAL ENCOUNTER (OUTPATIENT)
Facility: MEDICAL CENTER | Age: 74
End: 2019-12-30
Attending: UROLOGY
Payer: MEDICARE

## 2019-12-30 ENCOUNTER — HOSPITAL ENCOUNTER (OUTPATIENT)
Facility: MEDICAL CENTER | Age: 74
End: 2019-12-30
Attending: FAMILY MEDICINE
Payer: MEDICARE

## 2019-12-30 LAB
CREAT 24H UR-MSRATE: 1425 MG/24 HR (ref 1000–2000)
CREAT UR-MCNC: 95 MG/DL
CREAT UR-MCNC: 98.7 MG/DL
MICROALBUMIN UR-MCNC: 2.4 MG/DL
MICROALBUMIN/CREAT UR: 24 MG/G (ref 0–30)
SPECIMEN VOL UR: 1500 ML

## 2019-12-30 PROCEDURE — 82570 ASSAY OF URINE CREATININE: CPT | Mod: 91

## 2019-12-30 PROCEDURE — 83945 ASSAY OF OXALATE: CPT

## 2019-12-30 PROCEDURE — 82131 AMINO ACIDS SINGLE QUANT: CPT

## 2019-12-30 PROCEDURE — 82570 ASSAY OF URINE CREATININE: CPT

## 2019-12-30 PROCEDURE — 84560 ASSAY OF URINE/URIC ACID: CPT

## 2019-12-30 PROCEDURE — 82507 ASSAY OF CITRATE: CPT

## 2019-12-30 PROCEDURE — 81050 URINALYSIS VOLUME MEASURE: CPT

## 2019-12-30 PROCEDURE — 82043 UR ALBUMIN QUANTITATIVE: CPT

## 2019-12-30 PROCEDURE — 82340 ASSAY OF CALCIUM IN URINE: CPT

## 2019-12-30 PROCEDURE — 84300 ASSAY OF URINE SODIUM: CPT

## 2019-12-30 PROCEDURE — 84105 ASSAY OF URINE PHOSPHORUS: CPT

## 2020-01-01 LAB
CALCIUM 24H UR-MCNC: 3.5 MG/DL
CALCIUM 24H UR-MRATE: 52 MG/D
CALCIUM/CREAT 24H UR: 36 MG/G (ref 20–240)
CITRATE 24H UR-MCNC: 74 MG/L
CITRATE 24H UR-MRATE: 111 MG/D (ref 320–1240)
COLLECT DURATION TIME SPEC: 24 HRS
COLLECT DURATION TIME SPEC: 24 HRS
CREAT 24H UR-MCNC: 96 MG/DL
CREAT 24H UR-MRATE: 1440 MG/D (ref 800–2100)
PHOSPHATE 24H UR-MCNC: 102 MG/DL
PHOSPHATE 24H UR-MRATE: 1530 MG/D (ref 400–1300)
PHOSPHATE/CREAT 24H UR: 1062 MG/G
SODIUM 24H UR-SCNC: 112 MMOL/L
SODIUM 24H UR-SRATE: 168 MMOL/D (ref 51–286)
SPECIMEN VOL ?TM UR: 1500 ML
TOTAL VOLUME 1105: 1500 ML
URATE 24H UR-MCNC: 29.2 MG/DL
URATE 24H UR-MRATE: 438 MG/D (ref 250–750)

## 2020-01-03 LAB
COLLECT DURATION TIME SPEC: 24 HRS
COLLECT DURATION TIME SPEC: 24 HRS
CREAT 24H UR-MCNC: 96 MG/DL
CREAT 24H UR-MRATE: 1440 MG/D (ref 800–2100)
CREAT UR-MCNC: 99 MG/DL
CYSTINE 24H UR-MCNC: 1.83 MG/DL
CYSTINE 24H UR-MRATE: 27 MG/D
CYSTINE/CREAT 24H UR-RTO: 77 UMOL/G CRT
OXALATE 24H UR-MCNC: 78 MG/L
OXALATE 24H UR-MRATE: 117 MG/D (ref 16–49)
SPECIMEN VOL ?TM UR: 1500 ML
TOTAL VOLUME 1105: 1500 ML

## 2020-01-04 ENCOUNTER — APPOINTMENT (OUTPATIENT)
Dept: URGENT CARE | Facility: PHYSICIAN GROUP | Age: 75
End: 2020-01-04
Payer: MEDICARE

## 2020-01-04 ENCOUNTER — APPOINTMENT (OUTPATIENT)
Dept: RADIOLOGY | Facility: IMAGING CENTER | Age: 75
End: 2020-01-04
Attending: UROLOGY
Payer: MEDICARE

## 2020-01-04 DIAGNOSIS — N20.0 CALCULUS OF KIDNEY: ICD-10-CM

## 2020-01-04 PROCEDURE — 74018 RADEX ABDOMEN 1 VIEW: CPT | Mod: TC,FY | Performed by: FAMILY MEDICINE

## 2020-03-13 NOTE — TELEPHONE ENCOUNTER
*PT NEEDS TO GET LABS UPDATED*  Was the patient seen in the last year in this department? Yes    Does patient have an active prescription for medications requested? No     Received Request Via: Pharmacy      Pt met protocol?: NO    LAST OV 12/19/2019    Lab Results   Component Value Date/Time    HBA1C 6.5 (H) 12/11/2019 0658     Lab Results   Component Value Date/Time    AVGLUC 140 12/11/2019 0658     Lab Results   Component Value Date/Time    CHOLSTRLTOT 109 06/10/2019 0646     Lab Results   Component Value Date/Time    TRIGLYCERIDE 226 (H) 06/10/2019 0646     No components found for: HLD  Lab Results   Component Value Date/Time    LDL 36 06/10/2019 0646

## 2020-03-16 RX ORDER — FENOFIBRATE 48 MG/1
48 TABLET, COATED ORAL DAILY
Qty: 90 TAB | Refills: 0 | Status: SHIPPED | OUTPATIENT
Start: 2020-03-16 | End: 2020-03-25

## 2020-03-25 DIAGNOSIS — E78.1 HYPERTRIGLYCERIDEMIA: ICD-10-CM

## 2020-03-25 RX ORDER — FENOFIBRATE 134 MG/1
134 CAPSULE ORAL DAILY
Qty: 90 CAP | Refills: 3 | Status: SHIPPED | OUTPATIENT
Start: 2020-03-25 | End: 2020-03-27

## 2020-03-27 RX ORDER — FENOFIBRATE 145 MG/1
145 TABLET, COATED ORAL DAILY
Qty: 90 TAB | Refills: 3 | Status: SHIPPED | OUTPATIENT
Start: 2020-03-27 | End: 2020-04-10

## 2020-03-31 ENCOUNTER — HOSPITAL ENCOUNTER (OUTPATIENT)
Dept: LAB | Facility: MEDICAL CENTER | Age: 75
End: 2020-03-31
Attending: FAMILY MEDICINE
Payer: MEDICARE

## 2020-03-31 ENCOUNTER — HOSPITAL ENCOUNTER (OUTPATIENT)
Dept: LAB | Facility: MEDICAL CENTER | Age: 75
End: 2020-03-31
Attending: INTERNAL MEDICINE
Payer: MEDICARE

## 2020-03-31 DIAGNOSIS — N20.0 RECURRENT NEPHROLITHIASIS: Chronic | ICD-10-CM

## 2020-03-31 DIAGNOSIS — E11.8 CONTROLLED TYPE 2 DIABETES MELLITUS WITH COMPLICATION, WITHOUT LONG-TERM CURRENT USE OF INSULIN (HCC): ICD-10-CM

## 2020-03-31 LAB
25(OH)D3 SERPL-MCNC: 32 NG/ML (ref 30–100)
ALBUMIN SERPL BCP-MCNC: 4.4 G/DL (ref 3.2–4.9)
ALBUMIN SERPL BCP-MCNC: 4.4 G/DL (ref 3.2–4.9)
ALBUMIN/GLOB SERPL: 1.6 G/DL
ALBUMIN/GLOB SERPL: 1.7 G/DL
ALP SERPL-CCNC: 91 U/L (ref 30–99)
ALP SERPL-CCNC: 91 U/L (ref 30–99)
ALT SERPL-CCNC: 30 U/L (ref 2–50)
ALT SERPL-CCNC: 32 U/L (ref 2–50)
ANION GAP SERPL CALC-SCNC: 12 MMOL/L (ref 7–16)
ANION GAP SERPL CALC-SCNC: 12 MMOL/L (ref 7–16)
AST SERPL-CCNC: 27 U/L (ref 12–45)
AST SERPL-CCNC: 27 U/L (ref 12–45)
BASOPHILS # BLD AUTO: 0.9 % (ref 0–1.8)
BASOPHILS # BLD: 0.07 K/UL (ref 0–0.12)
BILIRUB SERPL-MCNC: 0.6 MG/DL (ref 0.1–1.5)
BILIRUB SERPL-MCNC: 0.7 MG/DL (ref 0.1–1.5)
BUN SERPL-MCNC: 14 MG/DL (ref 8–22)
BUN SERPL-MCNC: 14 MG/DL (ref 8–22)
CALCIUM SERPL-MCNC: 9.2 MG/DL (ref 8.5–10.5)
CALCIUM SERPL-MCNC: 9.4 MG/DL (ref 8.5–10.5)
CHLORIDE SERPL-SCNC: 107 MMOL/L (ref 96–112)
CHLORIDE SERPL-SCNC: 108 MMOL/L (ref 96–112)
CO2 SERPL-SCNC: 21 MMOL/L (ref 20–33)
CO2 SERPL-SCNC: 21 MMOL/L (ref 20–33)
CREAT SERPL-MCNC: 1.49 MG/DL (ref 0.5–1.4)
CREAT SERPL-MCNC: 1.52 MG/DL (ref 0.5–1.4)
CREAT UR-MCNC: 79.67 MG/DL
EOSINOPHIL # BLD AUTO: 0.22 K/UL (ref 0–0.51)
EOSINOPHIL NFR BLD: 2.8 % (ref 0–6.9)
ERYTHROCYTE [DISTWIDTH] IN BLOOD BY AUTOMATED COUNT: 42.9 FL (ref 35.9–50)
ERYTHROCYTE [SEDIMENTATION RATE] IN BLOOD BY WESTERGREN METHOD: 1 MM/HOUR (ref 0–20)
EST. AVERAGE GLUCOSE BLD GHB EST-MCNC: 143 MG/DL
GLOBULIN SER CALC-MCNC: 2.6 G/DL (ref 1.9–3.5)
GLOBULIN SER CALC-MCNC: 2.7 G/DL (ref 1.9–3.5)
GLUCOSE SERPL-MCNC: 122 MG/DL (ref 65–99)
GLUCOSE SERPL-MCNC: 122 MG/DL (ref 65–99)
HBA1C MFR BLD: 6.6 % (ref 0–5.6)
HCT VFR BLD AUTO: 42.3 % (ref 42–52)
HGB BLD-MCNC: 14.6 G/DL (ref 14–18)
IMM GRANULOCYTES # BLD AUTO: 0.03 K/UL (ref 0–0.11)
IMM GRANULOCYTES NFR BLD AUTO: 0.4 % (ref 0–0.9)
LYMPHOCYTES # BLD AUTO: 2.35 K/UL (ref 1–4.8)
LYMPHOCYTES NFR BLD: 29.9 % (ref 22–41)
MCH RBC QN AUTO: 30.7 PG (ref 27–33)
MCHC RBC AUTO-ENTMCNC: 34.5 G/DL (ref 33.7–35.3)
MCV RBC AUTO: 89.1 FL (ref 81.4–97.8)
MICROALBUMIN UR-MCNC: <1.2 MG/DL
MICROALBUMIN/CREAT UR: NORMAL MG/G (ref 0–30)
MONOCYTES # BLD AUTO: 0.62 K/UL (ref 0–0.85)
MONOCYTES NFR BLD AUTO: 7.9 % (ref 0–13.4)
NEUTROPHILS # BLD AUTO: 4.58 K/UL (ref 1.82–7.42)
NEUTROPHILS NFR BLD: 58.1 % (ref 44–72)
NRBC # BLD AUTO: 0 K/UL
NRBC BLD-RTO: 0 /100 WBC
PLATELET # BLD AUTO: 162 K/UL (ref 164–446)
PMV BLD AUTO: 10.8 FL (ref 9–12.9)
POTASSIUM SERPL-SCNC: 3.7 MMOL/L (ref 3.6–5.5)
POTASSIUM SERPL-SCNC: 3.7 MMOL/L (ref 3.6–5.5)
PROT SERPL-MCNC: 7 G/DL (ref 6–8.2)
PROT SERPL-MCNC: 7.1 G/DL (ref 6–8.2)
RBC # BLD AUTO: 4.75 M/UL (ref 4.7–6.1)
SODIUM SERPL-SCNC: 140 MMOL/L (ref 135–145)
SODIUM SERPL-SCNC: 141 MMOL/L (ref 135–145)
TSH SERPL DL<=0.005 MIU/L-ACNC: 3.56 UIU/ML (ref 0.38–5.33)
WBC # BLD AUTO: 7.9 K/UL (ref 4.8–10.8)

## 2020-03-31 PROCEDURE — 84443 ASSAY THYROID STIM HORMONE: CPT

## 2020-03-31 PROCEDURE — 82570 ASSAY OF URINE CREATININE: CPT

## 2020-03-31 PROCEDURE — 82043 UR ALBUMIN QUANTITATIVE: CPT

## 2020-03-31 PROCEDURE — 85652 RBC SED RATE AUTOMATED: CPT

## 2020-03-31 PROCEDURE — 80053 COMPREHEN METABOLIC PANEL: CPT

## 2020-03-31 PROCEDURE — 85025 COMPLETE CBC W/AUTO DIFF WBC: CPT

## 2020-03-31 PROCEDURE — 82306 VITAMIN D 25 HYDROXY: CPT | Mod: GA

## 2020-03-31 PROCEDURE — 36415 COLL VENOUS BLD VENIPUNCTURE: CPT

## 2020-03-31 PROCEDURE — 83036 HEMOGLOBIN GLYCOSYLATED A1C: CPT | Mod: GA

## 2020-03-31 PROCEDURE — 80053 COMPREHEN METABOLIC PANEL: CPT | Mod: 91

## 2020-04-01 NOTE — RESULT ENCOUNTER NOTE
Rai,  Your labs show your A1c for diabetes and your kidney function are stable and good! Your vitamin d is back to normal. Good labs results!  Sid Chiang M.D.

## 2020-04-10 ENCOUNTER — TELEMEDICINE (OUTPATIENT)
Dept: MEDICAL GROUP | Facility: PHYSICIAN GROUP | Age: 75
End: 2020-04-10
Payer: MEDICARE

## 2020-04-10 DIAGNOSIS — E11.8 CONTROLLED TYPE 2 DIABETES MELLITUS WITH COMPLICATION, WITHOUT LONG-TERM CURRENT USE OF INSULIN (HCC): ICD-10-CM

## 2020-04-10 DIAGNOSIS — E03.8 OTHER SPECIFIED HYPOTHYROIDISM: ICD-10-CM

## 2020-04-10 DIAGNOSIS — E78.1 HYPERTRIGLYCERIDEMIA: ICD-10-CM

## 2020-04-10 PROCEDURE — 99214 OFFICE O/P EST MOD 30 MIN: CPT | Mod: 95,CR | Performed by: FAMILY MEDICINE

## 2020-04-10 RX ORDER — FENOFIBRATE 48 MG/1
48 TABLET, COATED ORAL DAILY
Qty: 90 TAB | Refills: 3 | Status: SHIPPED | OUTPATIENT
Start: 2020-04-10 | End: 2021-04-06

## 2020-04-10 NOTE — PROGRESS NOTES
Telemedicine Visit: Established Patient     This encounter was conducted via Zoom .   Verbal consent was obtained. Patient's identity was verified.    Subjective:   CC: review labs.   Kenny Leija Jr. is a 75 y.o. male presenting for evaluation and management of:    Controlled type 2 diabetes mellitus with complication, without long-term current use of insulin (HCC)  Well controlled A1c 6.6  Patient checks blood sugars once a day.   He takes glimepiride 2 mg twice a day, simvastatin 40mg and fenofibrate 48 mg. Needed a refill on the fenofibrate which was provided to [facility] pharmacy which he requested.   GFR stable in 40s, encouraged him to stay well hydrated.   LDL 36 and normal urine microalbumin  Repeat labs ordered for 6 months.     Hypertriglyceridemia  In the past as high as 1100  Currently in 200s  He takes simvastatin 40mg and fenofibrate 48 mg. Needed a refill on the fenofibrate which was provided to [facility] pharmacy which he requested.   Results for KENNY LEIJA JR. (MRN [id number]) as of 4/10/2020 08:11   Ref. Range 6/24/2016 06:25 4/10/2017 06:10 8/14/2017 06:30 2/15/2018 06:10 6/10/2019 06:46   Cholesterol,Tot Latest Ref Range: 100 - 199 mg/dL 93 (L) 264 (H) 122 106 109   Triglycerides Latest Ref Range: 0 - 149 mg/dL 209 (H) 1,135 (H) 289 (H) 232 (H) 226 (H)   HDL Latest Ref Range: >=40 mg/dL 32 (A) see below 28 (A) 28 (A) 28 (A)   LDL Latest Ref Range: <100 mg/dL 19 see below 36 32 36       Other specified hypothyroidism  Normal TSH on levothyroxine 50 mcg.   Has no abnormal weight loss/gain.   No skin changes reported.       ROS   Denies any recent fevers or chills. No nausea or vomiting. No chest pains or shortness of breath. He reports some sinus congestion but no nasal drainage.     Allergies   Allergen Reactions   • Ace Inhibitors Cough     Cough     • Metformin Diarrhea     Diarrhea       Current medicines (including changes today)  Current Outpatient Medications   Medication Sig  Dispense Refill   • fenofibrate (TRICOR) 48 MG Tab Take 1 Tab by mouth every day. 90 Tab 3   • CONTOUR NEXT TEST strip USE ONCE A DAY AS NEEDED FOR HIGH OR LOW SUGAR 100 Strip 3   • levothyroxine (SYNTHROID) 50 MCG Tab TAKE 1 TABLET BY MOUTH EVERY MORNING ON AN EMPTY STOMACH 90 Tab 0   • glimepiride (AMARYL) 2 MG Tab TAKE 1 TABLET BY MOUTH TWICE DAILY 180 Tab 0   • tamsulosin (FLOMAX) 0.4 MG capsule Take 0.4 mg by mouth every evening.     • simvastatin (ZOCOR) 40 MG Tab TAKE 1 TABLET BY MOUTH EVERY EVENING 90 Tab 3   • aspirin 81 MG tablet Take 1 Tab by mouth every day. 100 Tab 3   • vitamin D (CHOLECALCIFEROL) 1000 UNIT Tab Take 1,000-2,000 Units by mouth 2 Times a Day. 1 tab in the am, 2 tabs in the pm       No current facility-administered medications for this visit.        Patient Active Problem List    Diagnosis Date Noted   • Bilateral hand pain 02/22/2018   • Other specified hypothyroidism 02/22/2018   • Bilateral hearing loss 08/17/2017   • Diarrhea 04/13/2017   • Chronic kidney disease (CKD), stage III (moderate) ([facility]) 10/06/2016   • Elevated TSH 10/06/2016   • Recurrent nephrolithiasis 05/13/2015   • Vitamin D deficiency disease 08/28/2014   • Fatty liver 05/25/2011   • Hyperlipidemia 12/01/2010   • Hypertriglyceridemia 12/01/2010   • Controlled type 2 diabetes mellitus with complication, without long-term current use of insulin ([facility]) 12/01/2010       Family History   Problem Relation Age of Onset   • GI Disease Father         polyps   • No Known Problems Mother        He  has a past medical history of Diabetes mellitus type II, Elevated BP (9/9/2010), Hearing decreased, High cholesterol, Hyperlipidemia (12/1/2010), Hypertriglyceridemia (12/1/2010), Renal stone, and Transaminitis (4/12/2011).  He  has a past surgical history that includes tonsillectomy; lithotripsy (2015,2017, 2019); other (Left); pr cystoscopy,insert ureteral stent (Bilateral, 10/1/2019); ureteroscopy (Bilateral, 10/1/2019); and  "lasertripsy (Bilateral, 10/1/2019).       Objective:   Vitals obtained by patient:  Height: 5' 10\"  Weight: 170 lbs  RR observed 12    Physical Exam:  Constitutional: Alert, no distress, well-groomed.  Skin: No rashes in visible areas.  Eye: Round. Conjunctiva clear, lids normal. No icterus.   ENMT: Lips pink without lesions, good dentition, moist mucous membranes. Phonation normal.  Neck: No masses, no thyromegaly. Moves freely without pain.  CV: Pulse as reported by patient  Respiratory: Unlabored respiratory effort, no cough or audible wheeze  Psych: Alert and oriented x3, normal affect and mood.       Assessment and Plan:   The following treatment plan was discussed:     1. Hypertriglyceridemia  - Lipid Profile; Future    2. Controlled type 2 diabetes mellitus with complication, without long-term current use of insulin (HCC)  - Renal Function Panel; Future  - HEMOGLOBIN A1C; Future    3. Other specified hypothyroidism    Other orders  - fenofibrate (TRICOR) 48 MG Tab; Take 1 Tab by mouth every day.  Dispense: 90 Tab; Refill: 3        Follow-up: Patient will call for 6 month follow up visit in clinic with labs a week before visit.          "

## 2020-04-10 NOTE — ASSESSMENT & PLAN NOTE
Normal TSH on levothyroxine 50 mcg.   Has no abnormal weight loss/gain.   No skin changes reported.

## 2020-04-10 NOTE — ASSESSMENT & PLAN NOTE
In the past as high as 1100  Currently in 200s  He takes simvastatin 40mg and fenofibrate 48 mg. Needed a refill on the fenofibrate which was provided to [facility] pharmacy which he requested.   Results for KENNY STREET JR. (MRN [id number]) as of 4/10/2020 08:11   Ref. Range 6/24/2016 06:25 4/10/2017 06:10 8/14/2017 06:30 2/15/2018 06:10 6/10/2019 06:46   Cholesterol,Tot Latest Ref Range: 100 - 199 mg/dL 93 (L) 264 (H) 122 106 109   Triglycerides Latest Ref Range: 0 - 149 mg/dL 209 (H) 1,135 (H) 289 (H) 232 (H) 226 (H)   HDL Latest Ref Range: >=40 mg/dL 32 (A) see below 28 (A) 28 (A) 28 (A)   LDL Latest Ref Range: <100 mg/dL 19 see below 36 32 36

## 2020-04-10 NOTE — ASSESSMENT & PLAN NOTE
Well controlled A1c 6.6  Patient checks blood sugars once a day.   He takes glimepiride 2 mg twice a day, simvastatin 40mg and fenofibrate 48 mg. Needed a refill on the fenofibrate which was provided to [facility] which he requested.   GFR stable in 40s, encouraged him to stay well hydrated.   LDL 36 and normal urine microalbumin  Repeat labs ordered for 6 months.

## 2020-06-17 DIAGNOSIS — M79.641 BILATERAL HAND PAIN: ICD-10-CM

## 2020-06-17 DIAGNOSIS — M79.642 BILATERAL HAND PAIN: ICD-10-CM

## 2020-07-01 DIAGNOSIS — M79.642 BILATERAL HAND PAIN: ICD-10-CM

## 2020-07-01 DIAGNOSIS — M79.641 BILATERAL HAND PAIN: ICD-10-CM

## 2020-07-08 ENCOUNTER — APPOINTMENT (OUTPATIENT)
Dept: URGENT CARE | Facility: PHYSICIAN GROUP | Age: 75
End: 2020-07-08
Payer: MEDICARE

## 2020-07-08 ENCOUNTER — APPOINTMENT (OUTPATIENT)
Dept: RADIOLOGY | Facility: IMAGING CENTER | Age: 75
End: 2020-07-08
Attending: NURSE PRACTITIONER
Payer: MEDICARE

## 2020-07-08 ENCOUNTER — APPOINTMENT (OUTPATIENT)
Dept: RADIOLOGY | Facility: IMAGING CENTER | Age: 75
End: 2020-07-08
Attending: UROLOGY
Payer: MEDICARE

## 2020-07-08 DIAGNOSIS — M79.642 BILATERAL HAND PAIN: ICD-10-CM

## 2020-07-08 DIAGNOSIS — N20.0 CALCULUS OF KIDNEY: ICD-10-CM

## 2020-07-08 DIAGNOSIS — M79.641 BILATERAL HAND PAIN: ICD-10-CM

## 2020-07-08 PROCEDURE — 74018 RADEX ABDOMEN 1 VIEW: CPT | Mod: TC,FY | Performed by: PHYSICIAN ASSISTANT

## 2020-07-08 PROCEDURE — 77077 JOINT SURVEY SINGLE VIEW: CPT | Mod: TC,FY | Performed by: NURSE PRACTITIONER

## 2020-07-20 RX ORDER — GLIMEPIRIDE 2 MG/1
TABLET ORAL
Qty: 180 TAB | Refills: 3 | Status: SHIPPED | OUTPATIENT
Start: 2020-07-20 | End: 2020-12-01

## 2020-07-27 NOTE — PROGRESS NOTES
New patient note    Physiatry (physical medicine and  Rehabilitation), interventional spine and sports medicine    Date of service: See epic    Chief complaint:   Chief Complaint   Patient presents with   • New Patient     Hand pain        Referring provider: Brigitte Ramirez A.P.*     HISTORY    HPI: Rai Jhony Chamberlaineliseo Saucedo. 75 y.o. 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       The patient works as a .  For many years he has been having worsening of bilateral hand pain.  His hand pain is mostly in the bilateral thumbs.  He also has severe pain in the ring finger and middle finger of the right hand with decreased range of motion of these fingers.  This is been chronic and progressively worsening.  He rates the pain overall is 7 out of 10 in intensity.  He denies any numbness or weakness.  He does have difficulty opening jars.  He does not wake up overnight with any burning sensation on the palmar aspect of the hands.  Denies neck pain or arm pains.       Medical records review:  I reviewed the note from the referring provider Brigitte Ramirez A.P.* including the note dated 6/17/2020 with hand x-rays ordered.     I reviewed the notes and the patient's PCP from 4/10/2020 and with hypertriglyceridemia with labs ordered, controlled type 2 diabetes with labs ordered,    ROS:   Red Flags ROS:   Fever, Chills, Sweats: Denies  Involuntary Weight Loss: Denies  Bladder Incontinence: Denies  Bowel Incontinence: denies  Saddle Anesthesia: Denies    All other systems reviewed and negative.       PMHx:   Past Medical History:   Diagnosis Date   • Diabetes mellitus type II     oral medications   • Elevated BP 9/9/2010   • Hearing decreased     seeing ENT   • High cholesterol    • Hyperlipidemia  12/1/2010   • Hypertriglyceridemia 12/1/2010   • Renal stone     sees Urology   • Transaminitis 4/12/2011         Current Outpatient Medications on File Prior to Visit   Medication Sig Dispense Refill   • hydroCHLOROthiazide (HYDRODIURIL) 25 MG Tab      • glimepiride (AMARYL) 2 MG Tab TAKE 1 TABLET BY MOUTH TWICE DAILY 180 Tab 3   • fenofibrate (TRICOR) 48 MG Tab Take 1 Tab by mouth every day. 90 Tab 3   • CONTOUR NEXT TEST strip USE ONCE A DAY AS NEEDED FOR HIGH OR LOW SUGAR 100 Strip 3   • levothyroxine (SYNTHROID) 50 MCG Tab TAKE 1 TABLET BY MOUTH EVERY MORNING ON AN EMPTY STOMACH 90 Tab 0   • tamsulosin (FLOMAX) 0.4 MG capsule Take 0.4 mg by mouth every evening.     • simvastatin (ZOCOR) 40 MG Tab TAKE 1 TABLET BY MOUTH EVERY EVENING 90 Tab 3   • aspirin 81 MG tablet Take 1 Tab by mouth every day. 100 Tab 3   • vitamin D (CHOLECALCIFEROL) 1000 UNIT Tab Take 1,000-2,000 Units by mouth 2 Times a Day. 1 tab in the am, 2 tabs in the pm       No current facility-administered medications on file prior to visit.         PSHx:   Past Surgical History:   Procedure Laterality Date   • PB CYSTOSCOPY,INSERT URETERAL STENT Bilateral 10/1/2019    Procedure: CYSTOSCOPY, WITH URETERAL STENT INSERTION;  Surgeon: Daniel Ko M.D.;  Location: [facility];  Service: Urology   • URETEROSCOPY Bilateral 10/1/2019    Procedure: URETEROSCOPY;  Surgeon: Daniel Ko M.D.;  Location: [facility];  Service: Urology   • LASERTRIPSY Bilateral 10/1/2019    Procedure: LITHOTRIPSY, USING LASER;  Surgeon: Daniel Ko M.D.;  Location: SURGERY [facility];  Service: Urology   • LITHOTRIPSY  2015,2017, 2019    renal stone removal  x 3   • OTHER Left     pinky finger, windield glass removal   • TONSILLECTOMY         Family history   Family History   Problem Relation Age of Onset   • GI Disease Father         polyps   • No Known Problems Mother          Medications: reviewed on epic.    Outpatient Medications Marked as Taking for the 20 encounter (Office Visit) with Stan Orourke M.D.   Medication Sig Dispense Refill   • hydroCHLOROthiazide (HYDRODIURIL) 25 MG Tab      • Diclofenac Sodium 1 % Gel Apply 2 g to skin as directed 4 times a day as needed (pain). 150 g 3   • glimepiride (AMARYL) 2 MG Tab TAKE 1 TABLET BY MOUTH TWICE DAILY 180 Tab 3   • fenofibrate (TRICOR) 48 MG Tab Take 1 Tab by mouth every day. 90 Tab 3   • CONTOUR NEXT TEST strip USE ONCE A DAY AS NEEDED FOR HIGH OR LOW SUGAR 100 Strip 3   • levothyroxine (SYNTHROID) 50 MCG Tab TAKE 1 TABLET BY MOUTH EVERY MORNING ON AN EMPTY STOMACH 90 Tab 0   • tamsulosin (FLOMAX) 0.4 MG capsule Take 0.4 mg by mouth every evening.     • simvastatin (ZOCOR) 40 MG Tab TAKE 1 TABLET BY MOUTH EVERY EVENING 90 Tab 3   • aspirin 81 MG tablet Take 1 Tab by mouth every day. 100 Tab 3   • vitamin D (CHOLECALCIFEROL) 1000 UNIT Tab Take 1,000-2,000 Units by mouth 2 Times a Day. 1 tab in the am, 2 tabs in the pm          Allergies:   Allergies   Allergen Reactions   • Ace Inhibitors Cough     Cough     • Metformin Diarrhea     Diarrhea       Social Hx:   Social History     Socioeconomic History   • Marital status:      Spouse name: Not on file   • Number of children: Not on file   • Years of education: Not on file   • Highest education level: Not on file   Occupational History   • Occupation: Retired     Employer: OTHER   Social Needs   • Financial resource strain: Not on file   • Food insecurity     Worry: Not on file     Inability: Not on file   • Transportation needs     Medical: Not on file     Non-medical: Not on file   Tobacco Use   • Smoking status: Former Smoker     Packs/day: 1.00     Years: 20.00     Pack years: 20.00     Types: Cigarettes     Last attempt to quit: 10/26/1986     Years since quittin.7   • Smokeless tobacco: Never Used   Substance and Sexual Activity   • Alcohol use: Not Currently     Alcohol/week: 0.0 oz      "Frequency: Never     Binge frequency: Never     Comment: a few a year   • Drug use: No   • Sexual activity: Not Currently     Partners: Female   Lifestyle   • Physical activity     Days per week: Not on file     Minutes per session: Not on file   • Stress: Not on file   Relationships   • Social connections     Talks on phone: Not on file     Gets together: Not on file     Attends [employer] service: Not on file     Active member of club or organization: Not on file     Attends meetings of clubs or organizations: Not on file     Relationship status: Not on file   • Intimate partner violence     Fear of current or ex partner: Not on file     Emotionally abused: Not on file     Physically abused: Not on file     Forced sexual activity: Not on file   Other Topics Concern   •  Service No   • Blood Transfusions No   • Caffeine Concern No   • Occupational Exposure No   • Hobby Hazards No   • Sleep Concern No   • Stress Concern No   • Weight Concern No   • Special Diet No   • Back Care No   • Exercise Yes   • Bike Helmet No   • Seat Belt Yes   • Self-Exams No   Social History Narrative    - no children         EXAMINATION     Physical Exam:   Vitals: /70 (BP Location: Left arm, Patient Position: Sitting, BP Cuff Size: Adult)   Pulse 65   Temp 36 °C (96.8 °F) (Temporal)   Ht 1.74 m (5' 8.5\")   Wt 76.1 kg (167 lb 12.3 oz)   SpO2 96%     Constitutional:   Body Habitus: Body mass index is 25.14 kg/m².  Cooperation: Fully cooperates with exam  Appearance: Well-groomed, well-nourished, not disheveled     Eyes: No scleral icterus to suggest severe liver disease, no proptosis to suggest severe hyperthyroid    ENT -no obvious auditory deficits, no obvious tongue lesions, tongue midline, no facial droop     Skin -no rashes or lesions noted     Respiratory-  breathing comfortable on room air, no audible wheezing    Cardiovascular- capillary refills less than 2 seconds. No lower extremity edema is noted. "     Gastrointestinal - no obvious abdominal masses, No tenderness to palpation in the abdomen    Psychiatric- alert and oriented ×3. Normal affect.     Gait - normal gait, no use of ambulatory device, nonantalgic.   Musculoskeletal and Neuro -   Bilateral hands:   Inspection: Arthritis multiple joints.  No swelling,  Deformities or rashes. Symmetric appearing thenar and hyperthenar regions bilaterally.  Palpation no significant tenderness to palpation throughout the bilateral hands  Decreased range of motion in the right middle and ring fingers.  The remainder of range of motion is within normal limits throughout bilateral hands, fingers and wrist.  Special tests:  Carpal tunnel compression: Negative bilaterally  Phalen's test: Negative bilaterally  Finkelstein's test: Negative bilaterally     Cervical spine   Inspection: No deformities of the skin over the cervical spine. No rashes or lesions.    full   active range of motion in all directions, without  pain      Spurling’s sign: negative bilaterally    No signs of muscular atrophy in bilateral upper extremities     No tenderness to palpation of the cervical facets        Key points for the international standards for neurological classification of spinal cord injury (ISNCSCI) to light touch.     Dermatome R L   C4 2 2   C5 2 2   C6 2 2   C7 2 2   C8 2 2   T1 2 2   T2 2 2                                     Motor Exam Upper Extremities   ? Myotome R L   Shoulder flexion C5 5 5   Elbow flexion C5 5 5   Wrist extension C6 5 5   Elbow extension C7 5 5   Finger flexion C8 5 5   Finger abduction T1 5 5     Reflexes  ?  R L   Biceps  2+ 2+   Brachioradialis  2+ 2+     Urrutia’s sign negative bilaterally              MEDICAL DECISION MAKING    Medical records review: see under HPI section.     DATA    Labs:   Lab Results   Component Value Date/Time    SODIUM 141 03/31/2020 07:03 AM    POTASSIUM 3.7 03/31/2020 07:03 AM    CHLORIDE 108 03/31/2020 07:03 AM    CO2 21  03/31/2020 07:03 AM    ANION 12.0 03/31/2020 07:03 AM    GLUCOSE 122 (H) 03/31/2020 07:03 AM    BUN 14 03/31/2020 07:03 AM    CREATININE 1.52 (H) 03/31/2020 07:03 AM    CALCIUM 9.2 03/31/2020 07:03 AM    ASTSGOT 27 03/31/2020 07:03 AM    ALTSGPT 30 03/31/2020 07:03 AM    TBILIRUBIN 0.6 03/31/2020 07:03 AM    ALBUMIN 4.4 03/31/2020 07:03 AM    TOTPROTEIN 7.0 03/31/2020 07:03 AM    GLOBULIN 2.6 03/31/2020 07:03 AM    AGRATIO 1.7 03/31/2020 07:03 AM   ]    No results found for: PROTHROMBTM, INR     Lab Results   Component Value Date/Time    WBC 7.9 03/31/2020 07:03 AM    RBC 4.75 03/31/2020 07:03 AM    HEMOGLOBIN 14.6 03/31/2020 07:03 AM    HEMATOCRIT 42.3 03/31/2020 07:03 AM    MCV 89.1 03/31/2020 07:03 AM    MCH 30.7 03/31/2020 07:03 AM    MCHC 34.5 03/31/2020 07:03 AM    MPV 10.8 03/31/2020 07:03 AM    NEUTSPOLYS 58.10 03/31/2020 07:03 AM    LYMPHOCYTES 29.90 03/31/2020 07:03 AM    MONOCYTES 7.90 03/31/2020 07:03 AM    EOSINOPHILS 2.80 03/31/2020 07:03 AM    BASOPHILS 0.90 03/31/2020 07:03 AM        Lab Results   Component Value Date/Time    HBA1C 6.6 (H) 03/31/2020 07:02 AM        Imaging:   I personally reviewed following images, these are my reads  X-ray bilateral hands 7/8/2020  There are degenerative changes in the bilateral hands.  This is worst in the interphalangeal joint of the bilateral thumbs.  Also with arthritis in the DIP of the bilateral long fingers, right pointer finger.  Arthritis of the proximal interphalangeal joint of the right ring finger.    IMAGING radiology reads. I reviewed the following radiology reads            X-ray bilateral hands 7/8/2020                                                                                           IMPRESSION:        1.  Mild osteoarthritic appearing degenerative change of the IP joints in each hand.     2.  No acute hand fracture.     3.  No evidence of erosive arthritic change.                                                                                                    Diagnosis   Visit Diagnoses     ICD-10-CM   1. Chronic hand pain, unspecified laterality  M79.643    G89.29   2. Hand arthritis  M19.049   3. 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 Degenerative arthritis of interphalangeal joint of right thumb  M15.8           ASSESSMENT AND PLAN:  Rai Leija  75 y.o. male      Rai was seen today for new patient.    Diagnoses and all orders for this visit:    Chronic hand pain, unspecified laterality  -     Diclofenac Sodium 1 % Gel; Apply 2 g to skin as directed 4 times a day as needed (pain).  -     REFERRAL TO HAND SURGERY    Hand arthritis  -     Diclofenac Sodium 1 % Gel; Apply 2 g to skin as directed 4 times a day as needed (pain).  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  -     REFERRAL TO HAND SURGERY    Degenerative arthritis of interphalangeal joint of right thumb  -     Diclofenac Sodium 1 % Gel; Apply 2 g to skin as directed 4 times a day as needed (pain).  -     REFERRAL TO HAND SURGERY          The patient has no signs of carpal tunnel syndrome, nerve entrapment or cervical pathology.    I believe the majority of his pain is coming from his arthritis of the bilateral hands and he works as a  so his hands are quite important to his livelihood.    He is failed conservative treatments with multiple medications.    I referred the patient to hand surgery for consultation with Dr. Abdalla        Follow-up: PRN with me          Please note that this dictation was created using voice recognition software. I have made every reasonable attempt to correct obvious errors but there may be errors of grammar and content that I may have overlooked prior to finalization of this note.      Stan Orourke MD  Physical Medicine and Rehabilitation  Interventional  Spine and Sports Physiatry  [facility]           Brigitte Jenkins A.P.*   CC Sid Chiang M.D.

## 2020-07-29 ENCOUNTER — OFFICE VISIT (OUTPATIENT)
Dept: PHYSICAL MEDICINE AND REHAB | Facility: MEDICAL CENTER | Age: 75
End: 2020-07-29
Payer: MEDICARE

## 2020-07-29 VITALS
SYSTOLIC BLOOD PRESSURE: 128 MMHG | HEART RATE: 65 BPM | HEIGHT: 69 IN | OXYGEN SATURATION: 96 % | DIASTOLIC BLOOD PRESSURE: 70 MMHG | TEMPERATURE: 96.8 F | WEIGHT: 167.77 LBS | BODY MASS INDEX: 24.85 KG/M2

## 2020-07-29 DIAGNOSIS — M19.049 HAND ARTHRITIS: ICD-10-CM

## 2020-07-29 DIAGNOSIS — G89.29 CHRONIC HAND PAIN, UNSPECIFIED LATERALITY: ICD-10-CM

## 2020-07-29 DIAGNOSIS — M15.8 DEGENERATIVE ARTHRITIS OF INTERPHALANGEAL JOINT OF RIGHT THUMB: ICD-10-CM

## 2020-07-29 DIAGNOSIS — M15.8 DEGENERATIVE ARTHRITIS OF INTERPHALANGEAL JOINT OF LEFT THUMB: ICD-10-CM

## 2020-07-29 DIAGNOSIS — M79.643 CHRONIC HAND PAIN, UNSPECIFIED LATERALITY: ICD-10-CM

## 2020-07-29 PROCEDURE — 99204 OFFICE O/P NEW MOD 45 MIN: CPT | Performed by: PHYSICAL MEDICINE & REHABILITATION

## 2020-07-29 RX ORDER — HYDROCHLOROTHIAZIDE 25 MG/1
TABLET ORAL
COMMUNITY
Start: 2020-07-20 | End: 2021-09-10

## 2020-07-29 ASSESSMENT — PAIN SCALES - GENERAL: PAINLEVEL: 7=MODERATE-SEVERE PAIN

## 2020-07-29 ASSESSMENT — PATIENT HEALTH QUESTIONNAIRE - PHQ9: CLINICAL INTERPRETATION OF PHQ2 SCORE: 0

## 2020-07-29 ASSESSMENT — FIBROSIS 4 INDEX: FIB4 SCORE: 2.34

## 2020-07-29 NOTE — Clinical Note
Dear Sid Chiang M.D. ,     Thank you for the referral of Rai Leija Jr..      Please see my note for more details       Should you have any questions or concerns please do not hesitate to contact me.    Stan Orourke M.D.

## 2020-07-29 NOTE — Clinical Note
Dear YORDAN Crespo,     Thank you for the referral of Raikarey Leija Jr..      Please see my note for more details       Should you have any questions or concerns please do not hesitate to contact me.    Stan Orourke M.D.

## 2020-07-31 DIAGNOSIS — E78.01 FAMILIAL HYPERCHOLESTEROLEMIA: ICD-10-CM

## 2020-07-31 RX ORDER — SIMVASTATIN 40 MG
TABLET ORAL
Qty: 90 TAB | Refills: 0 | Status: SHIPPED | OUTPATIENT
Start: 2020-07-31 | End: 2020-11-10

## 2020-07-31 NOTE — TELEPHONE ENCOUNTER
Lab Results   Component Value Date/Time    CHOLSTRLTOT 109 06/10/2019 06:46 AM    LDL 36 06/10/2019 06:46 AM    HDL 28 (A) 06/10/2019 06:46 AM    TRIGLYCERIDE 226 (H) 06/10/2019 06:46 AM       Lab Results   Component Value Date/Time    SODIUM 141 03/31/2020 07:03 AM    POTASSIUM 3.7 03/31/2020 07:03 AM    CHLORIDE 108 03/31/2020 07:03 AM    CO2 21 03/31/2020 07:03 AM    GLUCOSE 122 (H) 03/31/2020 07:03 AM    BUN 14 03/31/2020 07:03 AM    CREATININE 1.52 (H) 03/31/2020 07:03 AM     Lab Results   Component Value Date/Time    ALKPHOSPHAT 91 03/31/2020 07:03 AM    ASTSGOT 27 03/31/2020 07:03 AM    ALTSGPT 30 03/31/2020 07:03 AM    TBILIRUBIN 0.6 03/31/2020 07:03 AM      Last seen by PCP 4/10/2020. Will send 3 month(s) to the pharmacy.  Please remind pt to get labs done

## 2020-08-19 RX ORDER — LEVOTHYROXINE SODIUM 0.05 MG/1
TABLET ORAL
Qty: 90 TAB | Refills: 3 | Status: SHIPPED | OUTPATIENT
Start: 2020-08-19 | End: 2021-08-16 | Stop reason: SDUPTHER

## 2020-10-02 ENCOUNTER — OFFICE VISIT (OUTPATIENT)
Dept: MEDICAL GROUP | Facility: PHYSICIAN GROUP | Age: 75
End: 2020-10-02
Payer: MEDICARE

## 2020-10-02 VITALS
WEIGHT: 160 LBS | TEMPERATURE: 97.9 F | OXYGEN SATURATION: 96 % | BODY MASS INDEX: 23.7 KG/M2 | SYSTOLIC BLOOD PRESSURE: 120 MMHG | DIASTOLIC BLOOD PRESSURE: 62 MMHG | HEIGHT: 69 IN | RESPIRATION RATE: 16 BRPM | HEART RATE: 70 BPM

## 2020-10-02 DIAGNOSIS — E03.8 OTHER SPECIFIED HYPOTHYROIDISM: ICD-10-CM

## 2020-10-02 DIAGNOSIS — Z12.5 SCREENING PSA (PROSTATE SPECIFIC ANTIGEN): ICD-10-CM

## 2020-10-02 DIAGNOSIS — E78.2 MIXED HYPERLIPIDEMIA: ICD-10-CM

## 2020-10-02 DIAGNOSIS — E11.8 CONTROLLED TYPE 2 DIABETES MELLITUS WITH COMPLICATION, WITHOUT LONG-TERM CURRENT USE OF INSULIN (HCC): ICD-10-CM

## 2020-10-02 DIAGNOSIS — N18.32 STAGE 3B CHRONIC KIDNEY DISEASE: ICD-10-CM

## 2020-10-02 PROBLEM — N40.0 BENIGN PROSTATIC HYPERPLASIA: Status: ACTIVE | Noted: 2020-10-02

## 2020-10-02 PROBLEM — R19.7 DIARRHEA: Status: RESOLVED | Noted: 2017-04-13 | Resolved: 2020-10-02

## 2020-10-02 PROCEDURE — 99214 OFFICE O/P EST MOD 30 MIN: CPT | Performed by: FAMILY MEDICINE

## 2020-10-02 ASSESSMENT — FIBROSIS 4 INDEX: FIB4 SCORE: 2.34

## 2020-10-02 NOTE — ASSESSMENT & PLAN NOTE
Is a chronic health problem for this patient where his kidney function has been dropping over the last year.  His most recent blood work 6 months ago shows that he is at stage IIIb.  I would like to recheck his labs and see whether or not his kidney function is improving.

## 2020-10-02 NOTE — ASSESSMENT & PLAN NOTE
This is a chronic health problem for this patient for which he is on simvastatin 40 mg daily.  Would like to check his blood work and see him back in 4 weeks to make certain that this is adequately controlled on that dose with no liver problems.

## 2020-10-02 NOTE — ASSESSMENT & PLAN NOTE
This is a chronic health problem that is well controlled with current medications and lifestyle measures. His A1c was down to 6.6 in 3/2020.  We will recheck labs and see him back in 1 months.

## 2020-10-02 NOTE — PROGRESS NOTES
CC:Diagnoses of Stage 3b chronic kidney disease, Controlled type 2 diabetes mellitus with complication, without long-term current use of insulin (HCC), Mixed hyperlipidemia, Screening PSA (prostate specific antigen), and Other specified hypothyroidism were pertinent to this visit.    HISTORY OF PRESENT ILLNESS: Patient is a 75 y.o. male established patient who previously saw Dr. Chiang, presents today to establish care.  I care for his wife and they can come to their appointments together.  He is overdue for blood work it has been over 6 months.  We will get some blood work drawn and then see him back in short follow-up to go over those results.      Chronic kidney disease (CKD), stage III (moderate)  Is a chronic health problem for this patient where his kidney function has been dropping over the last year.  His most recent blood work 6 months ago shows that he is at stage IIIb.  I would like to recheck his labs and see whether or not his kidney function is improving.    Controlled type 2 diabetes mellitus with complication, without long-term current use of insulin (HCC)  This is a chronic health problem that is well controlled with current medications and lifestyle measures. His A1c was down to 6.6 in 3/2020.  We will recheck labs and see him back in 1 months.    Mixed hyperlipidemia  This is a chronic health problem for this patient for which he is on simvastatin 40 mg daily.  Would like to check his blood work and see him back in 4 weeks to make certain that this is adequately controlled on that dose with no liver problems.    Other specified hypothyroidism  This is a chronic health problem for this patient for which she is on levothyroxine 50 mcg daily.  He did have a TSH back in March that came back in the normal range at 3.560 on that dose.  We will have him continue on that dose until March 2021 when we will retest.      Patient Active Problem List    Diagnosis Date Noted   • Benign prostatic hyperplasia  10/02/2020   • Bilateral hand pain 2018   • Other specified hypothyroidism 2018   • Bilateral hearing loss 2017   • Chronic kidney disease (CKD), stage III (moderate) 10/06/2016   • Recurrent nephrolithiasis 2015   • Vitamin D deficiency disease 2014   • Fatty liver 2011   • Mixed hyperlipidemia 2010   • Controlled type 2 diabetes mellitus with complication, without long-term current use of insulin (HCC) 2010      Allergies:Ace inhibitors and Metformin    Current Outpatient Medications   Medication Sig Dispense Refill   • levothyroxine (SYNTHROID) 50 MCG Tab TAKE ONE TABLET BY MOUTH EVERY MORNING ON EMPTY STOMACH 90 Tab 3   • simvastatin (ZOCOR) 40 MG Tab TAKE ONE TABLET BY MOUTH EVERY EVENING 90 Tab 0   • hydroCHLOROthiazide (HYDRODIURIL) 25 MG Tab      • Diclofenac Sodium 1 % Gel Apply 2 g to skin as directed 4 times a day as needed (pain). 150 g 3   • glimepiride (AMARYL) 2 MG Tab TAKE 1 TABLET BY MOUTH TWICE DAILY 180 Tab 3   • fenofibrate (TRICOR) 48 MG Tab Take 1 Tab by mouth every day. 90 Tab 3   • CONTOUR NEXT TEST strip USE ONCE A DAY AS NEEDED FOR HIGH OR LOW SUGAR 100 Strip 3   • tamsulosin (FLOMAX) 0.4 MG capsule Take 0.4 mg by mouth every evening.     • aspirin 81 MG tablet Take 1 Tab by mouth every day. 100 Tab 3   • vitamin D (CHOLECALCIFEROL) 1000 UNIT Tab Take 1,000-2,000 Units by mouth 2 Times a Day. 1 tab in the am, 2 tabs in the pm       No current facility-administered medications for this visit.        Social History     Tobacco Use   • Smoking status: Former Smoker     Packs/day: 1.00     Years: 20.00     Pack years: 20.00     Types: Cigarettes     Quit date: 10/26/1986     Years since quittin.9   • Smokeless tobacco: Never Used   Substance Use Topics   • Alcohol use: Not Currently     Alcohol/week: 0.0 oz     Frequency: Never     Binge frequency: Never     Comment: a few a year   • Drug use: No     Social History     Social History  "Narrative    - no children       Family History   Problem Relation Age of Onset   • GI Disease Father         polyps   • No Known Problems Mother         ROS:     - Constitutional:  Negative for fever, chills, unexpected weight change, and fatigue/generalized weakness.    - HEENT:  Negative for headaches, vision changes, hearing changes, ear pain, ear discharge, rhinorrhea, sinus congestion, sore throat, and neck pain.      - Respiratory: Negative for cough, sputum production, chest congestion, dyspnea, wheezing, and crackles.      - Cardiovascular: Negative for chest pain, palpitations, orthopnea, and bilateral lower extremity edema.     - Gastrointestinal: Negative for heartburn, nausea, vomiting, abdominal pain, hematochezia, melena, diarrhea, constipation, and greasy/foul-smelling stools.     - Genitourinary: Negative for dysuria, polyuria, hematuria, pyuria, urinary urgency, and urinary incontinence.     - Musculoskeletal: Positive for bilateral hand pain and bilateral shoulder pain along with low back pain.      - Skin: Negative for rash, itching, cyanotic skin color change.     - Neurological: Negative for dizziness, tingling, tremors, focal sensory deficit, focal weakness and headaches.     - Endo/Heme/Allergies: Does not bruise/bleed easily.     - Psychiatric/Behavioral: Negative for depression, suicidal/homicidal ideation and memory loss.          - NOTE: All other systems reviewed and are negative, except as in HPI.      Exam:    /62 (BP Location: Left arm, Patient Position: Sitting, BP Cuff Size: Adult)   Pulse 70   Temp 36.6 °C (97.9 °F) (Temporal)   Resp 16   Ht 1.74 m (5' 8.5\")   Wt 72.6 kg (160 lb)   SpO2 96%  Body mass index is 23.97 kg/m².    General:  Well nourished, well developed male in NAD  Head is grossly normal.  Neck: Supple without JVD or bruit. Thyroid is not enlarged.  Pulmonary: Clear to ausculation and percussion.  Normal effort. No rales, ronchi, or " wheezing.  Cardiovascular: Regular rate and rhythm without murmur. Carotid and radial pulses are intact and equal bilaterally.  Extremities: no clubbing, cyanosis, or edema.  Diabetic Foot Exam: No ulcers or skin lesions present, patient tested with a 10 g force and is sensitive bilaterally throughout the ball of the foot, great toe and heel.  Dorsalis pedis and posterior tibial pulses are present and intact bilaterally    Please note that this dictation was created using voice recognition software. I have made every reasonable attempt to correct obvious errors, but I expect that there are errors of grammar and possibly content that I did not discover before finalizing the note.    Assessment/Plan:  1. Stage 3b chronic kidney disease  Uncontrolled, I would like to check this patient's GFR again and see where he stands.  We might be able to improve his kidney function if we get him on an ACE inhibitor instead of hydrochlorothiazide for his hypertension.    2. Controlled type 2 diabetes mellitus with complication, without long-term current use of insulin (HCC)  Well-controlled, patient brings his blood sugars with him and out of the last 90 days he has had 89 that were in the normal range.  - HEMOGLOBIN A1C; Future  - Diabetic Monofilament Lower Extremity Exam    3. Mixed hyperlipidemia  Unknown control, we will recheck things and see him back in 4 weeks.  - Comp Metabolic Panel; Future  - Lipid Profile; Future    4. Screening PSA (prostate specific antigen)  We will get a screening PSA for this patient  - PROSTATE SPECIFIC AG SCREENING; Future    5. Other specified hypothyroidism  Adequately controlled with current meds

## 2020-10-02 NOTE — ASSESSMENT & PLAN NOTE
This is a chronic health problem for this patient for which she is on levothyroxine 50 mcg daily.  He did have a TSH back in March that came back in the normal range at 3.560 on that dose.  We will have him continue on that dose until March 2021 when we will retest.

## 2020-10-22 ENCOUNTER — HOSPITAL ENCOUNTER (OUTPATIENT)
Dept: LAB | Facility: MEDICAL CENTER | Age: 75
End: 2020-10-22
Attending: FAMILY MEDICINE
Payer: MEDICARE

## 2020-10-22 DIAGNOSIS — Z12.5 SCREENING PSA (PROSTATE SPECIFIC ANTIGEN): ICD-10-CM

## 2020-10-22 DIAGNOSIS — E78.2 MIXED HYPERLIPIDEMIA: ICD-10-CM

## 2020-10-22 DIAGNOSIS — E11.8 CONTROLLED TYPE 2 DIABETES MELLITUS WITH COMPLICATION, WITHOUT LONG-TERM CURRENT USE OF INSULIN (HCC): ICD-10-CM

## 2020-10-22 PROCEDURE — 83036 HEMOGLOBIN GLYCOSYLATED A1C: CPT | Mod: GA

## 2020-10-22 PROCEDURE — 80053 COMPREHEN METABOLIC PANEL: CPT

## 2020-10-22 PROCEDURE — 84153 ASSAY OF PSA TOTAL: CPT | Mod: GA

## 2020-10-22 PROCEDURE — 80061 LIPID PANEL: CPT

## 2020-10-22 PROCEDURE — 36415 COLL VENOUS BLD VENIPUNCTURE: CPT | Mod: GA

## 2020-10-23 LAB
ALBUMIN SERPL BCP-MCNC: 4.4 G/DL (ref 3.2–4.9)
ALBUMIN/GLOB SERPL: 1.6 G/DL
ALP SERPL-CCNC: 77 U/L (ref 30–99)
ALT SERPL-CCNC: 22 U/L (ref 2–50)
ANION GAP SERPL CALC-SCNC: 10 MMOL/L (ref 7–16)
AST SERPL-CCNC: 22 U/L (ref 12–45)
BILIRUB SERPL-MCNC: 0.5 MG/DL (ref 0.1–1.5)
BUN SERPL-MCNC: 24 MG/DL (ref 8–22)
CALCIUM SERPL-MCNC: 9.5 MG/DL (ref 8.5–10.5)
CHLORIDE SERPL-SCNC: 107 MMOL/L (ref 96–112)
CHOLEST SERPL-MCNC: 107 MG/DL (ref 100–199)
CO2 SERPL-SCNC: 25 MMOL/L (ref 20–33)
CREAT SERPL-MCNC: 1.99 MG/DL (ref 0.5–1.4)
EST. AVERAGE GLUCOSE BLD GHB EST-MCNC: 151 MG/DL
FASTING STATUS PATIENT QL REPORTED: NORMAL
GLOBULIN SER CALC-MCNC: 2.8 G/DL (ref 1.9–3.5)
GLUCOSE SERPL-MCNC: 83 MG/DL (ref 65–99)
HBA1C MFR BLD: 6.9 % (ref 0–5.6)
HDLC SERPL-MCNC: 36 MG/DL
LDLC SERPL CALC-MCNC: 43 MG/DL
POTASSIUM SERPL-SCNC: 3.5 MMOL/L (ref 3.6–5.5)
PROT SERPL-MCNC: 7.2 G/DL (ref 6–8.2)
PSA SERPL-MCNC: 3.4 NG/ML (ref 0–4)
SODIUM SERPL-SCNC: 142 MMOL/L (ref 135–145)
TRIGL SERPL-MCNC: 140 MG/DL (ref 0–149)

## 2020-11-05 DIAGNOSIS — E78.01 FAMILIAL HYPERCHOLESTEROLEMIA: ICD-10-CM

## 2020-11-10 RX ORDER — SIMVASTATIN 40 MG
TABLET ORAL
Qty: 90 TAB | Refills: 3 | Status: SHIPPED | OUTPATIENT
Start: 2020-11-10 | End: 2021-11-08 | Stop reason: SDUPTHER

## 2020-12-01 ENCOUNTER — APPOINTMENT (OUTPATIENT)
Dept: MEDICAL GROUP | Facility: PHYSICIAN GROUP | Age: 75
End: 2020-12-01
Payer: MEDICARE

## 2020-12-11 ENCOUNTER — PATIENT MESSAGE (OUTPATIENT)
Dept: MEDICAL GROUP | Facility: PHYSICIAN GROUP | Age: 75
End: 2020-12-11

## 2021-01-11 DIAGNOSIS — Z23 NEED FOR VACCINATION: ICD-10-CM

## 2021-04-06 RX ORDER — FENOFIBRATE 48 MG/1
TABLET, COATED ORAL
Qty: 90 TABLET | Refills: 0 | Status: SHIPPED | OUTPATIENT
Start: 2021-04-06 | End: 2021-07-30 | Stop reason: SDUPTHER

## 2021-07-30 PROBLEM — M18.9 OSTEOARTHRITIS OF CARPOMETACARPAL (CMC) JOINT OF THUMB: Status: ACTIVE | Noted: 2021-02-26

## 2021-07-30 RX ORDER — FENOFIBRATE 48 MG/1
48 TABLET, COATED ORAL
Qty: 90 TABLET | Refills: 3 | Status: SHIPPED | OUTPATIENT
Start: 2021-07-30 | End: 2022-07-27 | Stop reason: SDUPTHER

## 2021-07-30 RX ORDER — GLIMEPIRIDE 2 MG/1
TABLET ORAL
COMMUNITY
Start: 2021-07-05 | End: 2021-10-14

## 2021-08-16 RX ORDER — LEVOTHYROXINE SODIUM 0.05 MG/1
50 TABLET ORAL
Qty: 90 TABLET | Refills: 3 | Status: SHIPPED | OUTPATIENT
Start: 2021-08-16 | End: 2022-08-03

## 2021-08-31 DIAGNOSIS — N18.32 STAGE 3B CHRONIC KIDNEY DISEASE: ICD-10-CM

## 2021-08-31 DIAGNOSIS — E55.9 VITAMIN D DEFICIENCY: ICD-10-CM

## 2021-08-31 DIAGNOSIS — E78.2 MIXED HYPERLIPIDEMIA: ICD-10-CM

## 2021-08-31 DIAGNOSIS — E03.9 HYPOTHYROIDISM (ACQUIRED): ICD-10-CM

## 2021-08-31 DIAGNOSIS — E11.8 CONTROLLED TYPE 2 DIABETES MELLITUS WITH COMPLICATION, WITHOUT LONG-TERM CURRENT USE OF INSULIN (HCC): ICD-10-CM

## 2021-09-08 ENCOUNTER — HOSPITAL ENCOUNTER (OUTPATIENT)
Dept: LAB | Facility: MEDICAL CENTER | Age: 76
End: 2021-09-08
Attending: FAMILY MEDICINE
Payer: MEDICARE

## 2021-09-08 DIAGNOSIS — E78.2 MIXED HYPERLIPIDEMIA: ICD-10-CM

## 2021-09-08 DIAGNOSIS — E03.9 HYPOTHYROIDISM (ACQUIRED): ICD-10-CM

## 2021-09-08 DIAGNOSIS — E11.8 CONTROLLED TYPE 2 DIABETES MELLITUS WITH COMPLICATION, WITHOUT LONG-TERM CURRENT USE OF INSULIN (HCC): ICD-10-CM

## 2021-09-08 DIAGNOSIS — E55.9 VITAMIN D DEFICIENCY: ICD-10-CM

## 2021-09-08 LAB
EST. AVERAGE GLUCOSE BLD GHB EST-MCNC: 120 MG/DL
HBA1C MFR BLD: 5.8 % (ref 4–5.6)

## 2021-09-08 PROCEDURE — 80053 COMPREHEN METABOLIC PANEL: CPT

## 2021-09-08 PROCEDURE — 84443 ASSAY THYROID STIM HORMONE: CPT

## 2021-09-08 PROCEDURE — 83036 HEMOGLOBIN GLYCOSYLATED A1C: CPT | Mod: GA

## 2021-09-08 PROCEDURE — 80061 LIPID PANEL: CPT

## 2021-09-08 PROCEDURE — 82306 VITAMIN D 25 HYDROXY: CPT

## 2021-09-08 PROCEDURE — 36415 COLL VENOUS BLD VENIPUNCTURE: CPT

## 2021-09-09 ENCOUNTER — HOSPITAL ENCOUNTER (OUTPATIENT)
Facility: MEDICAL CENTER | Age: 76
End: 2021-09-09
Attending: FAMILY MEDICINE
Payer: MEDICARE

## 2021-09-09 LAB
25(OH)D3 SERPL-MCNC: 54 NG/ML (ref 30–100)
ALBUMIN SERPL BCP-MCNC: 4.3 G/DL (ref 3.2–4.9)
ALBUMIN/GLOB SERPL: 1.6 G/DL
ALP SERPL-CCNC: 73 U/L (ref 30–99)
ALT SERPL-CCNC: 21 U/L (ref 2–50)
ANION GAP SERPL CALC-SCNC: 12 MMOL/L (ref 7–16)
AST SERPL-CCNC: 26 U/L (ref 12–45)
BILIRUB SERPL-MCNC: 0.7 MG/DL (ref 0.1–1.5)
BUN SERPL-MCNC: 20 MG/DL (ref 8–22)
CALCIUM SERPL-MCNC: 9.6 MG/DL (ref 8.5–10.5)
CHLORIDE SERPL-SCNC: 112 MMOL/L (ref 96–112)
CHOLEST SERPL-MCNC: 101 MG/DL (ref 100–199)
CO2 SERPL-SCNC: 19 MMOL/L (ref 20–33)
CREAT SERPL-MCNC: 2.02 MG/DL (ref 0.5–1.4)
FASTING STATUS PATIENT QL REPORTED: NORMAL
GLOBULIN SER CALC-MCNC: 2.7 G/DL (ref 1.9–3.5)
GLUCOSE SERPL-MCNC: 66 MG/DL (ref 65–99)
HDLC SERPL-MCNC: 38 MG/DL
LDLC SERPL CALC-MCNC: 41 MG/DL
POTASSIUM SERPL-SCNC: 3.6 MMOL/L (ref 3.6–5.5)
PROT SERPL-MCNC: 7 G/DL (ref 6–8.2)
SODIUM SERPL-SCNC: 143 MMOL/L (ref 135–145)
TRIGL SERPL-MCNC: 108 MG/DL (ref 0–149)
TSH SERPL DL<=0.005 MIU/L-ACNC: 2.47 UIU/ML (ref 0.38–5.33)

## 2021-09-09 PROCEDURE — 82043 UR ALBUMIN QUANTITATIVE: CPT

## 2021-09-09 PROCEDURE — 82570 ASSAY OF URINE CREATININE: CPT

## 2021-09-10 ENCOUNTER — OFFICE VISIT (OUTPATIENT)
Dept: INTERNAL MEDICINE | Facility: IMAGING CENTER | Age: 76
End: 2021-09-10
Payer: MEDICARE

## 2021-09-10 VITALS
TEMPERATURE: 98.1 F | HEIGHT: 70 IN | HEART RATE: 71 BPM | RESPIRATION RATE: 16 BRPM | SYSTOLIC BLOOD PRESSURE: 138 MMHG | OXYGEN SATURATION: 99 % | BODY MASS INDEX: 22.9 KG/M2 | DIASTOLIC BLOOD PRESSURE: 68 MMHG | WEIGHT: 160 LBS

## 2021-09-10 DIAGNOSIS — E03.8 OTHER SPECIFIED HYPOTHYROIDISM: ICD-10-CM

## 2021-09-10 DIAGNOSIS — N40.0 BENIGN PROSTATIC HYPERPLASIA WITHOUT LOWER URINARY TRACT SYMPTOMS: ICD-10-CM

## 2021-09-10 DIAGNOSIS — K76.0 FATTY LIVER: ICD-10-CM

## 2021-09-10 DIAGNOSIS — N18.32 STAGE 3B CHRONIC KIDNEY DISEASE: ICD-10-CM

## 2021-09-10 DIAGNOSIS — E55.9 VITAMIN D DEFICIENCY: ICD-10-CM

## 2021-09-10 DIAGNOSIS — E78.2 MIXED HYPERLIPIDEMIA: ICD-10-CM

## 2021-09-10 DIAGNOSIS — E11.22 CONTROLLED TYPE 2 DIABETES MELLITUS WITH STAGE 3 CHRONIC KIDNEY DISEASE, WITHOUT LONG-TERM CURRENT USE OF INSULIN (HCC): ICD-10-CM

## 2021-09-10 DIAGNOSIS — H90.3 SENSORINEURAL HEARING LOSS (SNHL) OF BOTH EARS: ICD-10-CM

## 2021-09-10 DIAGNOSIS — N18.30 CONTROLLED TYPE 2 DIABETES MELLITUS WITH STAGE 3 CHRONIC KIDNEY DISEASE, WITHOUT LONG-TERM CURRENT USE OF INSULIN (HCC): ICD-10-CM

## 2021-09-10 LAB
CREAT UR-MCNC: 75.07 MG/DL
MICROALBUMIN UR-MCNC: <1.2 MG/DL
MICROALBUMIN/CREAT UR: NORMAL MG/G (ref 0–30)

## 2021-09-10 PROCEDURE — 99214 OFFICE O/P EST MOD 30 MIN: CPT | Performed by: FAMILY MEDICINE

## 2021-09-10 ASSESSMENT — FIBROSIS 4 INDEX: FIB4 SCORE: 2.73

## 2021-09-10 ASSESSMENT — PATIENT HEALTH QUESTIONNAIRE - PHQ9: CLINICAL INTERPRETATION OF PHQ2 SCORE: 0

## 2021-09-10 NOTE — ASSESSMENT & PLAN NOTE
This is a chronic health problem that is stable.  His GFR is stable at 32 and he is making certain he gets adequate hydration.

## 2021-09-10 NOTE — PROGRESS NOTES
CC:Diagnoses of Benign prostatic hyperplasia without lower urinary tract symptoms, Sensorineural hearing loss (SNHL) of both ears, Stage 3b chronic kidney disease (HCC), Controlled type 2 diabetes mellitus with stage 3 chronic kidney disease, without long-term current use of insulin (HCC), Fatty liver, Mixed hyperlipidemia, Other specified hypothyroidism, and Vitamin D deficiency disease were pertinent to this visit.     HISTORY OF PRESENT ILLNESS: Patient is a 76 y.o. male established patient who presents today to re-establish care and follow up on labs.       Benign prostatic hyperplasia  This is a chronic health problem that he manages with medication.  He rarely has to get up at night anymore.    Bilateral hearing loss  This is a chronic health problem for which he wears bilateral hearing aids with excellent results.    Chronic kidney disease (CKD), stage III (moderate)  This is a chronic health problem that is stable.  His GFR is stable at 32 and he is making certain he gets adequate hydration.    Controlled type 2 diabetes mellitus with chronic kidney disease (HCC)  Chronic health problem well controlled with current medications.  His fasting glucose was actually almost low at 66 and his A1c is excellent at 5.8.  We will have him continue with current medications and lifestyle.      Fatty liver  This is a chronic problem for this patient that was present prior to his getting his Diabetes diagnosed and now well controlled.  His liver functions are totally back to normal with his SGOT coming in at 26 and SGPT 21 and alk phos 73.  We are going to resolve this issue off of his problem list.    Mixed hyperlipidemia  This is a chronic health problem for this patient for which he is on simvastatin 40 mg daily.  His total cholesterol is down to 101, triglycerides 108, HDL excellent at 38 and the LDL is excellent at 41.  There is no liver dysfunction.  We will continue with that medication long-term.    Other specified  hypothyroidism  This is a chronic health problem for this patient who is on levothyroxine 50 mcg daily.  His TSH is totally in the normal range at 2.470.  We will continue with that dose long-term.    Vitamin D deficiency disease  This is a chronic health problem that is well controlled with his current Vitamin D supplementation.  His level is good at 54.      Patient Active Problem List    Diagnosis Date Noted   • Osteoarthritis of carpometacarpal (CMC) joint of thumb 02/26/2021   • Benign prostatic hyperplasia 10/02/2020   • Bilateral hand pain 02/22/2018   • Other specified hypothyroidism 02/22/2018   • Bilateral hearing loss 08/17/2017   • Chronic kidney disease (CKD), stage III (moderate) ([facility]) 10/06/2016   • Recurrent nephrolithiasis 05/13/2015   • Vitamin D deficiency disease 08/28/2014   • Mixed hyperlipidemia 12/01/2010   • Controlled type 2 diabetes mellitus with chronic kidney disease ([facility]) 12/01/2010      Allergies:Ace inhibitors and Metformin    Current Outpatient Medications   Medication Sig Dispense Refill   • levothyroxine (SYNTHROID) 50 MCG Tab Take 1 Tablet by mouth every morning before breakfast. TAKE ONE TABLET BY MOUTH EVERY MORNING ON EMPTY STOMACH 90 Tablet 3   • glimepiride (AMARYL) 2 MG Tab      • fenofibrate (TRICOR) 48 MG Tab Take 1 tablet by mouth every day. 90 tablet 3   • simvastatin (ZOCOR) 40 MG Tab TAKE ONE TABLET BY MOUTH IN THE EVENING 90 Tab 3   • Diclofenac Sodium 1 % Gel Apply 2 g to skin as directed 4 times a day as needed (pain). 150 g 3   • CONTOUR NEXT TEST strip USE ONCE A DAY AS NEEDED FOR HIGH OR LOW SUGAR 100 Strip 3   • aspirin 81 MG tablet Take 1 Tab by mouth every day. 100 Tab 3   • vitamin D (CHOLECALCIFEROL) 1000 UNIT Tab Take 1,000-2,000 Units by mouth 2 Times a Day. 1 tab in the am, 2 tabs in the pm       No current facility-administered medications for this visit.       Social History     Tobacco Use   • Smoking status: Former Smoker     Packs/day: 1.00      "Years: 20.00     Pack years: 20.00     Types: Cigarettes     Quit date: 10/26/1986     Years since quittin.8   • Smokeless tobacco: Never Used   Vaping Use   • Vaping Use: Never used   Substance Use Topics   • Alcohol use: Not Currently     Alcohol/week: 0.0 oz     Comment: a few a year   • Drug use: No     Social History     Social History Narrative    - no children       Family History   Problem Relation Age of Onset   • GI Disease Father         polyps   • No Known Problems Mother         ROS:     - Constitutional:  Negative for fever, chills, unexpected weight change, and fatigue/generalized weakness.    - HEENT:  Negative for headaches, vision changes, hearing changes, ear pain, ear discharge, rhinorrhea, sinus congestion, sore throat, and neck pain.      - Respiratory: Negative for cough, sputum production, chest congestion, dyspnea, wheezing, and crackles.      - Cardiovascular: Negative for chest pain, palpitations, orthopnea, and bilateral lower extremity edema.     - Gastrointestinal: Negative for heartburn, nausea, vomiting, abdominal pain, hematochezia, melena, diarrhea, constipation, and greasy/foul-smelling stools.     - Genitourinary: Negative for dysuria, polyuria, hematuria, pyuria, urinary urgency, and urinary incontinence.     - Musculoskeletal: Negative for myalgias, back pain, and joint pain.     - Skin: Negative for rash, itching, cyanotic skin color change.     - Neurological: Negative for dizziness, tingling, tremors, focal sensory deficit, focal weakness and headaches.     - Endo/Heme/Allergies: Does not bruise/bleed easily.     - Psychiatric/Behavioral:Positive for sadness secondary to losing lots of friends to illness.          - NOTE: All other systems reviewed and are negative, except as in HPI.      Exam:    /68 (BP Location: Left arm, Patient Position: Sitting, BP Cuff Size: Adult)   Pulse 71   Temp 36.7 °C (98.1 °F) (Temporal)   Resp 16   Ht 1.778 m (5' 10\")   " Wt 72.6 kg (160 lb)   SpO2 99%  Body mass index is 22.96 kg/m².    General:  Well nourished, well developed male in NAD  Head is grossly normal.  Neck: Supple without JVD or bruit. Thyroid is not enlarged.  Pulmonary: Clear to ausculation and percussion.  Normal effort. No rales, ronchi, or wheezing.  Cardiovascular: Regular rate and rhythm without murmur. Carotid and radial pulses are intact and equal bilaterally.  Extremities: no clubbing, cyanosis, or edema.  LABS: 9/8/21: Results reviewed and discussed with the patient, questions answered.    Please note that this dictation was created using voice recognition software. I have made every reasonable attempt to correct obvious errors, but I expect that there are errors of grammar and possibly content that I did not discover before finalizing the note.    Assessment/Plan:  1. Benign prostatic hyperplasia without lower urinary tract symptoms  Currently chronic problem that is stable.    2. Sensorineural hearing loss (SNHL) of both ears  Chronic problem well-controlled with hearing aids bilaterally    3. Stage 3b chronic kidney disease (HCC)  Chronic health problems stable    4. Controlled type 2 diabetes mellitus with stage 3 chronic kidney disease, without long-term current use of insulin (HCC)  Chronic health problem the patient is doing very well.  We did his monofilament today and his foot is sensitive.  - Diabetic Monofilament Lower Extremity Exam    5. Fatty liver  This had been a problem for the patient in the past but he no longer has elevated liver functions we will resolve this.    6. Mixed hyperlipidemia  Chronic health problem well-controlled with fenofibrate and simvastatin    7. Other specified hypothyroidism  Chronic health problem well-controlled with levothyroxine 50 mcg daily    8. Vitamin D deficiency disease  Chronic health problem well-controlled with current dosing of over-the-counter vitamin D.

## 2021-09-10 NOTE — ASSESSMENT & PLAN NOTE
This is a chronic health problem that he manages with medication.  He rarely has to get up at night anymore.

## 2021-09-10 NOTE — ASSESSMENT & PLAN NOTE
This is a chronic health problem for which he wears bilateral hearing aids with excellent results.

## 2021-09-10 NOTE — ASSESSMENT & PLAN NOTE
This is a chronic health problem for this patient who is on levothyroxine 50 mcg daily.  His TSH is totally in the normal range at 2.470.  We will continue with that dose long-term.

## 2021-09-10 NOTE — ASSESSMENT & PLAN NOTE
This is a chronic problem for this patient that was present prior to his getting his Diabetes diagnosed and now well controlled.  His liver functions are totally back to normal with his SGOT coming in at 26 and SGPT 21 and alk phos 73.  We are going to resolve this issue off of his problem list.

## 2021-09-10 NOTE — ASSESSMENT & PLAN NOTE
This is a chronic health problem that is well controlled with his current Vitamin D supplementation.  His level is good at 54.

## 2021-09-10 NOTE — ASSESSMENT & PLAN NOTE
Chronic health problem well controlled with current medications.  His fasting glucose was actually almost low at 66 and his A1c is excellent at 5.8.  We will have him continue with current medications and lifestyle.

## 2021-11-08 DIAGNOSIS — E78.01 FAMILIAL HYPERCHOLESTEROLEMIA: ICD-10-CM

## 2021-11-11 RX ORDER — SIMVASTATIN 40 MG
TABLET ORAL
Qty: 90 TABLET | Refills: 3 | Status: SHIPPED | OUTPATIENT
Start: 2021-11-11 | End: 2022-09-16

## 2021-11-12 NOTE — TELEPHONE ENCOUNTER
Received request via: Patient    Was the patient seen in the last year in this department? Yes    Does the patient have an active prescription (recently filled or refills available) for medication(s) requested? No    Requested Prescriptions     Pending Prescriptions Disp Refills    simvastatin (ZOCOR) 40 MG Tab 90 Tablet 3     Sig: TAKE ONE TABLET BY MOUTH IN THE EVENING

## 2022-01-20 ENCOUNTER — PATIENT MESSAGE (OUTPATIENT)
Dept: INTERNAL MEDICINE | Facility: IMAGING CENTER | Age: 77
End: 2022-01-20

## 2022-01-20 DIAGNOSIS — N18.30 CONTROLLED TYPE 2 DIABETES MELLITUS WITH STAGE 3 CHRONIC KIDNEY DISEASE, WITHOUT LONG-TERM CURRENT USE OF INSULIN (HCC): ICD-10-CM

## 2022-01-20 DIAGNOSIS — E11.22 CONTROLLED TYPE 2 DIABETES MELLITUS WITH STAGE 3 CHRONIC KIDNEY DISEASE, WITHOUT LONG-TERM CURRENT USE OF INSULIN (HCC): ICD-10-CM

## 2022-01-29 ENCOUNTER — OFFICE VISIT (OUTPATIENT)
Dept: URGENT CARE | Facility: PHYSICIAN GROUP | Age: 77
End: 2022-01-29
Payer: MEDICARE

## 2022-01-29 VITALS
HEART RATE: 80 BPM | TEMPERATURE: 97.9 F | WEIGHT: 164 LBS | RESPIRATION RATE: 14 BRPM | HEIGHT: 72 IN | SYSTOLIC BLOOD PRESSURE: 138 MMHG | OXYGEN SATURATION: 99 % | BODY MASS INDEX: 22.21 KG/M2 | DIASTOLIC BLOOD PRESSURE: 62 MMHG

## 2022-01-29 DIAGNOSIS — L03.011 PARONYCHIA OF RIGHT THUMB: ICD-10-CM

## 2022-01-29 PROCEDURE — 99214 OFFICE O/P EST MOD 30 MIN: CPT | Performed by: FAMILY MEDICINE

## 2022-01-29 RX ORDER — DOXYCYCLINE HYCLATE 100 MG
100 TABLET ORAL 2 TIMES DAILY
Qty: 20 TABLET | Refills: 0 | Status: SHIPPED | OUTPATIENT
Start: 2022-01-29 | End: 2022-02-08

## 2022-01-29 ASSESSMENT — FIBROSIS 4 INDEX: FIB4 SCORE: 2.77

## 2022-01-29 NOTE — PROGRESS NOTES
Chief Complaint:    Chief Complaint   Patient presents with   • Finger Pain     swollen cuticle x3 days       History of Present Illness:    Right thumb redness, pain, and swelling x 3-4 days.      Past Medical History:    Past Medical History:   Diagnosis Date   • Diabetes mellitus type II     oral medications   • Elevated BP 2010   • Hearing decreased     seeing ENT   • High cholesterol    • Hyperlipidemia 2010   • Hypertriglyceridemia 2010   • Mixed hyperlipidemia 2010   • Renal stone     sees Urology   • Transaminitis 2011     Past Surgical History:    Past Surgical History:   Procedure Laterality Date   • WI CYSTOSCOPY,INSERT URETERAL STENT Bilateral 10/1/2019    Procedure: CYSTOSCOPY, WITH URETERAL STENT INSERTION;  Surgeon: Daniel Ko M.D.;  Location: SURGERY [facility];  Service: Urology   • URETEROSCOPY Bilateral 10/1/2019    Procedure: URETEROSCOPY;  Surgeon: Daniel Ko M.D.;  Location: SURGERY [facility];  Service: Urology   • LASERTRIPSY Bilateral 10/1/2019    Procedure: LITHOTRIPSY, USING LASER;  Surgeon: Daniel Ko M.D.;  Location: SURGERY [facility];  Service: Urology   • LITHOTRIPSY  2015,2017, 2019    renal stone removal  x 3   • OTHER Left     pinky finger, windshield glass removal   • TONSILLECTOMY       Social History:    Social History     Socioeconomic History   • Marital status:      Spouse name: Not on file   • Number of children: Not on file   • Years of education: Not on file   • Highest education level: Not on file   Occupational History   • Occupation: Retired     Employer: OTHER   Tobacco Use   • Smoking status: Former Smoker     Packs/day: 1.00     Years: 20.00     Pack years: 20.00     Types: Cigarettes     Quit date: 10/26/1986     Years since quittin.2   • Smokeless tobacco: Never Used   Vaping Use   • Vaping Use: Never used   Substance and Sexual Activity   • Alcohol use: Not Currently     Alcohol/week:  0.0 oz     Comment: a few a year   • Drug use: No   • Sexual activity: Not Currently     Partners: Female     Comment: ,    Other Topics Concern   •  Service No   • Blood Transfusions No   • Caffeine Concern No   • Occupational Exposure No   • Hobby Hazards No   • Sleep Concern No   • Stress Concern No   • Weight Concern No   • Special Diet No   • Back Care No   • Exercise Yes   • Bike Helmet No   • Seat Belt Yes   • Self-Exams No   Social History Narrative    - no children     Social Determinants of Health     Financial Resource Strain:    • Difficulty of Paying Living Expenses: Not on file   Food Insecurity:    • Worried About Running Out of Food in the Last Year: Not on file   • Ran Out of Food in the Last Year: Not on file   Transportation Needs:    • Lack of Transportation (Medical): Not on file   • Lack of Transportation (Non-Medical): Not on file   Physical Activity:    • Days of Exercise per Week: Not on file   • Minutes of Exercise per Session: Not on file   Stress:    • Feeling of Stress : Not on file   Social Connections:    • Frequency of Communication with Friends and Family: Not on file   • Frequency of Social Gatherings with Friends and Family: Not on file   • Attends [employer] Services: Not on file   • Active Member of Clubs or Organizations: Not on file   • Attends Club or Organization Meetings: Not on file   • Marital Status: Not on file   Intimate Partner Violence:    • Fear of Current or Ex-Partner: Not on file   • Emotionally Abused: Not on file   • Physically Abused: Not on file   • Sexually Abused: Not on file   Housing Stability:    • Unable to Pay for Housing in the Last Year: Not on file   • Number of Places Lived in the Last Year: Not on file   • Unstable Housing in the Last Year: Not on file     Family History:    Family History   Problem Relation Age of Onset   • GI Disease Father         polyps   • No Known Problems Mother      Medications:    Current  Outpatient Medications on File Prior to Visit   Medication Sig Dispense Refill   • simvastatin (ZOCOR) 40 MG Tab TAKE ONE TABLET BY MOUTH IN THE EVENING 90 Tablet 3   • glimepiride (AMARYL) 2 MG Tab TAKE 1 TABLET BY MOUTH TWO TIMES A  Tablet 3   • levothyroxine (SYNTHROID) 50 MCG Tab Take 1 Tablet by mouth every morning before breakfast. TAKE ONE TABLET BY MOUTH EVERY MORNING ON EMPTY STOMACH 90 Tablet 3   • fenofibrate (TRICOR) 48 MG Tab Take 1 tablet by mouth every day. 90 tablet 3   • Diclofenac Sodium 1 % Gel Apply 2 g to skin as directed 4 times a day as needed (pain). 150 g 3   • CONTOUR NEXT TEST strip USE ONCE A DAY AS NEEDED FOR HIGH OR LOW SUGAR 100 Strip 3   • aspirin 81 MG tablet Take 1 Tab by mouth every day. 100 Tab 3   • vitamin D (CHOLECALCIFEROL) 1000 UNIT Tab Take 1,000-2,000 Units by mouth 2 Times a Day. 1 tab in the am, 2 tabs in the pm       No current facility-administered medications on file prior to visit.     Allergies:    Allergies   Allergen Reactions   • Ace Inhibitors Cough     Cough     • Metformin Diarrhea     Diarrhea       Vitals:    Vitals:    01/29/22 1338   BP: 138/62   Pulse: 80   Resp: 14   Temp: 36.6 °C (97.9 °F)   SpO2: 99%   Weight: 74.4 kg (164 lb)   Height: 1.829 m (6')       Physical Exam:    Constitutional: Vital signs reviewed. Appears well-developed and well-nourished. No acute distress.   Eyes: Sclera white, conjunctivae clear.  ENT: External ears normal. Hearing normal.  Pulmonary/Chest: Respirations non-labored.  Musculoskeletal: Normal gait. No muscular atrophy or weakness.  Neurological: Alert and oriented to person, place, and time. Muscle tone normal. Coordination normal. Light touch and sensation normal.   Skin: Right thumb: at inferior, radial aspect adjacent to nail is large pus pocket with surrounding erythema, swelling, and tenderness of the skin.  Psychiatric: Normal mood and affect. Behavior is normal. Judgment and thought content normal.        Medical Decision Makin. Paronychia of right thumb  - doxycycline (VIBRAMYCIN) 100 MG Tab; Take 1 Tablet by mouth 2 times a day for 10 days.  Dispense: 20 Tablet; Refill: 0      Discussed with him DDX, management options, and risks, benefits, and alternatives to treatment plan agreed upon.    Right thumb redness, pain, and swelling x 3-4 days.    Right thumb: at inferior, radial aspect adjacent to nail is large pus pocket with surrounding erythema, swelling, and tenderness of the skin.    Agreeable to brice open the pus pocket. Skin cleansed with alcohol and 23-G needle used to brice open pus pocket. Pus manually expressed from site. This caused area to be much less swollen and he reported improvement of some pain.    Complicated condition as this needed to be lanced open.    Agreeable to antibiotic medication prescribed to fully resolve condition.    Discussed expected course of duration, time for improvement, and to seek follow-up in Emergency Room, urgent care, or with PCP if getting worse at any time or not improving within expected time frame.

## 2022-02-03 ENCOUNTER — PATIENT MESSAGE (OUTPATIENT)
Dept: INTERNAL MEDICINE | Facility: IMAGING CENTER | Age: 77
End: 2022-02-03

## 2022-03-14 ENCOUNTER — HOSPITAL ENCOUNTER (OUTPATIENT)
Dept: LAB | Facility: MEDICAL CENTER | Age: 77
End: 2022-03-14
Attending: FAMILY MEDICINE
Payer: MEDICARE

## 2022-03-14 DIAGNOSIS — N18.30 CONTROLLED TYPE 2 DIABETES MELLITUS WITH STAGE 3 CHRONIC KIDNEY DISEASE, WITHOUT LONG-TERM CURRENT USE OF INSULIN (HCC): ICD-10-CM

## 2022-03-14 DIAGNOSIS — E11.22 CONTROLLED TYPE 2 DIABETES MELLITUS WITH STAGE 3 CHRONIC KIDNEY DISEASE, WITHOUT LONG-TERM CURRENT USE OF INSULIN (HCC): ICD-10-CM

## 2022-03-14 LAB
EST. AVERAGE GLUCOSE BLD GHB EST-MCNC: 131 MG/DL
HBA1C MFR BLD: 6.2 % (ref 4–5.6)

## 2022-03-14 PROCEDURE — 80061 LIPID PANEL: CPT

## 2022-03-14 PROCEDURE — 83036 HEMOGLOBIN GLYCOSYLATED A1C: CPT | Mod: GA

## 2022-03-14 PROCEDURE — 80053 COMPREHEN METABOLIC PANEL: CPT

## 2022-03-14 PROCEDURE — 36415 COLL VENOUS BLD VENIPUNCTURE: CPT

## 2022-03-15 ENCOUNTER — HOSPITAL ENCOUNTER (OUTPATIENT)
Facility: MEDICAL CENTER | Age: 77
End: 2022-03-15
Attending: FAMILY MEDICINE
Payer: MEDICARE

## 2022-03-15 LAB
ALBUMIN SERPL BCP-MCNC: 4.4 G/DL (ref 3.2–4.9)
ALBUMIN/GLOB SERPL: 2.1 G/DL
ALP SERPL-CCNC: 85 U/L (ref 30–99)
ALT SERPL-CCNC: 15 U/L (ref 2–50)
ANION GAP SERPL CALC-SCNC: 13 MMOL/L (ref 7–16)
AST SERPL-CCNC: 20 U/L (ref 12–45)
BILIRUB SERPL-MCNC: 0.5 MG/DL (ref 0.1–1.5)
BUN SERPL-MCNC: 24 MG/DL (ref 8–22)
CALCIUM SERPL-MCNC: 9.2 MG/DL (ref 8.5–10.5)
CHLORIDE SERPL-SCNC: 111 MMOL/L (ref 96–112)
CHOLEST SERPL-MCNC: 103 MG/DL (ref 100–199)
CO2 SERPL-SCNC: 17 MMOL/L (ref 20–33)
CREAT SERPL-MCNC: 2.11 MG/DL (ref 0.5–1.4)
CREAT UR-MCNC: 71.06 MG/DL
FASTING STATUS PATIENT QL REPORTED: NORMAL
GFR SERPLBLD CREATININE-BSD FMLA CKD-EPI: 31 ML/MIN/1.73 M 2
GLOBULIN SER CALC-MCNC: 2.1 G/DL (ref 1.9–3.5)
GLUCOSE SERPL-MCNC: 101 MG/DL (ref 65–99)
HDLC SERPL-MCNC: 40 MG/DL
LDLC SERPL CALC-MCNC: 43 MG/DL
MICROALBUMIN UR-MCNC: <1.2 MG/DL
MICROALBUMIN/CREAT UR: NORMAL MG/G (ref 0–30)
POTASSIUM SERPL-SCNC: 4.6 MMOL/L (ref 3.6–5.5)
PROT SERPL-MCNC: 6.5 G/DL (ref 6–8.2)
SODIUM SERPL-SCNC: 141 MMOL/L (ref 135–145)
TRIGL SERPL-MCNC: 102 MG/DL (ref 0–149)

## 2022-03-15 PROCEDURE — 82043 UR ALBUMIN QUANTITATIVE: CPT

## 2022-03-15 PROCEDURE — 82570 ASSAY OF URINE CREATININE: CPT

## 2022-03-18 ENCOUNTER — OFFICE VISIT (OUTPATIENT)
Dept: INTERNAL MEDICINE | Facility: IMAGING CENTER | Age: 77
End: 2022-03-18
Payer: MEDICARE

## 2022-03-18 VITALS
SYSTOLIC BLOOD PRESSURE: 132 MMHG | BODY MASS INDEX: 22.9 KG/M2 | WEIGHT: 160 LBS | RESPIRATION RATE: 12 BRPM | DIASTOLIC BLOOD PRESSURE: 68 MMHG | OXYGEN SATURATION: 98 % | HEIGHT: 70 IN | HEART RATE: 65 BPM | TEMPERATURE: 97.8 F

## 2022-03-18 DIAGNOSIS — M18.0 PRIMARY OSTEOARTHRITIS OF BOTH FIRST CARPOMETACARPAL JOINTS: ICD-10-CM

## 2022-03-18 DIAGNOSIS — E11.22 CONTROLLED TYPE 2 DIABETES MELLITUS WITH STAGE 3 CHRONIC KIDNEY DISEASE, WITHOUT LONG-TERM CURRENT USE OF INSULIN (HCC): ICD-10-CM

## 2022-03-18 DIAGNOSIS — N18.30 CONTROLLED TYPE 2 DIABETES MELLITUS WITH STAGE 3 CHRONIC KIDNEY DISEASE, WITHOUT LONG-TERM CURRENT USE OF INSULIN (HCC): ICD-10-CM

## 2022-03-18 DIAGNOSIS — N18.32 STAGE 3B CHRONIC KIDNEY DISEASE: ICD-10-CM

## 2022-03-18 DIAGNOSIS — E78.2 MIXED HYPERLIPIDEMIA: ICD-10-CM

## 2022-03-18 PROCEDURE — 99214 OFFICE O/P EST MOD 30 MIN: CPT | Performed by: FAMILY MEDICINE

## 2022-03-18 RX ORDER — DOXYCYCLINE HYCLATE 100 MG
TABLET ORAL
COMMUNITY
End: 2022-03-18

## 2022-03-18 RX ORDER — GLIMEPIRIDE 2 MG/1
2 TABLET ORAL EVERY MORNING
Qty: 90 TABLET | Refills: 3 | Status: SHIPPED | OUTPATIENT
Start: 2022-03-18 | End: 2023-03-15 | Stop reason: SDUPTHER

## 2022-03-18 ASSESSMENT — FIBROSIS 4 INDEX: FIB4 SCORE: 2.52

## 2022-03-18 ASSESSMENT — PATIENT HEALTH QUESTIONNAIRE - PHQ9: CLINICAL INTERPRETATION OF PHQ2 SCORE: 0

## 2022-03-18 NOTE — PROGRESS NOTES
CC: Diagnoses of Controlled type 2 diabetes mellitus with stage 3 chronic kidney disease, without long-term current use of insulin (HCC), Stage 3b chronic kidney disease (HCC), Mixed hyperlipidemia, and Primary osteoarthritis of both first carpometacarpal joints were pertinent to this visit.                                                                                                                                         HPI:   Rai presents today with the following concerns:    1. Controlled type 2 diabetes mellitus with stage 3 chronic kidney disease, without long-term current use of insulin (HCC)  Chronic problem well controlled with current medication.      2. Stage 3b chronic kidney disease (HCC)  Chronic health problem stable but dangerously close to Stg 4.    3. Mixed hyperlipidemia  Chronic health problem well controlled with current meds and lifestyle.    4. Primary osteoarthritis of both first carpometacarpal joints  Chronic health problem well controlled and doesn't need to return to orthopedics unless worsens.       Patient Active Problem List    Diagnosis Date Noted   • Osteoarthritis of carpometacarpal (CMC) joint of thumb 02/26/2021   • Benign prostatic hyperplasia 10/02/2020   • Bilateral hand pain 02/22/2018   • Other specified hypothyroidism 02/22/2018   • Bilateral hearing loss 08/17/2017   • Chronic kidney disease (CKD), stage III (moderate) ([facility]) 10/06/2016   • Recurrent nephrolithiasis 05/13/2015   • Vitamin D deficiency disease 08/28/2014   • Mixed hyperlipidemia 12/01/2010   • Controlled type 2 diabetes mellitus with chronic kidney disease (HCC) 12/01/2010       Current Outpatient Medications   Medication Sig Dispense Refill   • glimepiride (AMARYL) 2 MG Tab Take 1 Tablet by mouth every morning. 90 Tablet 3   • simvastatin (ZOCOR) 40 MG Tab TAKE ONE TABLET BY MOUTH IN THE EVENING 90 Tablet 3   • levothyroxine (SYNTHROID) 50 MCG Tab Take 1 Tablet by mouth every morning before  "breakfast. TAKE ONE TABLET BY MOUTH EVERY MORNING ON EMPTY STOMACH 90 Tablet 3   • fenofibrate (TRICOR) 48 MG Tab Take 1 tablet by mouth every day. 90 tablet 3   • Diclofenac Sodium 1 % Gel Apply 2 g to skin as directed 4 times a day as needed (pain). 150 g 3   • CONTOUR NEXT TEST strip USE ONCE A DAY AS NEEDED FOR HIGH OR LOW SUGAR 100 Strip 3   • aspirin 81 MG tablet Take 1 Tab by mouth every day. 100 Tab 3   • vitamin D (CHOLECALCIFEROL) 1000 UNIT Tab Take 1,000-2,000 Units by mouth 2 Times a Day. 1 tab in the am, 2 tabs in the pm       No current facility-administered medications for this visit.         Allergies as of 03/18/2022 - Reviewed 03/18/2022   Allergen Reaction Noted   • Ace inhibitors Cough 08/24/2011   • Metformin Diarrhea 05/17/2018            /68 (BP Location: Right arm, Patient Position: Sitting, BP Cuff Size: Adult)   Pulse 65   Temp 36.6 °C (97.8 °F) (Temporal)   Resp 12   Ht 1.778 m (5' 10\")   Wt 72.6 kg (160 lb)   SpO2 98%   BMI 22.96 kg/m²     Physical Exam:  Gen:         Alert and oriented, No apparent distress.  Neck:        No Lymphadenopathy or Bruits.  Lungs:     Clear to auscultation bilaterally  CV:          Regular rate and rhythm. No murmurs, rubs or gallops.               Ext:          No clubbing, cyanosis, edema.    LABS: 3/15/22: Results reviewed and discussed with the patient, questions answered.      Assessment and Plan.   77 y.o. male with the following issues:    Controlled type 2 diabetes mellitus with chronic kidney disease (HCC)  This is a chronic health problem that is well controlled on a lesser dose of Glimepiride 2 mg daily.  He has been checking his BS daily and running easily between .  On the higher dose he did have BS lows.    Mixed hyperlipidemia  This is a chronic health problem that is well controlled on the Simvastatin 40 mg daily.  Current labs are good at total cholesterol 103, triglycerides good at 102, HDL good at 40 LDL good at 43.  He " will continue with current meds.  There is no liver dysfunction.    Osteoarthritis of carpometacarpal (CMC) joint of thumb  This is a chronic health problem for which he was getting shots into the joints with steroids.  He just saw them 3 weeks ago and he did not need injections.     Chronic kidney disease (CKD), stage III (moderate)  This is a chronic health problem that is stable but just on the verge of needing to see a nephrologist.  His GFR is now 31

## 2022-03-18 NOTE — ASSESSMENT & PLAN NOTE
This is a chronic health problem for which he was getting shots into the joints with steroids.  He just saw them 3 weeks ago and he did not need injections.

## 2022-03-18 NOTE — ASSESSMENT & PLAN NOTE
This is a chronic health problem that is stable but just on the verge of needing to see a nephrologist.  His GFR is now 31

## 2022-03-18 NOTE — ASSESSMENT & PLAN NOTE
This is a chronic health problem that is well controlled on a lesser dose of Glimepiride 2 mg daily.  He has been checking his BS daily and running easily between .  On the higher dose he did have BS lows.

## 2022-03-18 NOTE — ASSESSMENT & PLAN NOTE
This is a chronic health problem that is well controlled on the Simvastatin 40 mg daily.  Current labs are good at total cholesterol 103, triglycerides good at 102, HDL good at 40 LDL good at 43.  He will continue with current meds.  There is no liver dysfunction.

## 2022-07-27 RX ORDER — FENOFIBRATE 48 MG/1
48 TABLET, COATED ORAL
Qty: 90 TABLET | Refills: 3 | Status: SHIPPED | OUTPATIENT
Start: 2022-07-27 | End: 2023-07-25 | Stop reason: SDUPTHER

## 2022-08-03 RX ORDER — LEVOTHYROXINE SODIUM 0.05 MG/1
TABLET ORAL
Qty: 90 TABLET | Refills: 3 | Status: SHIPPED | OUTPATIENT
Start: 2022-08-03 | End: 2023-08-14 | Stop reason: SDUPTHER

## 2022-09-14 ENCOUNTER — HOSPITAL ENCOUNTER (OUTPATIENT)
Dept: LAB | Facility: MEDICAL CENTER | Age: 77
End: 2022-09-14
Attending: FAMILY MEDICINE
Payer: MEDICARE

## 2022-09-14 DIAGNOSIS — E78.2 MIXED HYPERLIPIDEMIA: ICD-10-CM

## 2022-09-14 DIAGNOSIS — N18.30 CONTROLLED TYPE 2 DIABETES MELLITUS WITH STAGE 3 CHRONIC KIDNEY DISEASE, WITHOUT LONG-TERM CURRENT USE OF INSULIN (HCC): ICD-10-CM

## 2022-09-14 DIAGNOSIS — E11.22 CONTROLLED TYPE 2 DIABETES MELLITUS WITH STAGE 3 CHRONIC KIDNEY DISEASE, WITHOUT LONG-TERM CURRENT USE OF INSULIN (HCC): ICD-10-CM

## 2022-09-14 LAB
ALBUMIN SERPL BCP-MCNC: 4.5 G/DL (ref 3.2–4.9)
ALBUMIN/GLOB SERPL: 1.9 G/DL
ALP SERPL-CCNC: 70 U/L (ref 30–99)
ALT SERPL-CCNC: 12 U/L (ref 2–50)
ANION GAP SERPL CALC-SCNC: 13 MMOL/L (ref 7–16)
AST SERPL-CCNC: 19 U/L (ref 12–45)
BILIRUB SERPL-MCNC: 0.6 MG/DL (ref 0.1–1.5)
BUN SERPL-MCNC: 19 MG/DL (ref 8–22)
CALCIUM SERPL-MCNC: 9.1 MG/DL (ref 8.5–10.5)
CHLORIDE SERPL-SCNC: 114 MMOL/L (ref 96–112)
CHOLEST SERPL-MCNC: 94 MG/DL (ref 100–199)
CO2 SERPL-SCNC: 18 MMOL/L (ref 20–33)
CREAT SERPL-MCNC: 2.22 MG/DL (ref 0.5–1.4)
EST. AVERAGE GLUCOSE BLD GHB EST-MCNC: 111 MG/DL
FASTING STATUS PATIENT QL REPORTED: NORMAL
GFR SERPLBLD CREATININE-BSD FMLA CKD-EPI: 29 ML/MIN/1.73 M 2
GLOBULIN SER CALC-MCNC: 2.4 G/DL (ref 1.9–3.5)
GLUCOSE SERPL-MCNC: 68 MG/DL (ref 65–99)
HBA1C MFR BLD: 5.5 % (ref 4–5.6)
HDLC SERPL-MCNC: 36 MG/DL
LDLC SERPL CALC-MCNC: 40 MG/DL
POTASSIUM SERPL-SCNC: 4.1 MMOL/L (ref 3.6–5.5)
PROT SERPL-MCNC: 6.9 G/DL (ref 6–8.2)
SODIUM SERPL-SCNC: 145 MMOL/L (ref 135–145)
TRIGL SERPL-MCNC: 91 MG/DL (ref 0–149)

## 2022-09-14 PROCEDURE — 83036 HEMOGLOBIN GLYCOSYLATED A1C: CPT | Mod: GA

## 2022-09-14 PROCEDURE — 36415 COLL VENOUS BLD VENIPUNCTURE: CPT | Mod: GA

## 2022-09-14 PROCEDURE — 80061 LIPID PANEL: CPT

## 2022-09-14 PROCEDURE — 80053 COMPREHEN METABOLIC PANEL: CPT

## 2022-09-16 ENCOUNTER — OFFICE VISIT (OUTPATIENT)
Dept: INTERNAL MEDICINE | Facility: IMAGING CENTER | Age: 77
End: 2022-09-16
Payer: MEDICARE

## 2022-09-16 VITALS
DIASTOLIC BLOOD PRESSURE: 72 MMHG | HEIGHT: 70 IN | HEART RATE: 63 BPM | BODY MASS INDEX: 22.9 KG/M2 | RESPIRATION RATE: 16 BRPM | WEIGHT: 160 LBS | TEMPERATURE: 97.8 F | SYSTOLIC BLOOD PRESSURE: 126 MMHG | OXYGEN SATURATION: 99 %

## 2022-09-16 DIAGNOSIS — N18.4 CKD (CHRONIC KIDNEY DISEASE) STAGE 4, GFR 15-29 ML/MIN (HCC): ICD-10-CM

## 2022-09-16 DIAGNOSIS — E78.2 MIXED HYPERLIPIDEMIA: ICD-10-CM

## 2022-09-16 DIAGNOSIS — K59.1 FUNCTIONAL DIARRHEA: ICD-10-CM

## 2022-09-16 DIAGNOSIS — E78.01 FAMILIAL HYPERCHOLESTEROLEMIA: ICD-10-CM

## 2022-09-16 DIAGNOSIS — N18.4 CONTROLLED TYPE 2 DIABETES MELLITUS WITH STAGE 4 CHRONIC KIDNEY DISEASE, WITHOUT LONG-TERM CURRENT USE OF INSULIN (HCC): ICD-10-CM

## 2022-09-16 DIAGNOSIS — E11.22 CONTROLLED TYPE 2 DIABETES MELLITUS WITH STAGE 4 CHRONIC KIDNEY DISEASE, WITHOUT LONG-TERM CURRENT USE OF INSULIN (HCC): ICD-10-CM

## 2022-09-16 PROCEDURE — 99214 OFFICE O/P EST MOD 30 MIN: CPT | Performed by: FAMILY MEDICINE

## 2022-09-16 RX ORDER — SIMVASTATIN 40 MG
TABLET ORAL
Qty: 90 TABLET | Refills: 3 | Status: SHIPPED | OUTPATIENT
Start: 2022-09-16 | End: 2023-10-03 | Stop reason: SDUPTHER

## 2022-09-16 NOTE — ASSESSMENT & PLAN NOTE
This is a chronic health problem for this patient for which she is on both simvastatin 40 mg daily and fenofibrate 48 mg daily.  I am going to decrease his simvastatin because his total cholesterol dropped to 94, triglycerides 91, HDL 36 and LDL is 40.  I do not think he needs to be on as strong a dose of simvastatin as he is currently.

## 2022-09-16 NOTE — ASSESSMENT & PLAN NOTE
This is a chronic health problem that is doing very well.  Patient and I reviewed his blood sugars from his readings and he has not had a blood sugar above 100 since the end of June.  His blood sugars are typically between 80 and 100.  His A1c is excellent at 5.5.  We will not make any adjustments in his diabetes meds.    Diabetic Foot Exam: No ulcers or skin lesions present, patient tested with a 10 g force and is sensitive bilaterally throughout the ball of the foot, great toe and heel.  Dorsalis pedis and posterior tibial pulses are present and intact bilaterally

## 2022-09-16 NOTE — ASSESSMENT & PLAN NOTE
This is a chronic problem that has recently worsened to where he has dropped into stage IV chronic kidney disease which is severe.  He had been hovering at the low 30s for a while.  I am going to set him up to see Dr. Najjar as soon as he can be worked into the schedule.

## 2022-09-16 NOTE — ASSESSMENT & PLAN NOTE
This is a chronic problem that is been going on for years and the patient never told me about it but today has decided to reveal that he typically will get a large amount of gas that he does not necessarily knows he has until its passed.  Then he will realize he no longer feels bloated.  He also will get diarrheal stools that sometimes become orange and almost look like candle wax.  Patient denies bright red blood per rectum or tarry stool in association with this.

## 2022-09-16 NOTE — PROGRESS NOTES
CC: Diagnoses of CKD (chronic kidney disease) stage 4, GFR 15-29 ml/min (HCC), Functional diarrhea, Mixed hyperlipidemia, Familial hypercholesterolemia, and Controlled type 2 diabetes mellitus with stage 4 chronic kidney disease, without long-term current use of insulin (HCC) were pertinent to this visit.                                                                                                                                         HPI:   Rai presents today with the following concerns:    1. CKD (chronic kidney disease) stage 4, GFR 15-29 ml/min (HCC)  This is a chronic problem for this patient that is worsening and he is now dropped from stage III to stage IV.  I have placed a referral for nephrology.    2. Functional diarrhea  This is a chronic problem that the patient's never told me about until just today.  He definitely has to be seen by gastroenterology.  I am worried that his chronic diarrhea is contributing to his chronic kidney disease.    3. Mixed hyperlipidemia  Chronic health problem well-controlled with current meds.  I am going to lower his dose of simvastatin from 40 to 20 mg daily because his numbers are so good.    4. Familial hypercholesterolemia  Chronic health problem plan as per #3 above    5. Controlled type 2 diabetes mellitus with stage 4 chronic kidney disease, without long-term current use of insulin (HCC)  Chronic health problem well-controlled with current medications and lifestyle.  Patient is very diligent about checking his blood sugar on a daily basis and typically is staying less than 100 regularly.       Patient Active Problem List    Diagnosis Date Noted    Functional diarrhea 09/16/2022    Osteoarthritis of carpometacarpal (CMC) joint of thumb 02/26/2021    Benign prostatic hyperplasia 10/02/2020    Bilateral hand pain 02/22/2018    Other specified hypothyroidism 02/22/2018    Bilateral hearing loss 08/17/2017    CKD (chronic kidney disease) stage 4, GFR 15-29 ml/min  "([facility]) 10/06/2016    Recurrent nephrolithiasis 05/13/2015    Vitamin D deficiency disease 08/28/2014    Mixed hyperlipidemia 12/01/2010    Controlled type 2 diabetes mellitus with chronic kidney disease ([facility]) 12/01/2010       Current Outpatient Medications   Medication Sig Dispense Refill    simvastatin (ZOCOR) 40 MG Tab 1/2 tab daily 90 Tablet 3    levothyroxine (SYNTHROID) 50 MCG Tab TAKE ONE TABLET BY MOUTH EVERY MORNING ON AN EMPTY STOMACH 90 Tablet 3    fenofibrate (TRICOR) 48 MG Tab Take 1 Tablet by mouth every day. 90 Tablet 3    glimepiride (AMARYL) 2 MG Tab Take 1 Tablet by mouth every morning. 90 Tablet 3    Diclofenac Sodium 1 % Gel Apply 2 g to skin as directed 4 times a day as needed (pain). 150 g 3    CONTOUR NEXT TEST strip USE ONCE A DAY AS NEEDED FOR HIGH OR LOW SUGAR 100 Strip 3    aspirin 81 MG tablet Take 1 Tab by mouth every day. 100 Tab 3    vitamin D (CHOLECALCIFEROL) 1000 UNIT Tab Take 1,000-2,000 Units by mouth 2 Times a Day. 1 tab in the am, 2 tabs in the pm       No current facility-administered medications for this visit.         Allergies as of 09/16/2022 - Reviewed 09/16/2022   Allergen Reaction Noted    Ace inhibitors Cough 08/24/2011    Metformin Diarrhea 05/17/2018            /72 (BP Location: Left arm, Patient Position: Sitting, BP Cuff Size: Adult)   Pulse 63   Temp 36.6 °C (97.8 °F) (Temporal)   Resp 16   Ht 1.778 m (5' 10\")   Wt 72.6 kg (160 lb)   SpO2 99%   BMI 22.96 kg/m²     Physical Exam:  Gen:         Alert and oriented, No apparent distress.  Neck:        No Lymphadenopathy or Bruits.  Lungs:     Clear to auscultation bilaterally  CV:          Regular rate and rhythm. No murmurs, rubs or gallops.               Ext:          No clubbing, cyanosis, edema.    LABS: 9/14/2022: Results reviewed and discussed with the patient, questions answered.      Assessment and Plan.   77 y.o. male with the following issues:    CKD (chronic kidney disease) stage 4, GFR 15-29 " ml/min (HCC)  This is a chronic problem that has recently worsened to where he has dropped into stage IV chronic kidney disease which is severe.  He had been hovering at the low 30s for a while.  I am going to set him up to see Dr. Najjar as soon as he can be worked into the schedule.    Functional diarrhea  This is a chronic problem that is been going on for years and the patient never told me about it but today has decided to reveal that he typically will get a large amount of gas that he does not necessarily knows he has until its passed.  Then he will realize he no longer feels bloated.  He also will get diarrheal stools that sometimes become orange and almost look like candle wax.  Patient denies bright red blood per rectum or tarry stool in association with this.    Mixed hyperlipidemia  This is a chronic health problem for this patient for which she is on both simvastatin 40 mg daily and fenofibrate 48 mg daily.  I am going to decrease his simvastatin because his total cholesterol dropped to 94, triglycerides 91, HDL 36 and LDL is 40.  I do not think he needs to be on as strong a dose of simvastatin as he is currently.    Controlled type 2 diabetes mellitus with chronic kidney disease (HCC)  This is a chronic health problem that is doing very well.  Patient and I reviewed his blood sugars from his readings and he has not had a blood sugar above 100 since the end of June.  His blood sugars are typically between 80 and 100.  His A1c is excellent at 5.5.  We will not make any adjustments in his diabetes meds.    Diabetic Foot Exam: No ulcers or skin lesions present, patient tested with a 10 g force and is sensitive bilaterally throughout the ball of the foot, great toe and heel.  Dorsalis pedis and posterior tibial pulses are present and intact bilaterally

## 2022-09-27 ENCOUNTER — OFFICE VISIT (OUTPATIENT)
Dept: NEPHROLOGY | Facility: MEDICAL CENTER | Age: 77
End: 2022-09-27
Payer: MEDICARE

## 2022-09-27 VITALS
SYSTOLIC BLOOD PRESSURE: 134 MMHG | HEART RATE: 61 BPM | TEMPERATURE: 98 F | OXYGEN SATURATION: 99 % | BODY MASS INDEX: 23.97 KG/M2 | HEIGHT: 68 IN | WEIGHT: 158.13 LBS | DIASTOLIC BLOOD PRESSURE: 68 MMHG

## 2022-09-27 DIAGNOSIS — E87.20 METABOLIC ACIDOSIS: ICD-10-CM

## 2022-09-27 DIAGNOSIS — N20.0 NEPHROLITHIASIS: ICD-10-CM

## 2022-09-27 DIAGNOSIS — N40.0 BENIGN PROSTATIC HYPERPLASIA WITHOUT LOWER URINARY TRACT SYMPTOMS: ICD-10-CM

## 2022-09-27 DIAGNOSIS — E11.22 CONTROLLED TYPE 2 DIABETES MELLITUS WITH STAGE 4 CHRONIC KIDNEY DISEASE, WITHOUT LONG-TERM CURRENT USE OF INSULIN (HCC): ICD-10-CM

## 2022-09-27 DIAGNOSIS — N18.4 CKD (CHRONIC KIDNEY DISEASE) STAGE 4, GFR 15-29 ML/MIN (HCC): ICD-10-CM

## 2022-09-27 DIAGNOSIS — N18.4 CONTROLLED TYPE 2 DIABETES MELLITUS WITH STAGE 4 CHRONIC KIDNEY DISEASE, WITHOUT LONG-TERM CURRENT USE OF INSULIN (HCC): ICD-10-CM

## 2022-09-27 PROCEDURE — 99204 OFFICE O/P NEW MOD 45 MIN: CPT | Performed by: INTERNAL MEDICINE

## 2022-09-27 RX ORDER — SODIUM BICARBONATE 650 MG/1
650 TABLET ORAL 2 TIMES DAILY
Qty: 180 TABLET | Refills: 3 | Status: SHIPPED | OUTPATIENT
Start: 2022-09-27 | End: 2023-09-30

## 2022-09-27 ASSESSMENT — ENCOUNTER SYMPTOMS
NAUSEA: 0
CHILLS: 0
FEVER: 0
SHORTNESS OF BREATH: 0
VOMITING: 0
COUGH: 0

## 2022-09-27 NOTE — PROGRESS NOTES
"Subjective     Rai Jhony Leija Jr. is a 77 y.o. male who presents with Chronic Kidney Disease            The patient is a pleasant 77-year-old gentleman with a past medical history significant for longstanding diabetes(under good control).    Chronic Kidney Disease  This is a chronic problem. The current episode started more than 1 year ago. The problem occurs constantly. The problem has been gradually worsening. Associated symptoms include urinary symptoms. Pertinent negatives include no chest pain, chills, coughing, fever, nausea or vomiting. Nothing aggravates the symptoms.     Review of Systems   Constitutional:  Negative for chills, fever and malaise/fatigue.   Respiratory:  Negative for cough and shortness of breath.    Cardiovascular:  Negative for chest pain and leg swelling.   Gastrointestinal:  Negative for nausea and vomiting.   Genitourinary:  Negative for dysuria, frequency and urgency.        Nocturia   All other systems reviewed and are negative.           Objective     /68 (BP Location: Right arm, Patient Position: Sitting, BP Cuff Size: Adult)   Pulse 61   Temp 36.7 °C (98 °F) (Temporal)   Ht 1.727 m (5' 8\")   Wt 71.7 kg (158 lb 2 oz)   SpO2 99%   BMI 24.04 kg/m²      Physical Exam  Vitals and nursing note reviewed.   Constitutional:       General: He is awake.      Appearance: He is not ill-appearing.   HENT:      Head: Normocephalic and atraumatic.      Right Ear: External ear normal.      Left Ear: External ear normal.      Nose: Nose normal.      Mouth/Throat:      Pharynx: No oropharyngeal exudate or posterior oropharyngeal erythema.   Eyes:      General:         Right eye: No discharge.         Left eye: No discharge.      Conjunctiva/sclera: Conjunctivae normal.   Cardiovascular:      Rate and Rhythm: Normal rate and regular rhythm.   Pulmonary:      Effort: Pulmonary effort is normal. No respiratory distress.      Breath sounds: Normal breath sounds. No wheezing.   Abdominal: "      General: Abdomen is flat. Bowel sounds are normal.   Musculoskeletal:         General: No tenderness.      Cervical back: No rigidity. No muscular tenderness.      Right lower leg: No edema.      Left lower leg: No edema.   Skin:     General: Skin is warm and dry.      Coloration: Skin is not jaundiced.      Findings: No lesion or rash.   Neurological:      General: No focal deficit present.      Mental Status: He is alert and oriented to person, place, and time. Mental status is at baseline.   Psychiatric:         Mood and Affect: Mood normal.         Behavior: Behavior normal.         Thought Content: Thought content normal.     Past Medical History:   Diagnosis Date    CKD (chronic kidney disease) stage 4, GFR 15-29 ml/min ([facility]) 10/6/2016    Diabetes mellitus type II     oral medications    Elevated BP 2010    Functional diarrhea 2022    Hearing decreased     seeing ENT    High cholesterol     Hyperlipidemia 2010    Hypertriglyceridemia 2010    Mixed hyperlipidemia 2010    Renal stone     sees Urology    Transaminitis 2011     Social History     Socioeconomic History    Marital status:      Spouse name: Not on file    Number of children: Not on file    Years of education: Not on file    Highest education level: Not on file   Occupational History    Occupation: Retired     Employer: OTHER   Tobacco Use    Smoking status: Former     Packs/day: 1.00     Years: 20.00     Pack years: 20.00     Types: Cigarettes     Quit date: 10/26/1986     Years since quittin.9    Smokeless tobacco: Never   Vaping Use    Vaping Use: Never used   Substance and Sexual Activity    Alcohol use: Not Currently     Alcohol/week: 0.0 oz     Comment: a few a year    Drug use: No    Sexual activity: Not Currently     Partners: Female     Comment: ,    Other Topics Concern     Service No    Blood Transfusions No    Caffeine Concern No    Occupational Exposure No    Hobby  Hazards No    Sleep Concern No    Stress Concern No    Weight Concern No    Special Diet No    Back Care No    Exercise Yes    Bike Helmet No    Seat Belt Yes    Self-Exams No   Social History Narrative    - no children     Social Determinants of Health     Financial Resource Strain: Not on file   Food Insecurity: Not on file   Transportation Needs: Not on file   Physical Activity: Not on file   Stress: Not on file   Social Connections: Not on file   Intimate Partner Violence: Not on file   Housing Stability: Not on file     Family History   Problem Relation Age of Onset    GI Disease Father         polyps    No Known Problems Mother      Recent Labs     03/14/22  0654 09/14/22  1203   ALBUMIN 4.4 4.5   HDL 40 36*   TRIGLYCERIDE 102 91   SODIUM 141 145   POTASSIUM 4.6 4.1   CHLORIDE 111 114*   CO2 17* 18*   BUN 24* 19   CREATININE 2.11* 2.22*                           Assessment & Plan        1. CKD (chronic kidney disease) stage 4, GFR 15-29 ml/min ([facility])  Etiology is not very clear, I believe patient has chronic kidney disease from normal wear-and-tear/aging process, however I would like to rule out bladder outlet obstruction as patient has minor symptoms including nocturia  I doubt diabetic nephropathy as no significant proteinuria  Check renal ultrasound  Repeat labs after hydration  Avoid nephrotoxins like NSAIDs  Renal dose on medication    2. Controlled type 2 diabetes mellitus with stage 4 chronic kidney disease, without long-term current use of insulin ([facility])  Continue to optimize diabetes control    3. Nephrolithiasis  Patient was advised to increase his water intake  Low-sodium diet  - US-RENAL; Future    4. Benign prostatic hyperplasia   Patient has nocturia    - US-RENAL; Future    5. Metabolic acidosis  Start sodium bicarbonate

## 2023-01-18 ENCOUNTER — APPOINTMENT (OUTPATIENT)
Dept: NEPHROLOGY | Facility: MEDICAL CENTER | Age: 78
End: 2023-01-18
Payer: MEDICARE

## 2023-02-15 ENCOUNTER — APPOINTMENT (OUTPATIENT)
Dept: NEPHROLOGY | Facility: MEDICAL CENTER | Age: 78
End: 2023-02-15
Payer: MEDICARE

## 2023-03-16 RX ORDER — GLIMEPIRIDE 2 MG/1
2 TABLET ORAL EVERY MORNING
Qty: 90 TABLET | Refills: 3 | Status: SHIPPED | OUTPATIENT
Start: 2023-03-16 | End: 2024-03-13 | Stop reason: SDUPTHER

## 2023-04-11 ENCOUNTER — APPOINTMENT (OUTPATIENT)
Dept: NEPHROLOGY | Facility: MEDICAL CENTER | Age: 78
End: 2023-04-11
Payer: MEDICARE

## 2023-04-17 ENCOUNTER — HOSPITAL ENCOUNTER (OUTPATIENT)
Dept: LAB | Facility: MEDICAL CENTER | Age: 78
End: 2023-04-17
Attending: INTERNAL MEDICINE
Payer: MEDICARE

## 2023-04-17 DIAGNOSIS — N20.0 NEPHROLITHIASIS: ICD-10-CM

## 2023-04-17 DIAGNOSIS — E11.22 CONTROLLED TYPE 2 DIABETES MELLITUS WITH STAGE 4 CHRONIC KIDNEY DISEASE, WITHOUT LONG-TERM CURRENT USE OF INSULIN (HCC): ICD-10-CM

## 2023-04-17 DIAGNOSIS — N18.4 CKD (CHRONIC KIDNEY DISEASE) STAGE 4, GFR 15-29 ML/MIN (HCC): ICD-10-CM

## 2023-04-17 DIAGNOSIS — N18.4 CONTROLLED TYPE 2 DIABETES MELLITUS WITH STAGE 4 CHRONIC KIDNEY DISEASE, WITHOUT LONG-TERM CURRENT USE OF INSULIN (HCC): ICD-10-CM

## 2023-04-17 LAB
ANION GAP SERPL CALC-SCNC: 12 MMOL/L (ref 7–16)
BUN SERPL-MCNC: 21 MG/DL (ref 8–22)
CALCIUM SERPL-MCNC: 8.9 MG/DL (ref 8.5–10.5)
CHLORIDE SERPL-SCNC: 112 MMOL/L (ref 96–112)
CO2 SERPL-SCNC: 19 MMOL/L (ref 20–33)
CREAT SERPL-MCNC: 2.34 MG/DL (ref 0.5–1.4)
GFR SERPLBLD CREATININE-BSD FMLA CKD-EPI: 27 ML/MIN/1.73 M 2
GLUCOSE SERPL-MCNC: 95 MG/DL (ref 65–99)
POTASSIUM SERPL-SCNC: 3.8 MMOL/L (ref 3.6–5.5)
SODIUM SERPL-SCNC: 143 MMOL/L (ref 135–145)

## 2023-04-17 PROCEDURE — 36415 COLL VENOUS BLD VENIPUNCTURE: CPT

## 2023-04-17 PROCEDURE — 80048 BASIC METABOLIC PNL TOTAL CA: CPT

## 2023-05-01 ENCOUNTER — HOSPITAL ENCOUNTER (OUTPATIENT)
Dept: RADIOLOGY | Facility: MEDICAL CENTER | Age: 78
End: 2023-05-01
Attending: INTERNAL MEDICINE
Payer: MEDICARE

## 2023-05-01 DIAGNOSIS — N40.0 BENIGN PROSTATIC HYPERPLASIA WITHOUT LOWER URINARY TRACT SYMPTOMS: ICD-10-CM

## 2023-05-01 DIAGNOSIS — E11.22 CONTROLLED TYPE 2 DIABETES MELLITUS WITH STAGE 4 CHRONIC KIDNEY DISEASE, WITHOUT LONG-TERM CURRENT USE OF INSULIN (HCC): ICD-10-CM

## 2023-05-01 DIAGNOSIS — N20.0 NEPHROLITHIASIS: ICD-10-CM

## 2023-05-01 DIAGNOSIS — N18.4 CONTROLLED TYPE 2 DIABETES MELLITUS WITH STAGE 4 CHRONIC KIDNEY DISEASE, WITHOUT LONG-TERM CURRENT USE OF INSULIN (HCC): ICD-10-CM

## 2023-05-01 DIAGNOSIS — N18.4 CKD (CHRONIC KIDNEY DISEASE) STAGE 4, GFR 15-29 ML/MIN (HCC): ICD-10-CM

## 2023-05-01 PROCEDURE — 76775 US EXAM ABDO BACK WALL LIM: CPT

## 2023-05-10 ENCOUNTER — OFFICE VISIT (OUTPATIENT)
Dept: NEPHROLOGY | Facility: MEDICAL CENTER | Age: 78
End: 2023-05-10
Payer: MEDICARE

## 2023-05-10 VITALS
BODY MASS INDEX: 24.2 KG/M2 | HEART RATE: 64 BPM | OXYGEN SATURATION: 98 % | HEIGHT: 70 IN | WEIGHT: 169 LBS | SYSTOLIC BLOOD PRESSURE: 132 MMHG | DIASTOLIC BLOOD PRESSURE: 82 MMHG | RESPIRATION RATE: 18 BRPM | TEMPERATURE: 98 F

## 2023-05-10 DIAGNOSIS — N18.4 CONTROLLED TYPE 2 DIABETES MELLITUS WITH STAGE 4 CHRONIC KIDNEY DISEASE, WITHOUT LONG-TERM CURRENT USE OF INSULIN (HCC): ICD-10-CM

## 2023-05-10 DIAGNOSIS — N20.0 NEPHROLITHIASIS: ICD-10-CM

## 2023-05-10 DIAGNOSIS — G89.29 CHRONIC RIGHT-SIDED BACK PAIN, UNSPECIFIED BACK LOCATION: ICD-10-CM

## 2023-05-10 DIAGNOSIS — N18.4 CKD (CHRONIC KIDNEY DISEASE) STAGE 4, GFR 15-29 ML/MIN (HCC): ICD-10-CM

## 2023-05-10 DIAGNOSIS — E11.22 CONTROLLED TYPE 2 DIABETES MELLITUS WITH STAGE 4 CHRONIC KIDNEY DISEASE, WITHOUT LONG-TERM CURRENT USE OF INSULIN (HCC): ICD-10-CM

## 2023-05-10 DIAGNOSIS — E87.20 METABOLIC ACIDOSIS: ICD-10-CM

## 2023-05-10 DIAGNOSIS — M54.9 CHRONIC RIGHT-SIDED BACK PAIN, UNSPECIFIED BACK LOCATION: ICD-10-CM

## 2023-05-10 DIAGNOSIS — N40.0 BENIGN PROSTATIC HYPERPLASIA WITHOUT LOWER URINARY TRACT SYMPTOMS: ICD-10-CM

## 2023-05-10 PROCEDURE — 99214 OFFICE O/P EST MOD 30 MIN: CPT | Performed by: INTERNAL MEDICINE

## 2023-05-10 ASSESSMENT — ENCOUNTER SYMPTOMS
COUGH: 0
FEVER: 0
SHORTNESS OF BREATH: 0
CHILLS: 0
NAUSEA: 0
VOMITING: 0
BACK PAIN: 1

## 2023-05-10 NOTE — PROGRESS NOTES
"Subjective     Rai Jhony Leija Jr. is a 78 y.o. male who presents with Chronic Kidney Disease            Patient has a history of nephrolithiasis, he is complaining of occasional right side back pain, sounds more like muscular.  Patient has no hematuria, no dysuria.  No recent use of NSAIDs or IV contrast exposure.  He had renal ultrasound on May 1, 2023 which I reviewed the images myself with the patient, there is nonobstructing kidney stone, no hydronephrosis    Chronic Kidney Disease  This is a chronic problem. The current episode started more than 1 year ago. The problem occurs constantly. The problem has been unchanged. Pertinent negatives include no chest pain, chills, coughing, fever, nausea, urinary symptoms or vomiting.       Review of Systems   Constitutional:  Negative for chills, fever and malaise/fatigue.   Respiratory:  Negative for cough and shortness of breath.    Cardiovascular:  Negative for chest pain and leg swelling.   Gastrointestinal:  Negative for nausea and vomiting.   Genitourinary:  Negative for dysuria, frequency and urgency.   Musculoskeletal:  Positive for back pain.              Objective     /82 (BP Location: Right arm, Patient Position: Sitting, BP Cuff Size: Adult)   Pulse 64   Temp 36.7 °C (98 °F) (Temporal)   Resp 18   Ht 1.778 m (5' 10\")   Wt 76.7 kg (169 lb)   SpO2 98%   BMI 24.25 kg/m²      Physical Exam  Vitals and nursing note reviewed.   Constitutional:       General: He is not in acute distress.     Appearance: He is not ill-appearing.   HENT:      Head: Normocephalic and atraumatic.      Right Ear: External ear normal.      Left Ear: External ear normal.      Nose: Nose normal.   Eyes:      General:         Right eye: No discharge.         Left eye: No discharge.      Conjunctiva/sclera: Conjunctivae normal.   Cardiovascular:      Rate and Rhythm: Normal rate and regular rhythm.      Heart sounds: No murmur heard.  Pulmonary:      Effort: Pulmonary effort " is normal. No respiratory distress.      Breath sounds: Normal breath sounds. No wheezing.   Musculoskeletal:         General: No tenderness or deformity.      Right lower leg: No edema.      Left lower leg: No edema.   Skin:     General: Skin is warm.   Neurological:      General: No focal deficit present.      Mental Status: He is alert and oriented to person, place, and time.   Psychiatric:         Mood and Affect: Mood normal.         Behavior: Behavior normal.       Past Medical History:   Diagnosis Date    CKD (chronic kidney disease) stage 4, GFR 15-29 ml/min ([facility]) 10/6/2016    Diabetes mellitus type II     oral medications    Elevated BP 2010    Functional diarrhea 2022    Hearing decreased     seeing ENT    High cholesterol     Hyperlipidemia 2010    Hypertriglyceridemia 2010    Mixed hyperlipidemia 2010    Renal stone     sees Urology    Transaminitis 2011     Social History     Socioeconomic History    Marital status:      Spouse name: Not on file    Number of children: Not on file    Years of education: Not on file    Highest education level: Not on file   Occupational History    Occupation: Retired     Employer: OTHER   Tobacco Use    Smoking status: Former     Packs/day: 1.00     Years: 20.00     Pack years: 20.00     Types: Cigarettes     Quit date: 10/26/1986     Years since quittin.5    Smokeless tobacco: Never   Vaping Use    Vaping Use: Never used   Substance and Sexual Activity    Alcohol use: Not Currently     Alcohol/week: 0.0 oz     Comment: a few a year    Drug use: No    Sexual activity: Not Currently     Partners: Female     Comment: ,    Other Topics Concern     Service No    Blood Transfusions No    Caffeine Concern No    Occupational Exposure No    Hobby Hazards No    Sleep Concern No    Stress Concern No    Weight Concern No    Special Diet No    Back Care No    Exercise Yes    Bike Helmet No    Seat Belt Yes    Self-Exams  No   Social History Narrative    - no children     Social Determinants of Health     Financial Resource Strain: Not on file   Food Insecurity: Not on file   Transportation Needs: Not on file   Physical Activity: Not on file   Stress: Not on file   Social Connections: Not on file   Intimate Partner Violence: Not on file   Housing Stability: Not on file     Family History   Problem Relation Age of Onset    GI Disease Father         polyps    No Known Problems Mother      Recent Labs     09/14/22  1203 04/17/23  0652   ALBUMIN 4.5  --    HDL 36*  --    TRIGLYCERIDE 91  --    SODIUM 145 143   POTASSIUM 4.1 3.8   CHLORIDE 114* 112   CO2 18* 19*   BUN 19 21   CREATININE 2.22* 2.34*                             Assessment & Plan        1. CKD (chronic kidney disease) stage 4, GFR 15-29 ml/min ([facility])  Stable  No uremic symptoms  Renal dose of medication  Avoid nephrotoxins  Continue same medication regimen  Patient was advised to call us if symptoms worsen      2. Controlled type 2 diabetes mellitus with stage 4 chronic kidney disease, without long-term current use of insulin ([facility])      3. Nephrolithiasis  Low-sodium diet  Water intake 80 to 100 ounces per 24 hours    4. Benign prostatic hyperplasia without lower urinary tract symptoms    5. Metabolic acidosis  Continue sodium bicarbonate    6. Chronic right-sided back pain, unspecified back location  Most likely muscular in origin  Patient was advised to do some stretching exercises also uses heat pads  Avoid nephrotoxins like NSAIDs

## 2023-06-09 DIAGNOSIS — E03.8 OTHER SPECIFIED HYPOTHYROIDISM: ICD-10-CM

## 2023-06-09 DIAGNOSIS — N18.4 CONTROLLED TYPE 2 DIABETES MELLITUS WITH STAGE 4 CHRONIC KIDNEY DISEASE, WITHOUT LONG-TERM CURRENT USE OF INSULIN (HCC): ICD-10-CM

## 2023-06-09 DIAGNOSIS — E11.22 CONTROLLED TYPE 2 DIABETES MELLITUS WITH STAGE 4 CHRONIC KIDNEY DISEASE, WITHOUT LONG-TERM CURRENT USE OF INSULIN (HCC): ICD-10-CM

## 2023-06-09 DIAGNOSIS — E78.2 MIXED HYPERLIPIDEMIA: ICD-10-CM

## 2023-06-15 ENCOUNTER — HOSPITAL ENCOUNTER (OUTPATIENT)
Dept: LAB | Facility: MEDICAL CENTER | Age: 78
End: 2023-06-15
Attending: FAMILY MEDICINE
Payer: MEDICARE

## 2023-06-15 DIAGNOSIS — N18.4 CONTROLLED TYPE 2 DIABETES MELLITUS WITH STAGE 4 CHRONIC KIDNEY DISEASE, WITHOUT LONG-TERM CURRENT USE OF INSULIN (HCC): ICD-10-CM

## 2023-06-15 DIAGNOSIS — E11.22 CONTROLLED TYPE 2 DIABETES MELLITUS WITH STAGE 4 CHRONIC KIDNEY DISEASE, WITHOUT LONG-TERM CURRENT USE OF INSULIN (HCC): ICD-10-CM

## 2023-06-15 DIAGNOSIS — E78.2 MIXED HYPERLIPIDEMIA: ICD-10-CM

## 2023-06-15 DIAGNOSIS — E03.8 OTHER SPECIFIED HYPOTHYROIDISM: ICD-10-CM

## 2023-06-15 LAB
ALBUMIN SERPL BCP-MCNC: 4.4 G/DL (ref 3.2–4.9)
ALBUMIN/GLOB SERPL: 1.8 G/DL
ALP SERPL-CCNC: 75 U/L (ref 30–99)
ALT SERPL-CCNC: 17 U/L (ref 2–50)
ANION GAP SERPL CALC-SCNC: 14 MMOL/L (ref 7–16)
AST SERPL-CCNC: 24 U/L (ref 12–45)
BILIRUB SERPL-MCNC: 0.5 MG/DL (ref 0.1–1.5)
BUN SERPL-MCNC: 24 MG/DL (ref 8–22)
CALCIUM ALBUM COR SERPL-MCNC: 8.8 MG/DL (ref 8.5–10.5)
CALCIUM SERPL-MCNC: 9.1 MG/DL (ref 8.5–10.5)
CHLORIDE SERPL-SCNC: 109 MMOL/L (ref 96–112)
CHOLEST SERPL-MCNC: 98 MG/DL (ref 100–199)
CO2 SERPL-SCNC: 19 MMOL/L (ref 20–33)
CREAT SERPL-MCNC: 2.32 MG/DL (ref 0.5–1.4)
FASTING STATUS PATIENT QL REPORTED: NORMAL
GFR SERPLBLD CREATININE-BSD FMLA CKD-EPI: 28 ML/MIN/1.73 M 2
GLOBULIN SER CALC-MCNC: 2.5 G/DL (ref 1.9–3.5)
GLUCOSE SERPL-MCNC: 84 MG/DL (ref 65–99)
HDLC SERPL-MCNC: 33 MG/DL
LDLC SERPL CALC-MCNC: 42 MG/DL
POTASSIUM SERPL-SCNC: 4.2 MMOL/L (ref 3.6–5.5)
PROT SERPL-MCNC: 6.9 G/DL (ref 6–8.2)
SODIUM SERPL-SCNC: 142 MMOL/L (ref 135–145)
TRIGL SERPL-MCNC: 117 MG/DL (ref 0–149)
TSH SERPL DL<=0.005 MIU/L-ACNC: 4.55 UIU/ML (ref 0.38–5.33)

## 2023-06-15 PROCEDURE — 80053 COMPREHEN METABOLIC PANEL: CPT

## 2023-06-15 PROCEDURE — 83036 HEMOGLOBIN GLYCOSYLATED A1C: CPT | Mod: GA

## 2023-06-15 PROCEDURE — 80061 LIPID PANEL: CPT

## 2023-06-15 PROCEDURE — 36415 COLL VENOUS BLD VENIPUNCTURE: CPT

## 2023-06-15 PROCEDURE — 84443 ASSAY THYROID STIM HORMONE: CPT

## 2023-06-16 LAB
EST. AVERAGE GLUCOSE BLD GHB EST-MCNC: 128 MG/DL
HBA1C MFR BLD: 6.1 % (ref 4–5.6)

## 2023-06-23 ENCOUNTER — OFFICE VISIT (OUTPATIENT)
Dept: INTERNAL MEDICINE | Facility: IMAGING CENTER | Age: 78
End: 2023-06-23
Payer: MEDICARE

## 2023-06-23 VITALS
DIASTOLIC BLOOD PRESSURE: 72 MMHG | OXYGEN SATURATION: 97 % | TEMPERATURE: 98.2 F | HEIGHT: 70 IN | HEART RATE: 62 BPM | BODY MASS INDEX: 24.2 KG/M2 | SYSTOLIC BLOOD PRESSURE: 136 MMHG | RESPIRATION RATE: 12 BRPM | WEIGHT: 169 LBS

## 2023-06-23 DIAGNOSIS — E78.2 MIXED HYPERLIPIDEMIA: ICD-10-CM

## 2023-06-23 DIAGNOSIS — N18.4 CKD (CHRONIC KIDNEY DISEASE) STAGE 4, GFR 15-29 ML/MIN (HCC): ICD-10-CM

## 2023-06-23 DIAGNOSIS — N18.4 CONTROLLED TYPE 2 DIABETES MELLITUS WITH STAGE 4 CHRONIC KIDNEY DISEASE, WITHOUT LONG-TERM CURRENT USE OF INSULIN (HCC): ICD-10-CM

## 2023-06-23 DIAGNOSIS — E11.22 CONTROLLED TYPE 2 DIABETES MELLITUS WITH STAGE 4 CHRONIC KIDNEY DISEASE, WITHOUT LONG-TERM CURRENT USE OF INSULIN (HCC): ICD-10-CM

## 2023-06-23 DIAGNOSIS — J40 BRONCHITIS: ICD-10-CM

## 2023-06-23 PROCEDURE — 99214 OFFICE O/P EST MOD 30 MIN: CPT | Performed by: FAMILY MEDICINE

## 2023-06-23 PROCEDURE — 3078F DIAST BP <80 MM HG: CPT | Performed by: FAMILY MEDICINE

## 2023-06-23 PROCEDURE — 3075F SYST BP GE 130 - 139MM HG: CPT | Performed by: FAMILY MEDICINE

## 2023-06-23 RX ORDER — AZITHROMYCIN 250 MG/1
TABLET, FILM COATED ORAL
Qty: 6 TABLET | Refills: 0 | Status: SHIPPED | OUTPATIENT
Start: 2023-06-23

## 2023-06-23 ASSESSMENT — PATIENT HEALTH QUESTIONNAIRE - PHQ9: CLINICAL INTERPRETATION OF PHQ2 SCORE: 0

## 2023-06-23 NOTE — PROGRESS NOTES
CC: Diagnoses of Bronchitis, Controlled type 2 diabetes mellitus with stage 4 chronic kidney disease, without long-term current use of insulin ([facility]), Mixed hyperlipidemia, and CKD (chronic kidney disease) stage 4, GFR 15-29 ml/min ([facility]) were pertinent to this visit.    Subjective                                                                                                                                       HPI:   Rai presents today with the following concerns:    1. Bronchitis  Acute bronchitis not adequately controlled with over-the-counter measures.  We will treat with this had Zithromax    2. Controlled type 2 diabetes mellitus with stage 4 chronic kidney disease, without long-term current use of insulin ([facility])  Chronic health problem, well controlled, continue with current meds and lifestyle.      3. Mixed hyperlipidemia  Chronic health problem, well controlled, continue with current meds and lifestyle.      4. CKD (chronic kidney disease) stage 4, GFR 15-29 ml/min ([facility])  Chronic health problem currently stable       Patient Active Problem List    Diagnosis Date Noted    Bronchitis 06/23/2023    Functional diarrhea 09/16/2022    Osteoarthritis of carpometacarpal (CMC) joint of thumb 02/26/2021    Benign prostatic hyperplasia 10/02/2020    Bilateral hand pain 02/22/2018    Other specified hypothyroidism 02/22/2018    Bilateral hearing loss 08/17/2017    CKD (chronic kidney disease) stage 4, GFR 15-29 ml/min ([facility]) 10/06/2016    Recurrent nephrolithiasis 05/13/2015    Vitamin D deficiency disease 08/28/2014    Mixed hyperlipidemia 12/01/2010    Controlled type 2 diabetes mellitus with chronic kidney disease ([facility]) 12/01/2010       Current Outpatient Medications   Medication Sig Dispense Refill    azithromycin (ZITHROMAX) 250 MG Tab 2 tablets on day 1 then 1 tablet every day after till gone 6 Tablet 0    glimepiride (AMARYL) 2 MG Tab Take 1 Tablet by mouth every morning. 90 Tablet 3    sodium bicarbonate  "(SODIUM BICARBONATE) 650 MG Tab Take 1 Tablet by mouth 2 times a day. 180 Tablet 3    simvastatin (ZOCOR) 40 MG Tab 1/2 tab daily 90 Tablet 3    levothyroxine (SYNTHROID) 50 MCG Tab TAKE ONE TABLET BY MOUTH EVERY MORNING ON AN EMPTY STOMACH 90 Tablet 3    fenofibrate (TRICOR) 48 MG Tab Take 1 Tablet by mouth every day. 90 Tablet 3    Diclofenac Sodium 1 % Gel Apply 2 g to skin as directed 4 times a day as needed (pain). 150 g 3    CONTOUR NEXT TEST strip USE ONCE A DAY AS NEEDED FOR HIGH OR LOW SUGAR 100 Strip 3    aspirin 81 MG tablet Take 1 Tab by mouth every day. 100 Tab 3    vitamin D (CHOLECALCIFEROL) 1000 UNIT Tab Take 1,000-2,000 Units by mouth 2 Times a Day. 1 tab in the am, 2 tabs in the pm       No current facility-administered medications for this visit.         Allergies as of 06/23/2023 - Reviewed 06/23/2023   Allergen Reaction Noted    Ace inhibitors Cough 08/24/2011    Metformin Diarrhea 05/17/2018          Objective      /72 (BP Location: Left arm, Patient Position: Sitting, BP Cuff Size: Adult)   Pulse 62   Temp 36.8 °C (98.2 °F) (Temporal)   Resp 12   Ht 1.778 m (5' 10\")   Wt 76.7 kg (169 lb)   SpO2 97%   BMI 24.25 kg/m²     Physical Exam:  Gen:         Alert and oriented, No apparent distress.  Neck:        No Lymphadenopathy or Bruits.  Lungs:     Left lower lobe with rhonchi, no rales or wheezes noted.  Clear to percussion.  CV:          Regular rate and rhythm. No murmurs, rubs or gallops.               Ext:          No clubbing, cyanosis, edema.    LABS: 6/15/2023: Results reviewed and discussed with the patient, questions answered.      Assessment & Plan    78 y.o. male with the following issues:    Bronchitis  This is an acute problem that started approximately 5-6 days ago.  It started initially with just a scratchy sore throat but is now dropped down into his chest.  On physical exam he has rhonchi in the left lower lobe.  He has not had fevers or chills and does not have a " fever here in the office.  He has not noted any shortness of breath or dyspnea on exertion but has been coughing up colored sputum.  I am going to put him on a round of antibiotics in light of his diabetes and the acute illness.  We will treat with Zithromax 2 tablets on day 1 then 1 tablet on days 2-5, 250 mg tabs.    Controlled type 2 diabetes mellitus with chronic kidney disease ([facility])  This is a chronic health problem that is well controlled on his Amaryl 2 mg every morning.  His fasting glucose is normal at 84 and his A1c is down to 6.1.  He is done an excellent job keeping his diabetes under control.  He did have a retinal eye exam within the last month and they have identified that he has cataracts that need to be removed.  He will pursue that in the coming month.    Mixed hyperlipidemia  This is a chronic health problem for which the patient is on simvastatin 40 mg daily.  His total cholesterol is down to 98, triglycerides good at 117, HDL good at 33 his LDL is good at 42.  He will continue with current meds.  There is no liver dysfunction.    CKD (chronic kidney disease) stage 4, GFR 15-29 ml/min ([facility])  This is a chronic health problem that is stable, his most recent GFR came back at 28 where 2 months ago it was at 27.  With his diabetes under excellent control and his blood pressure well controlled we are really doing all we can to try and keep his kidney function stable.

## 2023-06-23 NOTE — ASSESSMENT & PLAN NOTE
This is a chronic health problem that is well controlled on his Amaryl 2 mg every morning.  His fasting glucose is normal at 84 and his A1c is down to 6.1.  He is done an excellent job keeping his diabetes under control.  He did have a retinal eye exam within the last month and they have identified that he has cataracts that need to be removed.  He will pursue that in the coming month.

## 2023-06-23 NOTE — ASSESSMENT & PLAN NOTE
This is an acute problem that started approximately 5-6 days ago.  It started initially with just a scratchy sore throat but is now dropped down into his chest.  On physical exam he has rhonchi in the left lower lobe.  He has not had fevers or chills and does not have a fever here in the office.  He has not noted any shortness of breath or dyspnea on exertion but has been coughing up colored sputum.  I am going to put him on a round of antibiotics in light of his diabetes and the acute illness.  We will treat with Zithromax 2 tablets on day 1 then 1 tablet on days 2-5, 250 mg tabs.

## 2023-06-23 NOTE — ASSESSMENT & PLAN NOTE
This is a chronic health problem for which the patient is on simvastatin 40 mg daily.  His total cholesterol is down to 98, triglycerides good at 117, HDL good at 33 his LDL is good at 42.  He will continue with current meds.  There is no liver dysfunction.

## 2023-06-23 NOTE — ASSESSMENT & PLAN NOTE
This is a chronic health problem that is stable, his most recent GFR came back at 28 where 2 months ago it was at 27.  With his diabetes under excellent control and his blood pressure well controlled we are really doing all we can to try and keep his kidney function stable.

## 2023-07-25 RX ORDER — FENOFIBRATE 48 MG/1
48 TABLET, COATED ORAL
Qty: 90 TABLET | Refills: 3 | Status: SHIPPED | OUTPATIENT
Start: 2023-07-25 | End: 2023-08-16 | Stop reason: SDUPTHER

## 2023-08-14 RX ORDER — LEVOTHYROXINE SODIUM 0.05 MG/1
50 TABLET ORAL
Qty: 100 TABLET | Refills: 3 | Status: SHIPPED | OUTPATIENT
Start: 2023-08-14

## 2023-08-16 NOTE — TELEPHONE ENCOUNTER
Received request via: Pharmacy    Was the patient seen in the last year in this department? Yes    Does the patient have an active prescription (recently filled or refills available) for medication(s) requested? No    Does the patient have [facility] Plus and need 100 day supply (blood pressure, diabetes and cholesterol meds only)? Patient does not have SCP   oral

## 2023-08-17 RX ORDER — FENOFIBRATE 48 MG/1
48 TABLET, COATED ORAL
Qty: 100 TABLET | Refills: 3 | Status: SHIPPED | OUTPATIENT
Start: 2023-08-17

## 2023-09-30 RX ORDER — SODIUM BICARBONATE 650 MG/1
650 TABLET ORAL 2 TIMES DAILY
Qty: 180 TABLET | Refills: 3 | Status: SHIPPED | OUTPATIENT
Start: 2023-09-30

## 2023-10-02 DIAGNOSIS — E78.01 FAMILIAL HYPERCHOLESTEROLEMIA: ICD-10-CM

## 2023-10-03 DIAGNOSIS — E78.01 FAMILIAL HYPERCHOLESTEROLEMIA: ICD-10-CM

## 2023-10-03 RX ORDER — SIMVASTATIN 40 MG
TABLET ORAL
Qty: 45 TABLET | Refills: 3 | Status: SHIPPED | OUTPATIENT
Start: 2023-10-03

## 2023-10-03 RX ORDER — SIMVASTATIN 40 MG
TABLET ORAL
Qty: 90 TABLET | Refills: 3 | OUTPATIENT
Start: 2023-10-03

## 2023-11-03 ENCOUNTER — HOSPITAL ENCOUNTER (OUTPATIENT)
Dept: LAB | Facility: MEDICAL CENTER | Age: 78
End: 2023-11-03
Attending: INTERNAL MEDICINE
Payer: MEDICARE

## 2023-11-03 DIAGNOSIS — N18.4 CKD (CHRONIC KIDNEY DISEASE) STAGE 4, GFR 15-29 ML/MIN (HCC): ICD-10-CM

## 2023-11-03 DIAGNOSIS — N18.4 CONTROLLED TYPE 2 DIABETES MELLITUS WITH STAGE 4 CHRONIC KIDNEY DISEASE, WITHOUT LONG-TERM CURRENT USE OF INSULIN (HCC): ICD-10-CM

## 2023-11-03 DIAGNOSIS — E11.22 CONTROLLED TYPE 2 DIABETES MELLITUS WITH STAGE 4 CHRONIC KIDNEY DISEASE, WITHOUT LONG-TERM CURRENT USE OF INSULIN (HCC): ICD-10-CM

## 2023-11-03 LAB
ANION GAP SERPL CALC-SCNC: 10 MMOL/L (ref 7–16)
BUN SERPL-MCNC: 22 MG/DL (ref 8–22)
CALCIUM SERPL-MCNC: 9.1 MG/DL (ref 8.5–10.5)
CHLORIDE SERPL-SCNC: 108 MMOL/L (ref 96–112)
CO2 SERPL-SCNC: 21 MMOL/L (ref 20–33)
CREAT SERPL-MCNC: 2.42 MG/DL (ref 0.5–1.4)
ERYTHROCYTE [DISTWIDTH] IN BLOOD BY AUTOMATED COUNT: 45.7 FL (ref 35.9–50)
GFR SERPLBLD CREATININE-BSD FMLA CKD-EPI: 26 ML/MIN/1.73 M 2
GLUCOSE SERPL-MCNC: 183 MG/DL (ref 65–99)
HCT VFR BLD AUTO: 37.1 % (ref 42–52)
HGB BLD-MCNC: 12.1 G/DL (ref 14–18)
MCH RBC QN AUTO: 30.5 PG (ref 27–33)
MCHC RBC AUTO-ENTMCNC: 32.6 G/DL (ref 32.3–36.5)
MCV RBC AUTO: 93.5 FL (ref 81.4–97.8)
PLATELET # BLD AUTO: 165 K/UL (ref 164–446)
PMV BLD AUTO: 11 FL (ref 9–12.9)
POTASSIUM SERPL-SCNC: 4.5 MMOL/L (ref 3.6–5.5)
RBC # BLD AUTO: 3.97 M/UL (ref 4.7–6.1)
SODIUM SERPL-SCNC: 139 MMOL/L (ref 135–145)
WBC # BLD AUTO: 8.1 K/UL (ref 4.8–10.8)

## 2023-11-03 PROCEDURE — 36415 COLL VENOUS BLD VENIPUNCTURE: CPT

## 2023-11-03 PROCEDURE — 80048 BASIC METABOLIC PNL TOTAL CA: CPT

## 2023-11-03 PROCEDURE — 85027 COMPLETE CBC AUTOMATED: CPT

## 2023-11-06 ENCOUNTER — HOSPITAL ENCOUNTER (OUTPATIENT)
Facility: MEDICAL CENTER | Age: 78
End: 2023-11-06
Attending: INTERNAL MEDICINE
Payer: MEDICARE

## 2023-11-06 LAB
CREAT UR-MCNC: 57.47 MG/DL
MICROALBUMIN UR-MCNC: 1.8 MG/DL
MICROALBUMIN/CREAT UR: 31 MG/G (ref 0–30)

## 2023-11-06 PROCEDURE — 82570 ASSAY OF URINE CREATININE: CPT

## 2023-11-06 PROCEDURE — 82043 UR ALBUMIN QUANTITATIVE: CPT

## 2023-11-07 ENCOUNTER — OFFICE VISIT (OUTPATIENT)
Dept: NEPHROLOGY | Facility: MEDICAL CENTER | Age: 78
End: 2023-11-07
Payer: MEDICARE

## 2023-11-07 VITALS
DIASTOLIC BLOOD PRESSURE: 72 MMHG | SYSTOLIC BLOOD PRESSURE: 135 MMHG | OXYGEN SATURATION: 97 % | BODY MASS INDEX: 23.85 KG/M2 | TEMPERATURE: 98.2 F | HEIGHT: 69 IN | WEIGHT: 161 LBS | HEART RATE: 69 BPM

## 2023-11-07 DIAGNOSIS — N18.4 CONTROLLED TYPE 2 DIABETES MELLITUS WITH STAGE 4 CHRONIC KIDNEY DISEASE, WITHOUT LONG-TERM CURRENT USE OF INSULIN (HCC): ICD-10-CM

## 2023-11-07 DIAGNOSIS — N40.1 BENIGN PROSTATIC HYPERPLASIA WITH NOCTURIA: ICD-10-CM

## 2023-11-07 DIAGNOSIS — E11.22 CONTROLLED TYPE 2 DIABETES MELLITUS WITH STAGE 4 CHRONIC KIDNEY DISEASE, WITHOUT LONG-TERM CURRENT USE OF INSULIN (HCC): ICD-10-CM

## 2023-11-07 DIAGNOSIS — N20.0 NEPHROLITHIASIS: ICD-10-CM

## 2023-11-07 DIAGNOSIS — N18.4 CKD (CHRONIC KIDNEY DISEASE) STAGE 4, GFR 15-29 ML/MIN (HCC): ICD-10-CM

## 2023-11-07 DIAGNOSIS — R35.1 BENIGN PROSTATIC HYPERPLASIA WITH NOCTURIA: ICD-10-CM

## 2023-11-07 PROCEDURE — 3078F DIAST BP <80 MM HG: CPT | Performed by: INTERNAL MEDICINE

## 2023-11-07 PROCEDURE — 99214 OFFICE O/P EST MOD 30 MIN: CPT | Performed by: INTERNAL MEDICINE

## 2023-11-07 PROCEDURE — 3075F SYST BP GE 130 - 139MM HG: CPT | Performed by: INTERNAL MEDICINE

## 2023-11-07 RX ORDER — TAMSULOSIN HYDROCHLORIDE 0.4 MG/1
0.4 CAPSULE ORAL DAILY
COMMUNITY
End: 2023-11-07 | Stop reason: SDUPTHER

## 2023-11-07 RX ORDER — TAMSULOSIN HYDROCHLORIDE 0.4 MG/1
0.4 CAPSULE ORAL DAILY
Qty: 90 CAPSULE | Refills: 3 | Status: SHIPPED | OUTPATIENT
Start: 2023-11-07

## 2023-11-07 ASSESSMENT — ENCOUNTER SYMPTOMS
CHILLS: 0
COUGH: 0
SHORTNESS OF BREATH: 0
FEVER: 0
VOMITING: 0
NAUSEA: 0

## 2023-11-07 ASSESSMENT — FIBROSIS 4 INDEX: FIB4 SCORE: 2.82

## 2023-11-07 NOTE — PROGRESS NOTES
"Subjective     Rai Jhony Leija Jr. is a 78 y.o. male who presents with Chronic Kidney Disease            Patient has a history of nephrolithiasis, he is under the care of urology [place]  He has no hematuria or dysuria.  No recent use of NSAIDs or IV contrast exposure.    Chronic Kidney Disease  This is a chronic problem. The current episode started more than 1 year ago. The problem occurs constantly. The problem has been unchanged. Associated symptoms include urinary symptoms. Pertinent negatives include no chest pain, chills, coughing, fever, nausea or vomiting. Nothing aggravates the symptoms.       Review of Systems   Constitutional:  Negative for chills, fever and malaise/fatigue.   Respiratory:  Negative for cough and shortness of breath.    Cardiovascular:  Negative for chest pain and leg swelling.   Gastrointestinal:  Negative for nausea and vomiting.   Genitourinary:  Negative for dysuria, frequency and urgency.        Nocturia              Objective     BP (!) 135/72 (BP Location: Right arm, Patient Position: Sitting)   Pulse 69   Temp 36.8 °C (98.2 °F)   Ht 1.753 m (5' 9\")   Wt 73 kg (161 lb)   SpO2 97%   BMI 23.78 kg/m²      Physical Exam  Vitals and nursing note reviewed.   Constitutional:       General: He is not in acute distress.     Appearance: He is not ill-appearing.   HENT:      Head: Normocephalic and atraumatic.      Right Ear: External ear normal.      Left Ear: External ear normal.      Nose: Nose normal.   Eyes:      General:         Right eye: No discharge.         Left eye: No discharge.      Conjunctiva/sclera: Conjunctivae normal.   Cardiovascular:      Rate and Rhythm: Normal rate and regular rhythm.      Heart sounds: No murmur heard.  Pulmonary:      Effort: Pulmonary effort is normal. No respiratory distress.      Breath sounds: Normal breath sounds. No wheezing.   Musculoskeletal:         General: No tenderness or deformity.      Right lower leg: No edema.      Left lower " leg: No edema.   Skin:     General: Skin is warm.   Neurological:      General: No focal deficit present.      Mental Status: He is alert and oriented to person, place, and time.   Psychiatric:         Mood and Affect: Mood normal.         Behavior: Behavior normal.       Past Medical History:   Diagnosis Date    Bronchitis 2023    CKD (chronic kidney disease) stage 4, GFR 15-29 ml/min ([facility]) 10/6/2016    Diabetes mellitus type II     oral medications    Elevated BP 2010    Functional diarrhea 2022    Hearing decreased     seeing ENT    High cholesterol     Hyperlipidemia 2010    Hypertriglyceridemia 2010    Mixed hyperlipidemia 2010    Renal stone     sees Urology    Transaminitis 2011     Social History     Socioeconomic History    Marital status:      Spouse name: Not on file    Number of children: Not on file    Years of education: Not on file    Highest education level: Not on file   Occupational History    Occupation: Retired     Employer: OTHER   Tobacco Use    Smoking status: Former     Current packs/day: 0.00     Average packs/day: 1 pack/day for 20.0 years (20.0 ttl pk-yrs)     Types: Cigarettes     Start date: 10/26/1966     Quit date: 10/26/1986     Years since quittin.0    Smokeless tobacco: Never   Vaping Use    Vaping Use: Never used   Substance and Sexual Activity    Alcohol use: Not Currently     Alcohol/week: 0.0 oz     Comment: a few a year    Drug use: No    Sexual activity: Not Currently     Partners: Female     Comment: ,    Other Topics Concern     Service No    Blood Transfusions No    Caffeine Concern No    Occupational Exposure No    Hobby Hazards No    Sleep Concern No    Stress Concern No    Weight Concern No    Special Diet No    Back Care No    Exercise Yes    Bike Helmet No    Seat Belt Yes    Self-Exams No   Social History Narrative    - no children     Social Determinants of Health     Financial Resource  Strain: Not on file   Food Insecurity: Not on file   Transportation Needs: Not on file   Physical Activity: Not on file   Stress: Not on file   Social Connections: Not on file   Intimate Partner Violence: Not on file   Housing Stability: Not on file     Family History   Problem Relation Age of Onset    GI Disease Father         polyps    No Known Problems Mother      Recent Labs     04/17/23  0652 06/15/23  0623 11/03/23  1152   ALBUMIN  --  4.4  --    HDL  --  33*  --    TRIGLYCERIDE  --  117  --    SODIUM 143 142 139   POTASSIUM 3.8 4.2 4.5   CHLORIDE 112 109 108   CO2 19* 19* 21   BUN 21 24* 22   CREATININE 2.34* 2.32* 2.42*                             Assessment & Plan        1. CKD (chronic kidney disease) stage 4, GFR 15-29 ml/min ([facility])  Stable  No uremic symptoms  Renal dose of medication  Avoid nephrotoxins  Continue same medication regimen  Patient was advised to call us if symptoms worsen      2. Controlled type 2 diabetes mellitus with stage 4 chronic kidney disease, without long-term current use of insulin ([facility])    3. Benign prostatic hyperplasia with nocturia  Start tamsulosin, patient was advised about the potential side effects  Follow-up with urology if no improvement    4. Nephrolithiasis  Patient was advised to be low-sodium diet  Increase water intake

## 2023-11-12 NOTE — ASSESSMENT & PLAN NOTE
This is a chronic health problem for this patient for which he is on simvastatin 40 mg daily.  His total cholesterol is down to 101, triglycerides 108, HDL excellent at 38 and the LDL is excellent at 41.  There is no liver dysfunction.  We will continue with that medication long-term.   Patent

## 2024-02-16 ENCOUNTER — PATIENT MESSAGE (OUTPATIENT)
Dept: HEALTH INFORMATION MANAGEMENT | Facility: OTHER | Age: 79
End: 2024-02-16

## 2024-03-14 RX ORDER — GLIMEPIRIDE 2 MG/1
2 TABLET ORAL EVERY MORNING
Qty: 90 TABLET | Refills: 3 | Status: SHIPPED | OUTPATIENT
Start: 2024-03-14

## 2024-03-29 DIAGNOSIS — E78.2 MIXED HYPERLIPIDEMIA: ICD-10-CM

## 2024-03-29 DIAGNOSIS — E11.22 CONTROLLED TYPE 2 DIABETES MELLITUS WITH CHRONIC KIDNEY DISEASE, WITHOUT LONG-TERM CURRENT USE OF INSULIN, UNSPECIFIED CKD STAGE (HCC): ICD-10-CM

## 2024-03-29 DIAGNOSIS — E03.8 OTHER SPECIFIED HYPOTHYROIDISM: ICD-10-CM

## 2024-04-03 ENCOUNTER — HOSPITAL ENCOUNTER (OUTPATIENT)
Dept: LAB | Facility: MEDICAL CENTER | Age: 79
End: 2024-04-03
Attending: FAMILY MEDICINE
Payer: MEDICARE

## 2024-04-03 DIAGNOSIS — E78.2 MIXED HYPERLIPIDEMIA: ICD-10-CM

## 2024-04-03 DIAGNOSIS — E11.22 CONTROLLED TYPE 2 DIABETES MELLITUS WITH CHRONIC KIDNEY DISEASE, WITHOUT LONG-TERM CURRENT USE OF INSULIN, UNSPECIFIED CKD STAGE (HCC): ICD-10-CM

## 2024-04-03 DIAGNOSIS — E03.8 OTHER SPECIFIED HYPOTHYROIDISM: ICD-10-CM

## 2024-04-03 LAB
ALBUMIN SERPL BCP-MCNC: 4.4 G/DL (ref 3.2–4.9)
ALBUMIN/GLOB SERPL: 1.7 G/DL
ALP SERPL-CCNC: 77 U/L (ref 30–99)
ALT SERPL-CCNC: 13 U/L (ref 2–50)
ANION GAP SERPL CALC-SCNC: 13 MMOL/L (ref 7–16)
AST SERPL-CCNC: 21 U/L (ref 12–45)
BILIRUB SERPL-MCNC: 0.4 MG/DL (ref 0.1–1.5)
BUN SERPL-MCNC: 23 MG/DL (ref 8–22)
CALCIUM ALBUM COR SERPL-MCNC: 8.8 MG/DL (ref 8.5–10.5)
CALCIUM SERPL-MCNC: 9.1 MG/DL (ref 8.5–10.5)
CHLORIDE SERPL-SCNC: 113 MMOL/L (ref 96–112)
CHOLEST SERPL-MCNC: 107 MG/DL (ref 100–199)
CO2 SERPL-SCNC: 19 MMOL/L (ref 20–33)
CREAT SERPL-MCNC: 2.2 MG/DL (ref 0.5–1.4)
ERYTHROCYTE [DISTWIDTH] IN BLOOD BY AUTOMATED COUNT: 46.3 FL (ref 35.9–50)
EST. AVERAGE GLUCOSE BLD GHB EST-MCNC: 117 MG/DL
GFR SERPLBLD CREATININE-BSD FMLA CKD-EPI: 29 ML/MIN/1.73 M 2
GLOBULIN SER CALC-MCNC: 2.6 G/DL (ref 1.9–3.5)
GLUCOSE SERPL-MCNC: 73 MG/DL (ref 65–99)
HBA1C MFR BLD: 5.7 % (ref 4–5.6)
HCT VFR BLD AUTO: 37.7 % (ref 42–52)
HDLC SERPL-MCNC: 42 MG/DL
HGB BLD-MCNC: 12.8 G/DL (ref 14–18)
LDLC SERPL CALC-MCNC: 52 MG/DL
MCH RBC QN AUTO: 31.3 PG (ref 27–33)
MCHC RBC AUTO-ENTMCNC: 34 G/DL (ref 32.3–36.5)
MCV RBC AUTO: 92.2 FL (ref 81.4–97.8)
PLATELET # BLD AUTO: 170 K/UL (ref 164–446)
PMV BLD AUTO: 10.8 FL (ref 9–12.9)
POTASSIUM SERPL-SCNC: 4.5 MMOL/L (ref 3.6–5.5)
PROT SERPL-MCNC: 7 G/DL (ref 6–8.2)
RBC # BLD AUTO: 4.09 M/UL (ref 4.7–6.1)
SODIUM SERPL-SCNC: 145 MMOL/L (ref 135–145)
TRIGL SERPL-MCNC: 63 MG/DL (ref 0–149)
TSH SERPL DL<=0.005 MIU/L-ACNC: 4.09 UIU/ML (ref 0.38–5.33)
WBC # BLD AUTO: 7.3 K/UL (ref 4.8–10.8)

## 2024-04-03 PROCEDURE — 83036 HEMOGLOBIN GLYCOSYLATED A1C: CPT | Mod: GA

## 2024-04-03 PROCEDURE — 80053 COMPREHEN METABOLIC PANEL: CPT

## 2024-04-03 PROCEDURE — 84443 ASSAY THYROID STIM HORMONE: CPT

## 2024-04-03 PROCEDURE — 36415 COLL VENOUS BLD VENIPUNCTURE: CPT

## 2024-04-03 PROCEDURE — 80061 LIPID PANEL: CPT

## 2024-04-03 PROCEDURE — 85027 COMPLETE CBC AUTOMATED: CPT

## 2024-04-05 ENCOUNTER — OFFICE VISIT (OUTPATIENT)
Dept: INTERNAL MEDICINE | Facility: IMAGING CENTER | Age: 79
End: 2024-04-05
Payer: MEDICARE

## 2024-04-05 VITALS
RESPIRATION RATE: 14 BRPM | WEIGHT: 167 LBS | HEART RATE: 66 BPM | TEMPERATURE: 96.9 F | HEIGHT: 70 IN | DIASTOLIC BLOOD PRESSURE: 72 MMHG | OXYGEN SATURATION: 96 % | BODY MASS INDEX: 23.91 KG/M2 | SYSTOLIC BLOOD PRESSURE: 138 MMHG

## 2024-04-05 DIAGNOSIS — E78.2 MIXED HYPERLIPIDEMIA: ICD-10-CM

## 2024-04-05 DIAGNOSIS — N18.4 CKD (CHRONIC KIDNEY DISEASE) STAGE 4, GFR 15-29 ML/MIN (HCC): ICD-10-CM

## 2024-04-05 DIAGNOSIS — E11.22 CONTROLLED TYPE 2 DIABETES MELLITUS WITH CHRONIC KIDNEY DISEASE, WITHOUT LONG-TERM CURRENT USE OF INSULIN, UNSPECIFIED CKD STAGE (HCC): ICD-10-CM

## 2024-04-05 DIAGNOSIS — D63.8 ANEMIA OF CHRONIC DISEASE: ICD-10-CM

## 2024-04-05 PROCEDURE — 3075F SYST BP GE 130 - 139MM HG: CPT | Performed by: FAMILY MEDICINE

## 2024-04-05 PROCEDURE — 3078F DIAST BP <80 MM HG: CPT | Performed by: FAMILY MEDICINE

## 2024-04-05 PROCEDURE — 99214 OFFICE O/P EST MOD 30 MIN: CPT | Performed by: FAMILY MEDICINE

## 2024-04-05 RX ORDER — GLIMEPIRIDE 1 MG/1
1 TABLET ORAL EVERY MORNING
Qty: 90 TABLET | Refills: 3 | Status: SHIPPED | OUTPATIENT
Start: 2024-04-05

## 2024-04-05 ASSESSMENT — PATIENT HEALTH QUESTIONNAIRE - PHQ9: CLINICAL INTERPRETATION OF PHQ2 SCORE: 0

## 2024-04-05 ASSESSMENT — FIBROSIS 4 INDEX: FIB4 SCORE: 2.78

## 2024-04-06 NOTE — PROGRESS NOTES
Verbal consent was acquired by the patient to use [employer] ambient listening note generation during this visit yes    Assessment & Plan:  Assessment & Plan  1. Hyperlipidemia.  This is a chronic health problem for this patient that is very well-controlled on simvastatin 20 mg daily.    2. Type 2 Diabetes Mellitus.  The patient's diabetes is currently well-managed. The dosage of glimepiride will be reduced to 1 mg because I believe this patient is overtreated for his diabetes.  I did like to see his A1c in the sixes rather down in the 5.    3. Chronic Kidney Disease, Stage 4.  The patient has an upcoming appointment with Dr. Najjar on 05/14/2024. His Glomerular Filtration Rate (GFR) has slightly increased from 26 to 29. His microalbumin/creatinine ratio is elevated at 31.    4. Anemia of Chronic Disease.  Given the patient's diabetes and chronic kidney disease, we are beginning to exhibit signs of anemia, a condition he has previously experienced. His hemoglobin level is 12.8, with the lower limit of normal being 14. His hematocrit level is 37.7, with the lower limit of 42. However, these numbers are consistent with those recorded in 11/2023. His white cell count is 7.3, with the normal range being 4.8 to 10.8.        Subjective:     HPI:   History of Present Illness  The patient is an 79-year-old male who presents for evaluation of chronic health problems.    The patient reports a decline in his health due to the recent loss of his brother, who was 76 years old yesterday. His brother, who had recently been diagnosed with leukemia, was receiving treatment for a year, but unfortunately, the patient experienced a syncopal episode at home, necessitating hospital admission. He presented with a fever of 103, which was successfully managed. Upon returning home, the patient's wife reported a cessation of heart function, a source of distress for the patient as he had hoped not to attend the previous appointment. The  "patient's emotional distress is further complicated by the demise of his brother.    The patient denies experiencing myalgia or ambulatory difficulties.    The patient experiences intermittent episodes of lightheadedness, although he is uncertain if perspiration is excessive. These episodes are infrequent. He has sought medical attention for these symptoms on a few occasions, during which he consumes food to alleviate the lightheadedness.    Health Maintenance: Completed    Objective:     Exam:  /72   Pulse 66   Temp 36.1 °C (96.9 °F)   Resp 14   Ht 1.778 m (5' 10\")   Wt 75.8 kg (167 lb)   SpO2 96%   BMI 23.96 kg/m²  Body mass index is 23.96 kg/m².    General:  Well nourished, well developed male in NAD  Head is grossly normal.  Neck: Supple without JVD or bruit. Thyroid is not enlarged.  Pulmonary: Clear to ausculation and percussion.  Normal effort. No rales, ronchi, or wheezing.  Cardiovascular: Regular rate and rhythm without murmur. Carotid and radial pulses are intact and equal bilaterally.  Extremities: no clubbing, cyanosis, or edema.    Results: Today we reviewed the lab work he had drawn on 4/3/2024 which included a comprehensive metabolic panel with a chloride that was elevated at 113 and a bicarb that was low at 19, glucose was normal at 73, BUN elevated at 23 and creatinine elevated at 2.2.  His CBC was as described above.              Assessment & Plan:     1. Mixed hyperlipidemia  Chronic health problem well-controlled    2. Controlled type 2 diabetes mellitus with chronic kidney disease, without long-term current use of insulin, unspecified CKD stage ([facility])  Chronic health problem very well-controlled I would like to loosen his diabetic control slightly  - Diabetic Monofilament Lower Extremity Exam  - HEMOGLOBIN A1C; Future  - Comp Metabolic Panel; Future    3. CKD (chronic kidney disease) stage 4, GFR 15-29 ml/min ([facility])  Chronic health problems stable    4. Anemia of chronic " disease  Chronic health problems stable            Please note that this dictation was created using voice recognition software. I have made every reasonable attempt to correct obvious errors, but I expect that there are errors of grammar and possibly content that I did not discover before finalizing the note.

## 2024-05-02 ENCOUNTER — HOSPITAL ENCOUNTER (OUTPATIENT)
Dept: LAB | Facility: MEDICAL CENTER | Age: 79
End: 2024-05-02
Attending: INTERNAL MEDICINE
Payer: MEDICARE

## 2024-05-02 DIAGNOSIS — N18.4 CONTROLLED TYPE 2 DIABETES MELLITUS WITH STAGE 4 CHRONIC KIDNEY DISEASE, WITHOUT LONG-TERM CURRENT USE OF INSULIN (HCC): ICD-10-CM

## 2024-05-02 DIAGNOSIS — N40.1 BENIGN PROSTATIC HYPERPLASIA WITH NOCTURIA: ICD-10-CM

## 2024-05-02 DIAGNOSIS — E11.22 CONTROLLED TYPE 2 DIABETES MELLITUS WITH STAGE 4 CHRONIC KIDNEY DISEASE, WITHOUT LONG-TERM CURRENT USE OF INSULIN (HCC): ICD-10-CM

## 2024-05-02 DIAGNOSIS — N18.4 CKD (CHRONIC KIDNEY DISEASE) STAGE 4, GFR 15-29 ML/MIN (HCC): ICD-10-CM

## 2024-05-02 DIAGNOSIS — R35.1 BENIGN PROSTATIC HYPERPLASIA WITH NOCTURIA: ICD-10-CM

## 2024-05-02 LAB
ANION GAP SERPL CALC-SCNC: 13 MMOL/L (ref 7–16)
BUN SERPL-MCNC: 33 MG/DL (ref 8–22)
CALCIUM SERPL-MCNC: 8.7 MG/DL (ref 8.5–10.5)
CHLORIDE SERPL-SCNC: 109 MMOL/L (ref 96–112)
CO2 SERPL-SCNC: 17 MMOL/L (ref 20–33)
CREAT SERPL-MCNC: 2.65 MG/DL (ref 0.5–1.4)
ERYTHROCYTE [DISTWIDTH] IN BLOOD BY AUTOMATED COUNT: 47.2 FL (ref 35.9–50)
GFR SERPLBLD CREATININE-BSD FMLA CKD-EPI: 23 ML/MIN/1.73 M 2
GLUCOSE SERPL-MCNC: 187 MG/DL (ref 65–99)
HCT VFR BLD AUTO: 36.6 % (ref 42–52)
HGB BLD-MCNC: 12 G/DL (ref 14–18)
MCH RBC QN AUTO: 30.9 PG (ref 27–33)
MCHC RBC AUTO-ENTMCNC: 32.8 G/DL (ref 32.3–36.5)
MCV RBC AUTO: 94.3 FL (ref 81.4–97.8)
PLATELET # BLD AUTO: 166 K/UL (ref 164–446)
PMV BLD AUTO: 10.8 FL (ref 9–12.9)
POTASSIUM SERPL-SCNC: 4.9 MMOL/L (ref 3.6–5.5)
RBC # BLD AUTO: 3.88 M/UL (ref 4.7–6.1)
SODIUM SERPL-SCNC: 139 MMOL/L (ref 135–145)
WBC # BLD AUTO: 6.9 K/UL (ref 4.8–10.8)

## 2024-05-14 ENCOUNTER — OFFICE VISIT (OUTPATIENT)
Dept: NEPHROLOGY | Facility: MEDICAL CENTER | Age: 79
End: 2024-05-14
Payer: MEDICARE

## 2024-05-14 VITALS
OXYGEN SATURATION: 98 % | DIASTOLIC BLOOD PRESSURE: 72 MMHG | HEIGHT: 70 IN | HEART RATE: 68 BPM | BODY MASS INDEX: 23.48 KG/M2 | WEIGHT: 164 LBS | SYSTOLIC BLOOD PRESSURE: 134 MMHG | TEMPERATURE: 97.8 F

## 2024-05-14 DIAGNOSIS — N20.0 NEPHROLITHIASIS: ICD-10-CM

## 2024-05-14 DIAGNOSIS — N18.4 CKD (CHRONIC KIDNEY DISEASE) STAGE 4, GFR 15-29 ML/MIN (HCC): ICD-10-CM

## 2024-05-14 DIAGNOSIS — E11.22 CONTROLLED TYPE 2 DIABETES MELLITUS WITH STAGE 4 CHRONIC KIDNEY DISEASE, WITHOUT LONG-TERM CURRENT USE OF INSULIN (HCC): ICD-10-CM

## 2024-05-14 DIAGNOSIS — N18.4 CONTROLLED TYPE 2 DIABETES MELLITUS WITH STAGE 4 CHRONIC KIDNEY DISEASE, WITHOUT LONG-TERM CURRENT USE OF INSULIN (HCC): ICD-10-CM

## 2024-05-14 PROCEDURE — 3078F DIAST BP <80 MM HG: CPT | Performed by: INTERNAL MEDICINE

## 2024-05-14 PROCEDURE — 3075F SYST BP GE 130 - 139MM HG: CPT | Performed by: INTERNAL MEDICINE

## 2024-05-14 PROCEDURE — 99214 OFFICE O/P EST MOD 30 MIN: CPT | Performed by: INTERNAL MEDICINE

## 2024-05-14 ASSESSMENT — ENCOUNTER SYMPTOMS
VOMITING: 0
CHILLS: 0
SHORTNESS OF BREATH: 0
NAUSEA: 0
FEVER: 0
COUGH: 0

## 2024-05-14 ASSESSMENT — FIBROSIS 4 INDEX: FIB4 SCORE: 2.84

## 2024-05-14 NOTE — PROGRESS NOTES
"Subjective     Raikarey Leija Jr. is a 79 y.o. male who presents with Chronic Kidney Disease            Chronic Kidney Disease  This is a chronic problem. The current episode started more than 1 year ago. The problem occurs constantly. The problem has been unchanged. Pertinent negatives include no chest pain, chills, coughing, fever, nausea, urinary symptoms or vomiting.       Review of Systems   Constitutional:  Negative for chills, fever and malaise/fatigue.   Respiratory:  Negative for cough and shortness of breath.    Cardiovascular:  Negative for chest pain and leg swelling.   Gastrointestinal:  Negative for nausea and vomiting.   Genitourinary:  Negative for dysuria, frequency and urgency.              Objective     /72 (BP Location: Right arm, Patient Position: Sitting, BP Cuff Size: Adult)   Pulse 68   Temp 36.6 °C (97.8 °F) (Temporal)   Ht 1.778 m (5' 10\")   Wt 74.4 kg (164 lb)   SpO2 98%   BMI 23.53 kg/m²      Physical Exam  Vitals and nursing note reviewed.   Constitutional:       General: He is not in acute distress.     Appearance: He is not ill-appearing.   HENT:      Head: Normocephalic and atraumatic.      Right Ear: External ear normal.      Left Ear: External ear normal.      Nose: Nose normal.   Eyes:      General:         Right eye: No discharge.         Left eye: No discharge.      Conjunctiva/sclera: Conjunctivae normal.   Cardiovascular:      Rate and Rhythm: Normal rate and regular rhythm.      Heart sounds: No murmur heard.  Pulmonary:      Effort: Pulmonary effort is normal. No respiratory distress.      Breath sounds: Normal breath sounds. No wheezing.   Musculoskeletal:         General: No tenderness or deformity.      Right lower leg: No edema.      Left lower leg: No edema.   Skin:     General: Skin is warm.   Neurological:      General: No focal deficit present.      Mental Status: He is alert and oriented to person, place, and time.   Psychiatric:         Mood and " Affect: Mood normal.         Behavior: Behavior normal.       Past Medical History:   Diagnosis Date    Anemia of chronic disease 2024    Bronchitis 2023    CKD (chronic kidney disease) stage 4, GFR 15-29 ml/min ([facility]) 10/06/2016    Diabetes mellitus type II     oral medications    Elevated BP 2010    Functional diarrhea 2022    Hearing decreased     seeing ENT    High cholesterol     Hyperlipidemia 2010    Hypertriglyceridemia 2010    Mixed hyperlipidemia 2010    Renal stone     sees Urology    Transaminitis 2011     Social History     Socioeconomic History    Marital status:      Spouse name: Not on file    Number of children: Not on file    Years of education: Not on file    Highest education level: Not on file   Occupational History    Occupation: Retired     Employer: OTHER   Tobacco Use    Smoking status: Former     Current packs/day: 0.00     Average packs/day: 1 pack/day for 20.0 years (20.0 ttl pk-yrs)     Types: Cigarettes     Start date: 10/26/1966     Quit date: 10/26/1986     Years since quittin.5    Smokeless tobacco: Never   Vaping Use    Vaping Use: Never used   Substance and Sexual Activity    Alcohol use: Not Currently     Alcohol/week: 0.0 oz     Comment: a few a year    Drug use: No    Sexual activity: Not Currently     Partners: Female     Comment: ,    Other Topics Concern     Service No    Blood Transfusions No    Caffeine Concern No    Occupational Exposure No    Hobby Hazards No    Sleep Concern No    Stress Concern No    Weight Concern No    Special Diet No    Back Care No    Exercise Yes    Bike Helmet No    Seat Belt Yes    Self-Exams No   Social History Narrative    - no children     Social Determinants of Health     Financial Resource Strain: Not on file   Food Insecurity: Not on file   Transportation Needs: Not on file   Physical Activity: Not on file   Stress: Not on file   Social Connections:  Not on file   Intimate Partner Violence: Not on file   Housing Stability: Not on file     Family History   Problem Relation Age of Onset    No Known Problems Mother     GI Disease Father         polyps    Heart Disease Brother     Cancer Brother 76        Leukemia     Recent Labs     06/15/23  0623 11/03/23  1152 04/03/24  0843 05/02/24  0838   ALBUMIN 4.4  --  4.4  --    HDL 33*  --  42  --    TRIGLYCERIDE 117  --  63  --    SODIUM 142 139 145 139   POTASSIUM 4.2 4.5 4.5 4.9   CHLORIDE 109 108 113* 109   CO2 19* 21 19* 17*   BUN 24* 22 23* 33*   CREATININE 2.32* 2.42* 2.20* 2.65*                             Assessment & Plan        1. CKD (chronic kidney disease) stage 4, GFR 15-29 ml/min ([facility])  Stable  No uremic symptoms  Renal dose of medication  Avoid nephrotoxins  Continue same medication regimen  Patient was advised to call us if symptoms worsen      2. Controlled type 2 diabetes mellitus with stage 4 chronic kidney disease, without long-term current use of insulin ([facility])  Optimize diabetes control    3. Nephrolithiasis  Low-sodium diet  Increase water intake

## 2024-07-17 ENCOUNTER — HOSPITAL ENCOUNTER (OUTPATIENT)
Dept: LAB | Facility: MEDICAL CENTER | Age: 79
End: 2024-07-17
Attending: FAMILY MEDICINE
Payer: MEDICARE

## 2024-07-17 DIAGNOSIS — E11.22 CONTROLLED TYPE 2 DIABETES MELLITUS WITH CHRONIC KIDNEY DISEASE, WITHOUT LONG-TERM CURRENT USE OF INSULIN, UNSPECIFIED CKD STAGE (HCC): ICD-10-CM

## 2024-07-17 LAB
ALBUMIN SERPL BCP-MCNC: 4.2 G/DL (ref 3.2–4.9)
ALBUMIN/GLOB SERPL: 1.5 G/DL
ALP SERPL-CCNC: 66 U/L (ref 30–99)
ALT SERPL-CCNC: 15 U/L (ref 2–50)
ANION GAP SERPL CALC-SCNC: 15 MMOL/L (ref 7–16)
AST SERPL-CCNC: 19 U/L (ref 12–45)
BILIRUB SERPL-MCNC: 0.4 MG/DL (ref 0.1–1.5)
BUN SERPL-MCNC: 29 MG/DL (ref 8–22)
CALCIUM ALBUM COR SERPL-MCNC: 8.9 MG/DL (ref 8.5–10.5)
CALCIUM SERPL-MCNC: 9.1 MG/DL (ref 8.5–10.5)
CHLORIDE SERPL-SCNC: 110 MMOL/L (ref 96–112)
CO2 SERPL-SCNC: 18 MMOL/L (ref 20–33)
CREAT SERPL-MCNC: 3.22 MG/DL (ref 0.5–1.4)
EST. AVERAGE GLUCOSE BLD GHB EST-MCNC: 105 MG/DL
GFR SERPLBLD CREATININE-BSD FMLA CKD-EPI: 19 ML/MIN/1.73 M 2
GLOBULIN SER CALC-MCNC: 2.8 G/DL (ref 1.9–3.5)
GLUCOSE SERPL-MCNC: 72 MG/DL (ref 65–99)
HBA1C MFR BLD: 5.3 % (ref 4–5.6)
POTASSIUM SERPL-SCNC: 4.5 MMOL/L (ref 3.6–5.5)
PROT SERPL-MCNC: 7 G/DL (ref 6–8.2)
SODIUM SERPL-SCNC: 143 MMOL/L (ref 135–145)

## 2024-07-17 PROCEDURE — 80053 COMPREHEN METABOLIC PANEL: CPT

## 2024-07-17 PROCEDURE — 36415 COLL VENOUS BLD VENIPUNCTURE: CPT

## 2024-07-17 PROCEDURE — 83036 HEMOGLOBIN GLYCOSYLATED A1C: CPT | Mod: GA

## 2024-07-19 ENCOUNTER — TELEPHONE (OUTPATIENT)
Dept: NEPHROLOGY | Facility: MEDICAL CENTER | Age: 79
End: 2024-07-19

## 2024-07-19 ENCOUNTER — OFFICE VISIT (OUTPATIENT)
Dept: INTERNAL MEDICINE | Facility: IMAGING CENTER | Age: 79
End: 2024-07-19
Payer: MEDICARE

## 2024-07-19 VITALS
WEIGHT: 165 LBS | RESPIRATION RATE: 14 BRPM | DIASTOLIC BLOOD PRESSURE: 66 MMHG | HEART RATE: 72 BPM | OXYGEN SATURATION: 95 % | TEMPERATURE: 98 F | BODY MASS INDEX: 23.62 KG/M2 | HEIGHT: 70 IN | SYSTOLIC BLOOD PRESSURE: 130 MMHG

## 2024-07-19 DIAGNOSIS — E78.2 MIXED HYPERLIPIDEMIA: ICD-10-CM

## 2024-07-19 DIAGNOSIS — N18.4 CKD (CHRONIC KIDNEY DISEASE) STAGE 4, GFR 15-29 ML/MIN (HCC): ICD-10-CM

## 2024-07-19 DIAGNOSIS — N18.4 CONTROLLED TYPE 2 DIABETES MELLITUS WITH STAGE 4 CHRONIC KIDNEY DISEASE, WITHOUT LONG-TERM CURRENT USE OF INSULIN (HCC): ICD-10-CM

## 2024-07-19 DIAGNOSIS — E11.22 CONTROLLED TYPE 2 DIABETES MELLITUS WITH STAGE 4 CHRONIC KIDNEY DISEASE, WITHOUT LONG-TERM CURRENT USE OF INSULIN (HCC): ICD-10-CM

## 2024-07-19 PROCEDURE — 3075F SYST BP GE 130 - 139MM HG: CPT | Performed by: FAMILY MEDICINE

## 2024-07-19 PROCEDURE — 3078F DIAST BP <80 MM HG: CPT | Performed by: FAMILY MEDICINE

## 2024-07-19 PROCEDURE — 99214 OFFICE O/P EST MOD 30 MIN: CPT | Performed by: FAMILY MEDICINE

## 2024-07-19 RX ORDER — FENOFIBRATE 48 MG/1
48 TABLET, COATED ORAL
Qty: 100 TABLET | Refills: 3 | Status: SHIPPED | OUTPATIENT
Start: 2024-07-19

## 2024-07-19 ASSESSMENT — FIBROSIS 4 INDEX: FIB4 SCORE: 2.39

## 2024-08-02 RX ORDER — LEVOTHYROXINE SODIUM 50 UG/1
50 TABLET ORAL
Qty: 100 TABLET | Refills: 3 | Status: SHIPPED | OUTPATIENT
Start: 2024-08-02

## 2024-09-30 RX ORDER — SODIUM BICARBONATE 650 MG/1
650 TABLET ORAL 2 TIMES DAILY
Qty: 180 TABLET | Refills: 3 | Status: SHIPPED | OUTPATIENT
Start: 2024-09-30

## 2024-10-18 ENCOUNTER — OFFICE VISIT (OUTPATIENT)
Dept: INTERNAL MEDICINE | Facility: IMAGING CENTER | Age: 79
End: 2024-10-18
Payer: MEDICARE

## 2024-10-18 ENCOUNTER — HOSPITAL ENCOUNTER (OUTPATIENT)
Facility: MEDICAL CENTER | Age: 79
End: 2024-10-18
Attending: FAMILY MEDICINE
Payer: MEDICARE

## 2024-10-18 ENCOUNTER — HOSPITAL ENCOUNTER (OUTPATIENT)
Facility: MEDICAL CENTER | Age: 79
End: 2024-10-18
Attending: INTERNAL MEDICINE
Payer: MEDICARE

## 2024-10-18 VITALS
HEIGHT: 70 IN | HEART RATE: 61 BPM | BODY MASS INDEX: 23.62 KG/M2 | SYSTOLIC BLOOD PRESSURE: 138 MMHG | DIASTOLIC BLOOD PRESSURE: 80 MMHG | RESPIRATION RATE: 14 BRPM | WEIGHT: 165 LBS | OXYGEN SATURATION: 99 % | TEMPERATURE: 98.1 F

## 2024-10-18 DIAGNOSIS — E11.22 CONTROLLED TYPE 2 DIABETES MELLITUS WITH STAGE 4 CHRONIC KIDNEY DISEASE, WITHOUT LONG-TERM CURRENT USE OF INSULIN (HCC): ICD-10-CM

## 2024-10-18 DIAGNOSIS — N18.4 CONTROLLED TYPE 2 DIABETES MELLITUS WITH STAGE 4 CHRONIC KIDNEY DISEASE, WITHOUT LONG-TERM CURRENT USE OF INSULIN (HCC): ICD-10-CM

## 2024-10-18 DIAGNOSIS — E78.2 MIXED HYPERLIPIDEMIA: ICD-10-CM

## 2024-10-18 DIAGNOSIS — N18.4 CKD (CHRONIC KIDNEY DISEASE) STAGE 4, GFR 15-29 ML/MIN (HCC): ICD-10-CM

## 2024-10-18 DIAGNOSIS — D63.8 ANEMIA OF CHRONIC DISEASE: ICD-10-CM

## 2024-10-18 DIAGNOSIS — E11.22 CONTROLLED TYPE 2 DIABETES MELLITUS WITH CHRONIC KIDNEY DISEASE, WITHOUT LONG-TERM CURRENT USE OF INSULIN, UNSPECIFIED CKD STAGE (HCC): ICD-10-CM

## 2024-10-18 LAB
ALBUMIN SERPL BCP-MCNC: 4.6 G/DL (ref 3.2–4.9)
ALBUMIN/GLOB SERPL: 1.7 G/DL
ALP SERPL-CCNC: 84 U/L (ref 30–99)
ALT SERPL-CCNC: 14 U/L (ref 2–50)
ANION GAP SERPL CALC-SCNC: 11 MMOL/L (ref 7–16)
AST SERPL-CCNC: 18 U/L (ref 12–45)
BILIRUB SERPL-MCNC: 0.5 MG/DL (ref 0.1–1.5)
BUN SERPL-MCNC: 30 MG/DL (ref 8–22)
CALCIUM ALBUM COR SERPL-MCNC: 9.4 MG/DL (ref 8.5–10.5)
CALCIUM SERPL-MCNC: 9.9 MG/DL (ref 8.5–10.5)
CHLORIDE SERPL-SCNC: 111 MMOL/L (ref 96–112)
CHOLEST SERPL-MCNC: 116 MG/DL (ref 100–199)
CO2 SERPL-SCNC: 18 MMOL/L (ref 20–33)
CREAT SERPL-MCNC: 2.9 MG/DL (ref 0.5–1.4)
CREAT UR-MCNC: 71.2 MG/DL
ERYTHROCYTE [DISTWIDTH] IN BLOOD BY AUTOMATED COUNT: 47.1 FL (ref 35.9–50)
GFR SERPLBLD CREATININE-BSD FMLA CKD-EPI: 21 ML/MIN/1.73 M 2
GLOBULIN SER CALC-MCNC: 2.7 G/DL (ref 1.9–3.5)
GLUCOSE SERPL-MCNC: 117 MG/DL (ref 65–99)
HBA1C MFR BLD: 6.1 % (ref ?–5.8)
HCT VFR BLD AUTO: 37.7 % (ref 42–52)
HDLC SERPL-MCNC: 37 MG/DL
HGB BLD-MCNC: 12.8 G/DL (ref 14–18)
LDLC SERPL CALC-MCNC: 59 MG/DL
MCH RBC QN AUTO: 31.9 PG (ref 27–33)
MCHC RBC AUTO-ENTMCNC: 34 G/DL (ref 32.3–36.5)
MCV RBC AUTO: 94 FL (ref 81.4–97.8)
MICROALBUMIN UR-MCNC: 3.8 MG/DL
MICROALBUMIN/CREAT UR: 53 MG/G (ref 0–30)
PLATELET # BLD AUTO: 184 K/UL (ref 164–446)
PMV BLD AUTO: 10.9 FL (ref 9–12.9)
POCT INT CON NEG: NEGATIVE
POCT INT CON POS: POSITIVE
POTASSIUM SERPL-SCNC: 4.6 MMOL/L (ref 3.6–5.5)
PROT SERPL-MCNC: 7.3 G/DL (ref 6–8.2)
RBC # BLD AUTO: 4.01 M/UL (ref 4.7–6.1)
SODIUM SERPL-SCNC: 140 MMOL/L (ref 135–145)
TRIGL SERPL-MCNC: 101 MG/DL (ref 0–149)
WBC # BLD AUTO: 7.7 K/UL (ref 4.8–10.8)

## 2024-10-18 PROCEDURE — 80053 COMPREHEN METABOLIC PANEL: CPT

## 2024-10-18 PROCEDURE — 83036 HEMOGLOBIN GLYCOSYLATED A1C: CPT | Performed by: FAMILY MEDICINE

## 2024-10-18 PROCEDURE — 82043 UR ALBUMIN QUANTITATIVE: CPT

## 2024-10-18 PROCEDURE — G2211 COMPLEX E/M VISIT ADD ON: HCPCS | Performed by: FAMILY MEDICINE

## 2024-10-18 PROCEDURE — 82570 ASSAY OF URINE CREATININE: CPT

## 2024-10-18 PROCEDURE — 80061 LIPID PANEL: CPT

## 2024-10-18 PROCEDURE — 99214 OFFICE O/P EST MOD 30 MIN: CPT | Performed by: FAMILY MEDICINE

## 2024-10-18 PROCEDURE — 85027 COMPLETE CBC AUTOMATED: CPT

## 2024-10-18 ASSESSMENT — FIBROSIS 4 INDEX: FIB4 SCORE: 2.39

## 2024-10-29 RX ORDER — TAMSULOSIN HYDROCHLORIDE 0.4 MG/1
0.4 CAPSULE ORAL DAILY
Qty: 90 CAPSULE | Refills: 3 | Status: SHIPPED | OUTPATIENT
Start: 2024-10-29

## 2024-10-29 RX ORDER — TAMSULOSIN HYDROCHLORIDE 0.4 MG/1
0.4 CAPSULE ORAL
Qty: 90 CAPSULE | Refills: 3 | Status: SHIPPED | OUTPATIENT
Start: 2024-10-29

## 2024-11-26 ENCOUNTER — OFFICE VISIT (OUTPATIENT)
Dept: NEPHROLOGY | Facility: MEDICAL CENTER | Age: 79
End: 2024-11-26
Payer: MEDICARE

## 2024-11-26 VITALS
OXYGEN SATURATION: 98 % | BODY MASS INDEX: 24.48 KG/M2 | DIASTOLIC BLOOD PRESSURE: 80 MMHG | SYSTOLIC BLOOD PRESSURE: 140 MMHG | HEART RATE: 79 BPM | TEMPERATURE: 98.2 F | WEIGHT: 171 LBS | HEIGHT: 70 IN

## 2024-11-26 DIAGNOSIS — N20.0 NEPHROLITHIASIS: ICD-10-CM

## 2024-11-26 DIAGNOSIS — N18.4 CKD (CHRONIC KIDNEY DISEASE) STAGE 4, GFR 15-29 ML/MIN (HCC): ICD-10-CM

## 2024-11-26 DIAGNOSIS — I10 PRIMARY HYPERTENSION: ICD-10-CM

## 2024-11-26 ASSESSMENT — ENCOUNTER SYMPTOMS
HYPERTENSION: 1
VOMITING: 0
FEVER: 0
COUGH: 0
SHORTNESS OF BREATH: 0
NAUSEA: 0
CHILLS: 0

## 2024-11-26 ASSESSMENT — FIBROSIS 4 INDEX: FIB4 SCORE: 2.12

## 2024-11-26 NOTE — PROGRESS NOTES
"Subjective     Raikarey Leija Jr. is a 79 y.o. male who presents with Chronic Kidney Disease            Chronic Kidney Disease  This is a chronic problem. The current episode started more than 1 year ago. The problem occurs constantly. The problem has been unchanged. Pertinent negatives include no chest pain, chills, coughing, fever, nausea, urinary symptoms or vomiting.   Hypertension  This is a new problem. The current episode started today. The problem has been waxing and waning since onset. The problem is controlled. Pertinent negatives include no chest pain, malaise/fatigue, peripheral edema or shortness of breath. Risk factors for coronary artery disease include male gender. Past treatments include nothing. Compliance problems include diet.        Review of Systems   Constitutional:  Negative for chills, fever and malaise/fatigue.   Respiratory:  Negative for cough and shortness of breath.    Cardiovascular:  Negative for chest pain and leg swelling.   Gastrointestinal:  Negative for nausea and vomiting.   Genitourinary:  Negative for dysuria, frequency and urgency.              Objective     BP (!) 140/80   Pulse 79   Temp 36.8 °C (98.2 °F) (Temporal)   Ht 1.778 m (5' 10\")   Wt 77.6 kg (171 lb)   SpO2 98%   BMI 24.54 kg/m²      Physical Exam  Vitals and nursing note reviewed.   Constitutional:       General: He is not in acute distress.     Appearance: He is not ill-appearing.   HENT:      Head: Normocephalic and atraumatic.      Right Ear: External ear normal.      Left Ear: External ear normal.      Nose: Nose normal.   Eyes:      General:         Right eye: No discharge.         Left eye: No discharge.      Conjunctiva/sclera: Conjunctivae normal.   Cardiovascular:      Rate and Rhythm: Normal rate and regular rhythm.      Heart sounds: No murmur heard.  Pulmonary:      Effort: Pulmonary effort is normal. No respiratory distress.      Breath sounds: Normal breath sounds. No wheezing. "   Musculoskeletal:         General: No tenderness or deformity.      Right lower leg: No edema.      Left lower leg: No edema.   Skin:     General: Skin is warm.   Neurological:      General: No focal deficit present.      Mental Status: He is alert and oriented to person, place, and time.   Psychiatric:         Mood and Affect: Mood normal.         Behavior: Behavior normal.       Past Medical History:   Diagnosis Date    Anemia of chronic disease 2024    Bronchitis 2023    CKD (chronic kidney disease) stage 4, GFR 15-29 ml/min ([facility]) 10/06/2016    Diabetes mellitus type II     oral medications    Elevated BP 2010    Functional diarrhea 2022    Hearing decreased     seeing ENT    High cholesterol     Hyperlipidemia 2010    Hypertriglyceridemia 2010    Mixed hyperlipidemia 2010    Renal stone     sees Urology    Transaminitis 2011     Social History     Socioeconomic History    Marital status:      Spouse name: Not on file    Number of children: Not on file    Years of education: Not on file    Highest education level: Not on file   Occupational History    Occupation: Retired     Employer: OTHER   Tobacco Use    Smoking status: Former     Current packs/day: 0.00     Average packs/day: 1 pack/day for 20.0 years (20.0 ttl pk-yrs)     Types: Cigarettes     Start date: 10/26/1966     Quit date: 10/26/1986     Years since quittin.1    Smokeless tobacco: Never   Vaping Use    Vaping status: Never Used   Substance and Sexual Activity    Alcohol use: Not Currently     Alcohol/week: 0.0 oz     Comment: a few a year    Drug use: No    Sexual activity: Not Currently     Partners: Female     Comment: ,    Other Topics Concern     Service No    Blood Transfusions No    Caffeine Concern No    Occupational Exposure No    Hobby Hazards No    Sleep Concern No    Stress Concern No    Weight Concern No    Special Diet No    Back Care No    Exercise Yes     Bike Helmet No    Seat Belt Yes    Self-Exams No   Social History Narrative    - no children     Social Drivers of Health     Financial Resource Strain: Not on file   Food Insecurity: Not on file   Transportation Needs: Not on file   Physical Activity: Not on file   Stress: Not on file   Social Connections: Not on file   Intimate Partner Violence: Not on file   Housing Stability: Not on file     Family History   Problem Relation Age of Onset    No Known Problems Mother     GI Disease Father         polyps    Heart Disease Brother     Cancer Brother 76        Leukemia     Recent Labs     04/03/24  0843 05/02/24  0838 07/17/24  0923 10/18/24  1430   ALBUMIN 4.4  --  4.2 4.6   HDL 42  --   --  37*   TRIGLYCERIDE 63  --   --  101   SODIUM 145 139 143 140   POTASSIUM 4.5 4.9 4.5 4.6   CHLORIDE 113* 109 110 111   CO2 19* 17* 18* 18*   BUN 23* 33* 29* 30*   CREATININE 2.20* 2.65* 3.22* 2.90*                             Assessment & Plan        Assessment & Plan  CKD (chronic kidney disease) stage 4, GFR 15-29 ml/min ([facility])  Stable  No uremic symptoms  Renal dose of medication  Avoid nephrotoxins  Continue same medication regimen  Patient was advised to call us if symptoms worsen      Primary hypertension  Blood pressure was elevated at the beginning of the visit today, repeat showed systolic blood pressure 140 and diastolic 80  Patient did mention that he had few occasion where his systolic blood pressure was in the mid 140s  Patient has no chest pain or shortness of breath  I advised the patient to be on low-sodium diet, check blood pressure at home regularly and keep a log book to see if we need to start antihypertension medication  Also patient was advised to follow-up with Dr. Ko  Nephrolithiasis  Patient was advised to be on low-sodium diet  Increase water intake

## 2024-11-26 NOTE — ASSESSMENT & PLAN NOTE
Stable  No uremic symptoms  Renal dose of medication  Avoid nephrotoxins  Continue same medication regimen  Patient was advised to call us if symptoms worsen

## 2025-02-07 DIAGNOSIS — E11.22 CONTROLLED TYPE 2 DIABETES MELLITUS WITH STAGE 4 CHRONIC KIDNEY DISEASE, WITHOUT LONG-TERM CURRENT USE OF INSULIN (HCC): ICD-10-CM

## 2025-02-07 DIAGNOSIS — E03.8 OTHER SPECIFIED HYPOTHYROIDISM: ICD-10-CM

## 2025-02-07 DIAGNOSIS — N18.4 CONTROLLED TYPE 2 DIABETES MELLITUS WITH STAGE 4 CHRONIC KIDNEY DISEASE, WITHOUT LONG-TERM CURRENT USE OF INSULIN (HCC): ICD-10-CM

## 2025-02-07 DIAGNOSIS — E78.2 MIXED HYPERLIPIDEMIA: ICD-10-CM

## 2025-05-05 DIAGNOSIS — E03.9 ACQUIRED HYPOTHYROIDISM: ICD-10-CM

## 2025-05-05 RX ORDER — GLIMEPIRIDE 1 MG/1
1 TABLET ORAL EVERY MORNING
Qty: 90 TABLET | Refills: 3 | Status: SHIPPED | OUTPATIENT
Start: 2025-05-05

## 2025-05-05 NOTE — ASSESSMENT & PLAN NOTE
A1c improved,   Follows with optometry yearly  Has no trouble with his feet: monofilament exam done today  PNA immunizations complete  Continues on januvia and glimepiride. Has no symptoms of hypoglycemia.   He does some sweet drinks: OJ, cranberry juice, hot chocolate  Advised to moderate intake of sweetened beverages and junk foods.   Urine microalbumin normal  Lipids improving on simvastatin and fenofibrate.      no

## 2025-05-08 ENCOUNTER — HOSPITAL ENCOUNTER (OUTPATIENT)
Dept: LAB | Facility: MEDICAL CENTER | Age: 80
End: 2025-05-08
Attending: FAMILY MEDICINE
Payer: MEDICARE

## 2025-05-08 DIAGNOSIS — E03.8 OTHER SPECIFIED HYPOTHYROIDISM: ICD-10-CM

## 2025-05-08 DIAGNOSIS — E11.22 CONTROLLED TYPE 2 DIABETES MELLITUS WITH STAGE 4 CHRONIC KIDNEY DISEASE, WITHOUT LONG-TERM CURRENT USE OF INSULIN (HCC): ICD-10-CM

## 2025-05-08 DIAGNOSIS — E03.9 ACQUIRED HYPOTHYROIDISM: ICD-10-CM

## 2025-05-08 DIAGNOSIS — N18.4 CONTROLLED TYPE 2 DIABETES MELLITUS WITH STAGE 4 CHRONIC KIDNEY DISEASE, WITHOUT LONG-TERM CURRENT USE OF INSULIN (HCC): ICD-10-CM

## 2025-05-08 DIAGNOSIS — E78.2 MIXED HYPERLIPIDEMIA: ICD-10-CM

## 2025-05-08 LAB
ERYTHROCYTE [DISTWIDTH] IN BLOOD BY AUTOMATED COUNT: 47.3 FL (ref 35.9–50)
EST. AVERAGE GLUCOSE BLD GHB EST-MCNC: 131 MG/DL
HBA1C MFR BLD: 6.2 % (ref 4–5.6)
HCT VFR BLD AUTO: 38.2 % (ref 42–52)
HGB BLD-MCNC: 12.4 G/DL (ref 14–18)
MCH RBC QN AUTO: 30.6 PG (ref 27–33)
MCHC RBC AUTO-ENTMCNC: 32.5 G/DL (ref 32.3–36.5)
MCV RBC AUTO: 94.3 FL (ref 81.4–97.8)
PLATELET # BLD AUTO: 200 K/UL (ref 164–446)
PMV BLD AUTO: 10.5 FL (ref 9–12.9)
RBC # BLD AUTO: 4.05 M/UL (ref 4.7–6.1)
WBC # BLD AUTO: 8.4 K/UL (ref 4.8–10.8)

## 2025-05-08 PROCEDURE — 80053 COMPREHEN METABOLIC PANEL: CPT

## 2025-05-08 PROCEDURE — 83036 HEMOGLOBIN GLYCOSYLATED A1C: CPT | Mod: GA

## 2025-05-08 PROCEDURE — 85027 COMPLETE CBC AUTOMATED: CPT

## 2025-05-08 PROCEDURE — 84443 ASSAY THYROID STIM HORMONE: CPT

## 2025-05-08 PROCEDURE — 80061 LIPID PANEL: CPT

## 2025-05-08 PROCEDURE — 36415 COLL VENOUS BLD VENIPUNCTURE: CPT

## 2025-05-08 PROCEDURE — 84439 ASSAY OF FREE THYROXINE: CPT

## 2025-05-09 ENCOUNTER — OFFICE VISIT (OUTPATIENT)
Dept: INTERNAL MEDICINE | Facility: IMAGING CENTER | Age: 80
End: 2025-05-09
Payer: MEDICARE

## 2025-05-09 VITALS
TEMPERATURE: 98 F | OXYGEN SATURATION: 98 % | SYSTOLIC BLOOD PRESSURE: 148 MMHG | HEART RATE: 65 BPM | HEIGHT: 70 IN | RESPIRATION RATE: 14 BRPM | BODY MASS INDEX: 23.91 KG/M2 | WEIGHT: 167 LBS | DIASTOLIC BLOOD PRESSURE: 74 MMHG

## 2025-05-09 DIAGNOSIS — I10 PRIMARY HYPERTENSION: ICD-10-CM

## 2025-05-09 DIAGNOSIS — E78.2 MIXED HYPERLIPIDEMIA: ICD-10-CM

## 2025-05-09 DIAGNOSIS — N18.4 CKD (CHRONIC KIDNEY DISEASE) STAGE 4, GFR 15-29 ML/MIN (HCC): ICD-10-CM

## 2025-05-09 DIAGNOSIS — N18.4 CONTROLLED TYPE 2 DIABETES MELLITUS WITH STAGE 4 CHRONIC KIDNEY DISEASE, WITHOUT LONG-TERM CURRENT USE OF INSULIN (HCC): ICD-10-CM

## 2025-05-09 DIAGNOSIS — E03.9 ACQUIRED HYPOTHYROIDISM: ICD-10-CM

## 2025-05-09 DIAGNOSIS — E11.22 CONTROLLED TYPE 2 DIABETES MELLITUS WITH STAGE 4 CHRONIC KIDNEY DISEASE, WITHOUT LONG-TERM CURRENT USE OF INSULIN (HCC): ICD-10-CM

## 2025-05-09 LAB
ALBUMIN SERPL BCP-MCNC: 4.2 G/DL (ref 3.2–4.9)
ALBUMIN/GLOB SERPL: 1.4 G/DL
ALP SERPL-CCNC: 68 U/L (ref 30–99)
ALT SERPL-CCNC: 17 U/L (ref 2–50)
ANION GAP SERPL CALC-SCNC: 11 MMOL/L (ref 7–16)
AST SERPL-CCNC: 23 U/L (ref 12–45)
BILIRUB SERPL-MCNC: 0.4 MG/DL (ref 0.1–1.5)
BUN SERPL-MCNC: 41 MG/DL (ref 8–22)
CALCIUM ALBUM COR SERPL-MCNC: 8.9 MG/DL (ref 8.5–10.5)
CALCIUM SERPL-MCNC: 9.1 MG/DL (ref 8.5–10.5)
CHLORIDE SERPL-SCNC: 116 MMOL/L (ref 96–112)
CHOLEST SERPL-MCNC: 98 MG/DL (ref 100–199)
CO2 SERPL-SCNC: 13 MMOL/L (ref 20–33)
CREAT SERPL-MCNC: 2.91 MG/DL (ref 0.5–1.4)
FASTING STATUS PATIENT QL REPORTED: NORMAL
GFR SERPLBLD CREATININE-BSD FMLA CKD-EPI: 21 ML/MIN/1.73 M 2
GLOBULIN SER CALC-MCNC: 3.1 G/DL (ref 1.9–3.5)
GLUCOSE SERPL-MCNC: 92 MG/DL (ref 65–99)
HDLC SERPL-MCNC: 33 MG/DL
LDLC SERPL CALC-MCNC: 45 MG/DL
POTASSIUM SERPL-SCNC: 4.1 MMOL/L (ref 3.6–5.5)
PROT SERPL-MCNC: 7.3 G/DL (ref 6–8.2)
SODIUM SERPL-SCNC: 140 MMOL/L (ref 135–145)
T4 FREE SERPL-MCNC: 1.05 NG/DL (ref 0.93–1.7)
TRIGL SERPL-MCNC: 102 MG/DL (ref 0–149)
TSH SERPL-ACNC: 3.53 UIU/ML (ref 0.38–5.33)

## 2025-05-09 PROCEDURE — 99214 OFFICE O/P EST MOD 30 MIN: CPT | Performed by: FAMILY MEDICINE

## 2025-05-09 PROCEDURE — G2211 COMPLEX E/M VISIT ADD ON: HCPCS | Performed by: FAMILY MEDICINE

## 2025-05-09 PROCEDURE — 3077F SYST BP >= 140 MM HG: CPT | Performed by: FAMILY MEDICINE

## 2025-05-09 PROCEDURE — 3078F DIAST BP <80 MM HG: CPT | Performed by: FAMILY MEDICINE

## 2025-05-09 RX ORDER — LOSARTAN POTASSIUM 100 MG/1
100 TABLET ORAL DAILY
Qty: 90 TABLET | Refills: 3 | Status: SHIPPED | OUTPATIENT
Start: 2025-05-09 | End: 2026-06-13

## 2025-05-09 ASSESSMENT — FIBROSIS 4 INDEX: FIB4 SCORE: 2.29

## 2025-05-09 ASSESSMENT — PATIENT HEALTH QUESTIONNAIRE - PHQ9: CLINICAL INTERPRETATION OF PHQ2 SCORE: 0

## 2025-05-09 NOTE — PROGRESS NOTES
Verbal consent was acquired by the patient to use [employer] ambient listening note generation during this visit yes    Assessment & Plan:  Assessment & Plan  Type 2 diabetes.  His current regimen of glimepiride 1 mg, administered every morning for 4 days a week, has been effective in managing his condition.  Diagnostic plan: Recent lab results indicate a blood sugar level of 92 and an A1c of 6.2.  Treatment plan: The current glimepiride regimen will be maintained, taking it only 4 days a week.    Acquired hypothyroidism.  He is currently on a daily dose of levothyroxine 50 mcg.  Diagnostic plan: Recent lab results show a normal TSH level of 3.530 and a normal free T4 level of 1.05.  Treatment plan: The current levothyroxine regimen will be continued.    Chronic kidney disease, stage IV.  He is under the care of Dr. Sexton for this condition, with a scheduled appointment on 05/27/2025.  Diagnostic plan: Recent GFR results indicate stability, with a GFR of 21 in 10/2024 and the same reading currently.  Treatment plan: This suggests no significant fluctuations, which is a positive sign. However, his kidneys are aging at a faster rate than the rest of his body, underscoring the importance of managing his blood pressure effectively.    Mixed hyperlipidemia.  He is currently on a daily dose of simvastatin 40 mg, which appears to be effective.  Diagnostic plan: Recent lab results show a total cholesterol level of 98, triglycerides of 102, HDL of 33, and LDL of 45.  Treatment plan: This suggests that his risk of developing heart disease is being well-managed.    Hypertension.  His blood pressure has been gradually increasing, with a reading of 140/80 in 11/2024 and 148/74 today.  Diagnostic plan: Given his history of diabetes and kidney disease, it is crucial to manage his blood pressure effectively. The target blood pressure is <130/90.  Treatment plan: He will be prescribed losartan 100 mg daily, which he will take for  approximately 2 weeks before his appointment with Dr. Sexton. At that time, the dosage can be adjusted if necessary.  Clinical decision making: Although he is hesitant to start a new medication, it is essential to lower his blood pressure.    Follow-up: Scheduled appointment with Dr. Sexton on 05/27/2025.  See me on 8/22/25 for AWV.  Billing : secondary to the complexity of this patient's illnesses and their interactions.  All problems listed were discussed during the office visit, medications were evaluated and complexities were discussed as well as plan for the future.         Subjective:     HPI:   History of Present Illness  The patient presents for evaluation of chronic health problems, which include type 2 diabetes without insulin, acquired hypothyroidism, stage IV chronic kidney disease, mixed hyperlipidemia, and hypertension.    Type 2 Diabetes  He is currently on a regimen of glimepiride 1 mg, administered every morning for 4 days a week. His recent lab results show a blood sugar level of 92 and an A1c of 6.2.  - Alleviating/Aggravating Factors: Glimepiride 1 mg, taken every morning for 4 days a week.  - Timing: Recent lab results show blood sugar level of 92 and A1c of 6.2.    Acquired Hypothyroidism  For hypothyroidism, he is on a daily dose of levothyroxine 50 mcg. His TSH is normal at 3.530, and his free T4 is normal at 1.05.  - Alleviating/Aggravating Factors: Levothyroxine 50 mcg daily.  - Timing: TSH and free T4 are currently normal.    Stage IV Chronic Kidney Disease  He is under the care of Dr. Sexton for chronic kidney disease, with a scheduled appointment on 05/27/2025. His GFR remains stable at 21, both in October 2024 and currently.  - Timing: GFR stable at 21 since October 2024.    Mixed Hyperlipidemia  For mixed hyperlipidemia, he is on a daily dose of simvastatin 40 mg. His recent lipid panel shows total cholesterol at 98, triglycerides at 102, HDL at 33, and LDL at 45.  -  "Alleviating/Aggravating Factors: Simvastatin 40 mg daily.  - Timing: Recent lipid panel shows total cholesterol at 98, triglycerides at 102, HDL at 33, and LDL at 45.    Hypertension  His blood pressure has been gradually increasing, with a reading of 140/80 in 11/2024 and 148/74 today. Given his history of diabetes and kidney disease, it is crucial to manage his blood pressure effectively. The target blood pressure is less than 130/90. Although he is hesitant to start a new medication, it is essential to lower his blood pressure. He will be prescribed losartan 100 mg daily, which he will take for approximately 2 weeks before his appointment with Dr. Sexton. At that time, the dosage can be adjusted if necessary.  - Onset: Gradual increase in blood pressure.  - Timing: Blood pressure readings of 140/80 in 11/2024 and 148/74 today.  - Alleviating/Aggravating Factors: Hesitant to start new medication; prescribed losartan 100 mg daily.  - Severity: Target blood pressure is less than 130/90.    Eye Issue  He reports experiencing difficulties with his left eye, which is not focusing as well as the right eye and exhibits constant watering. He has an upcoming appointment with Dr. Albrecht on 05/23/2025 to address this issue.  - Onset: Recent difficulties with left eye.  - Location: Left eye.  - Character: Not focusing as well as the right eye; constant watering.  - Timing: Upcoming appointment with Dr. Albrecht on 05/23/2025.    Health Maintenance: Completed    Objective:     Exam:     BP (!) 148/74 (BP Location: Left arm, Patient Position: Sitting, BP Cuff Size: Adult)   Pulse 65   Temp 36.7 °C (98 °F) (Temporal)   Resp 14   Ht 1.778 m (5' 10\")   Wt 75.8 kg (167 lb)   SpO2 98%   BMI 23.96 kg/m²  Body mass index is 23.96 kg/m².  Physical Exam  Constitutional:       General: he is not in acute distress.     Appearance: Normal appearance. he is normal weight. he is not ill-appearing.       Results  Labs   - Blood sugar: 92 " mg/dL   - A1c: 6.2%   - TSH: 3.530 uIU/mL   - Free T4: 1.05 ng/dL   - GFR: 21 mL/min/1.73m²   - Total cholesterol: 98 mg/dL   - Triglycerides: 102 mg/dL   - HDL: 33 mg/dL   - LDL: 45 mg/dL                ORDERS     1. Controlled type 2 diabetes mellitus with stage 4 chronic kidney disease, without long-term current use of insulin ([facility])  - Diabetic Monofilament Lower Extremity Exam    2. Acquired hypothyroidism      3. CKD (chronic kidney disease) stage 4, GFR 15-29 ml/min ([facility])      4. Mixed hyperlipidemia      5. Primary hypertension                  Please note that this dictation was created using voice recognition software. I have made every reasonable attempt to correct obvious errors, but I expect that there are errors of grammar and possibly content that I did not discover before finalizing the note.

## 2025-05-27 ENCOUNTER — OFFICE VISIT (OUTPATIENT)
Dept: NEPHROLOGY | Facility: MEDICAL CENTER | Age: 80
End: 2025-05-27
Payer: MEDICARE

## 2025-05-27 VITALS
SYSTOLIC BLOOD PRESSURE: 156 MMHG | WEIGHT: 167.9 LBS | HEIGHT: 71 IN | OXYGEN SATURATION: 98 % | HEART RATE: 67 BPM | RESPIRATION RATE: 12 BRPM | DIASTOLIC BLOOD PRESSURE: 62 MMHG | TEMPERATURE: 98 F | BODY MASS INDEX: 23.51 KG/M2

## 2025-05-27 DIAGNOSIS — N18.4 CKD (CHRONIC KIDNEY DISEASE) STAGE 4, GFR 15-29 ML/MIN (HCC): Primary | ICD-10-CM

## 2025-05-27 DIAGNOSIS — E11.22 CONTROLLED TYPE 2 DIABETES MELLITUS WITH STAGE 4 CHRONIC KIDNEY DISEASE, WITHOUT LONG-TERM CURRENT USE OF INSULIN (HCC): ICD-10-CM

## 2025-05-27 DIAGNOSIS — N18.4 CONTROLLED TYPE 2 DIABETES MELLITUS WITH STAGE 4 CHRONIC KIDNEY DISEASE, WITHOUT LONG-TERM CURRENT USE OF INSULIN (HCC): ICD-10-CM

## 2025-05-27 DIAGNOSIS — I10 PRIMARY HYPERTENSION: ICD-10-CM

## 2025-05-27 PROCEDURE — G2211 COMPLEX E/M VISIT ADD ON: HCPCS | Performed by: INTERNAL MEDICINE

## 2025-05-27 PROCEDURE — 3077F SYST BP >= 140 MM HG: CPT | Performed by: INTERNAL MEDICINE

## 2025-05-27 PROCEDURE — 3078F DIAST BP <80 MM HG: CPT | Performed by: INTERNAL MEDICINE

## 2025-05-27 PROCEDURE — 99214 OFFICE O/P EST MOD 30 MIN: CPT | Performed by: INTERNAL MEDICINE

## 2025-05-27 ASSESSMENT — ENCOUNTER SYMPTOMS
SHORTNESS OF BREATH: 0
FEVER: 0
NAUSEA: 0
COUGH: 0
VOMITING: 0
HYPERTENSION: 1
CHILLS: 0

## 2025-05-27 ASSESSMENT — FIBROSIS 4 INDEX: FIB4 SCORE: 2.29

## 2025-05-27 NOTE — PROGRESS NOTES
"Subjective     Raikarey Leija Jr. is a 80 y.o. male who presents with Chronic Kidney Disease and Hypertension            Chronic Kidney Disease  This is a chronic problem. The current episode started more than 1 year ago. The problem occurs constantly. The problem has been unchanged. Pertinent negatives include no chest pain, chills, coughing, fever, nausea, urinary symptoms or vomiting.   Hypertension  This is a chronic problem. The current episode started more than 1 year ago. The problem has been waxing and waning since onset. The problem is uncontrolled. Pertinent negatives include no chest pain, malaise/fatigue, peripheral edema or shortness of breath. Agents associated with hypertension include thyroid hormones. Risk factors for coronary artery disease include male gender and diabetes mellitus. Past treatments include nothing. The current treatment provides no improvement. There are no compliance problems.  Hypertensive end-organ damage includes kidney disease. Identifiable causes of hypertension include chronic renal disease.       Review of Systems   Constitutional:  Negative for chills, fever and malaise/fatigue.   Respiratory:  Negative for cough and shortness of breath.    Cardiovascular:  Negative for chest pain and leg swelling.   Gastrointestinal:  Negative for nausea and vomiting.   Genitourinary:  Negative for dysuria, frequency and urgency.              Objective     BP (!) 156/62 (BP Location: Right arm, Patient Position: Sitting, BP Cuff Size: Adult)   Pulse 67   Temp 36.7 °C (98 °F) (Temporal)   Resp 12   Ht 1.803 m (5' 11\")   Wt 76.2 kg (167 lb 14.4 oz)   SpO2 98%   BMI 23.42 kg/m²      Physical Exam  Vitals and nursing note reviewed.   Constitutional:       General: He is not in acute distress.     Appearance: He is not ill-appearing.   HENT:      Head: Normocephalic and atraumatic.      Right Ear: External ear normal.      Left Ear: External ear normal.      Nose: Nose normal. "   Eyes:      General:         Right eye: No discharge.         Left eye: No discharge.      Conjunctiva/sclera: Conjunctivae normal.   Cardiovascular:      Rate and Rhythm: Normal rate and regular rhythm.      Heart sounds: No murmur heard.  Pulmonary:      Effort: Pulmonary effort is normal. No respiratory distress.      Breath sounds: Normal breath sounds. No wheezing.   Musculoskeletal:         General: No tenderness or deformity.      Right lower leg: No edema.      Left lower leg: No edema.   Skin:     General: Skin is warm.   Neurological:      General: No focal deficit present.      Mental Status: He is alert and oriented to person, place, and time.   Psychiatric:         Mood and Affect: Mood normal.         Behavior: Behavior normal.     Past Medical History[1]  Social History     Socioeconomic History    Marital status:      Spouse name: Not on file    Number of children: Not on file    Years of education: Not on file    Highest education level: Not on file   Occupational History    Occupation: Retired     Employer: OTHER   Tobacco Use    Smoking status: Former     Current packs/day: 0.00     Average packs/day: 1 pack/day for 20.0 years (20.0 ttl pk-yrs)     Types: Cigarettes     Start date: 10/26/1966     Quit date: 10/26/1986     Years since quittin.6    Smokeless tobacco: Never   Vaping Use    Vaping status: Never Used   Substance and Sexual Activity    Alcohol use: Not Currently     Alcohol/week: 0.0 oz     Comment: a few a year    Drug use: No    Sexual activity: Not Currently     Partners: Female     Comment: ,    Other Topics Concern     Service No    Blood Transfusions No    Caffeine Concern No    Occupational Exposure No    Hobby Hazards No    Sleep Concern No    Stress Concern No    Weight Concern No    Special Diet No    Back Care No    Exercise Yes    Bike Helmet No    Seat Belt Yes    Self-Exams No   Social History Narrative    - no children      Social Drivers of Health     Financial Resource Strain: Not on file   Food Insecurity: Not on file   Transportation Needs: Not on file   Physical Activity: Not on file   Stress: Not on file   Social Connections: Not on file   Intimate Partner Violence: Not on file   Housing Stability: Not on file     Family History   Problem Relation Age of Onset    No Known Problems Mother     GI Disease Father         polyps    Heart Disease Brother     Cancer Brother 76        Leukemia     Recent Labs     07/17/24  0923 10/18/24  1430 05/08/25  0650   ALBUMIN 4.2 4.6 4.2   HDL  --  37* 33*   TRIGLYCERIDE  --  101 102   SODIUM 143 140 140   POTASSIUM 4.5 4.6 4.1   CHLORIDE 110 111 116*   CO2 18* 18* 13*   BUN 29* 30* 41*   CREATININE 3.22* 2.90* 2.91*                                  Assessment & Plan  CKD (chronic kidney disease) stage 4, GFR 15-29 ml/min ([facility])  Stable  No uremic symptoms  Renal dose of medication  Avoid nephrotoxins  Continue same medication regimen  Patient was advised to call us if symptoms worsen    Orders:    Basic Metabolic Panel; Future    CBC WITHOUT DIFFERENTIAL; Future    MICROALB/CREAT RATIO RAND. UR    Primary hypertension  Uncontrolled  Patient was started on losartan by his primary care provider however patient did not start the medication because of concern of side effects  I advised the patient to be on low-sodium diet  Start half a dose of losartan see how his side effect will affect him  Orders:    Basic Metabolic Panel; Future    CBC WITHOUT DIFFERENTIAL; Future    MICROALB/CREAT RATIO RAND. UR    Controlled type 2 diabetes mellitus with stage 4 chronic kidney disease, without long-term current use of insulin ([facility])    Orders:    Basic Metabolic Panel; Future    CBC WITHOUT DIFFERENTIAL; Future    MICROALB/CREAT RATIO RAND. UR                      [1]   Past Medical History:  Diagnosis Date    Anemia of chronic disease 04/05/2024    Bronchitis 06/23/2023    CKD (chronic kidney disease) stage  4, GFR 15-29 ml/min ([facility]) 10/06/2016    Diabetes mellitus type II     oral medications    Elevated BP 09/09/2010    Functional diarrhea 09/16/2022    Hearing decreased     seeing ENT    High cholesterol     Hyperlipidemia 12/01/2010    Hypertriglyceridemia 12/01/2010    Mixed hyperlipidemia 12/01/2010    Renal stone     sees Urology    Transaminitis 04/12/2011

## 2025-05-27 NOTE — ASSESSMENT & PLAN NOTE
Uncontrolled  Patient was started on losartan by his primary care provider however patient did not start the medication because of concern of side effects  I advised the patient to be on low-sodium diet  Start half a dose of losartan see how his side effect will affect him  Orders:    Basic Metabolic Panel; Future    CBC WITHOUT DIFFERENTIAL; Future    MICROALB/CREAT RATIO RAND. UR

## 2025-05-27 NOTE — ASSESSMENT & PLAN NOTE
Orders:    Basic Metabolic Panel; Future    CBC WITHOUT DIFFERENTIAL; Future    MICROALB/CREAT RATIO RAND. UR

## 2025-07-11 RX ORDER — FENOFIBRATE 48 MG/1
48 TABLET, FILM COATED ORAL
Qty: 100 TABLET | Refills: 3 | Status: SHIPPED | OUTPATIENT
Start: 2025-07-11

## 2025-07-11 RX ORDER — LEVOTHYROXINE SODIUM 50 UG/1
50 TABLET ORAL
Qty: 100 TABLET | Refills: 3 | Status: SHIPPED | OUTPATIENT
Start: 2025-07-11 | End: 2025-07-26 | Stop reason: SDUPTHER

## 2025-08-22 ENCOUNTER — APPOINTMENT (OUTPATIENT)
Dept: INTERNAL MEDICINE | Facility: IMAGING CENTER | Age: 80
End: 2025-08-22
Payer: MEDICARE

## 2025-08-27 ENCOUNTER — HOSPITAL ENCOUNTER (OUTPATIENT)
Dept: LAB | Facility: MEDICAL CENTER | Age: 80
End: 2025-08-27
Attending: FAMILY MEDICINE
Payer: MEDICARE

## 2025-08-27 DIAGNOSIS — N18.4 CONTROLLED TYPE 2 DIABETES MELLITUS WITH STAGE 4 CHRONIC KIDNEY DISEASE, WITHOUT LONG-TERM CURRENT USE OF INSULIN (HCC): ICD-10-CM

## 2025-08-27 DIAGNOSIS — E11.22 CONTROLLED TYPE 2 DIABETES MELLITUS WITH STAGE 4 CHRONIC KIDNEY DISEASE, WITHOUT LONG-TERM CURRENT USE OF INSULIN (HCC): ICD-10-CM

## 2025-08-27 DIAGNOSIS — E78.2 MIXED HYPERLIPIDEMIA: ICD-10-CM

## 2025-08-27 LAB
ALBUMIN SERPL BCP-MCNC: 4.2 G/DL (ref 3.2–4.9)
ALBUMIN/GLOB SERPL: 1.6 G/DL
ALP SERPL-CCNC: 66 U/L (ref 30–99)
ALT SERPL-CCNC: 19 U/L (ref 2–50)
ANION GAP SERPL CALC-SCNC: 11 MMOL/L (ref 7–16)
AST SERPL-CCNC: 21 U/L (ref 12–45)
BILIRUB SERPL-MCNC: 0.4 MG/DL (ref 0.1–1.5)
BUN SERPL-MCNC: 45 MG/DL (ref 8–22)
CALCIUM ALBUM COR SERPL-MCNC: 9.2 MG/DL (ref 8.5–10.5)
CALCIUM SERPL-MCNC: 9.4 MG/DL (ref 8.5–10.5)
CHLORIDE SERPL-SCNC: 113 MMOL/L (ref 96–112)
CHOLEST SERPL-MCNC: 100 MG/DL (ref 100–199)
CO2 SERPL-SCNC: 16 MMOL/L (ref 20–33)
CREAT SERPL-MCNC: 3.19 MG/DL (ref 0.5–1.4)
EST. AVERAGE GLUCOSE BLD GHB EST-MCNC: 123 MG/DL
FASTING STATUS PATIENT QL REPORTED: NORMAL
GFR SERPLBLD CREATININE-BSD FMLA CKD-EPI: 19 ML/MIN/1.73 M 2
GLOBULIN SER CALC-MCNC: 2.7 G/DL (ref 1.9–3.5)
GLUCOSE SERPL-MCNC: 93 MG/DL (ref 65–99)
HBA1C MFR BLD: 5.9 % (ref 4–5.6)
HDLC SERPL-MCNC: 35 MG/DL
LDLC SERPL CALC-MCNC: 47 MG/DL
POTASSIUM SERPL-SCNC: 4.8 MMOL/L (ref 3.6–5.5)
PROT SERPL-MCNC: 6.9 G/DL (ref 6–8.2)
SODIUM SERPL-SCNC: 140 MMOL/L (ref 135–145)
TRIGL SERPL-MCNC: 90 MG/DL (ref 0–149)

## 2025-08-27 PROCEDURE — 80053 COMPREHEN METABOLIC PANEL: CPT

## 2025-08-27 PROCEDURE — 80061 LIPID PANEL: CPT

## 2025-08-27 PROCEDURE — 36415 COLL VENOUS BLD VENIPUNCTURE: CPT

## 2025-08-27 PROCEDURE — 83036 HEMOGLOBIN GLYCOSYLATED A1C: CPT | Mod: GA

## 2025-08-29 ENCOUNTER — OFFICE VISIT (OUTPATIENT)
Dept: INTERNAL MEDICINE | Facility: IMAGING CENTER | Age: 80
End: 2025-08-29
Payer: MEDICARE

## 2025-08-29 VITALS
WEIGHT: 168.4 LBS | HEIGHT: 69 IN | TEMPERATURE: 97.9 F | HEART RATE: 69 BPM | SYSTOLIC BLOOD PRESSURE: 130 MMHG | BODY MASS INDEX: 24.94 KG/M2 | DIASTOLIC BLOOD PRESSURE: 70 MMHG | OXYGEN SATURATION: 97 %

## 2025-08-29 DIAGNOSIS — E78.2 MIXED HYPERLIPIDEMIA: ICD-10-CM

## 2025-08-29 DIAGNOSIS — I10 PRIMARY HYPERTENSION: ICD-10-CM

## 2025-08-29 DIAGNOSIS — E11.22 CONTROLLED TYPE 2 DIABETES MELLITUS WITH STAGE 4 CHRONIC KIDNEY DISEASE, WITHOUT LONG-TERM CURRENT USE OF INSULIN (HCC): Primary | ICD-10-CM

## 2025-08-29 DIAGNOSIS — N18.4 CKD (CHRONIC KIDNEY DISEASE) STAGE 4, GFR 15-29 ML/MIN (HCC): ICD-10-CM

## 2025-08-29 DIAGNOSIS — N18.4 CONTROLLED TYPE 2 DIABETES MELLITUS WITH STAGE 4 CHRONIC KIDNEY DISEASE, WITHOUT LONG-TERM CURRENT USE OF INSULIN (HCC): Primary | ICD-10-CM

## 2025-08-29 RX ORDER — GLIMEPIRIDE 1 MG/1
1 TABLET ORAL
Qty: 45 TABLET | Refills: 3 | Status: SHIPPED | OUTPATIENT
Start: 2025-08-29

## 2025-08-29 ASSESSMENT — FIBROSIS 4 INDEX: FIB4 SCORE: 1.98

## (undated) DEVICE — WATER IRRIG. STER 3000 ML - (4/CA)

## (undated) DEVICE — SET IRRIGATION CYSTOSCOPY Y-TYPE L81 IN (20EA/CA)

## (undated) DEVICE — CATHETER URET OPEN END 6FR (10EA/BX)

## (undated) DEVICE — SODIUM CHL. IRRIGATION 0.9% 3000ML (4EA/CA 65CA/PF)

## (undated) DEVICE — WATER IRRIG. STER. 1500 ML - (9/CA)

## (undated) DEVICE — KIT ANESTHESIA W/CIRCUIT & 3/LT BAG W/FILTER (20EA/CA)

## (undated) DEVICE — ELECTRODE 850 FOAM ADHESIVE - HYDROGEL RADIOTRNSPRNT (50/PK)

## (undated) DEVICE — BAG URODRAIN WITH TUBING - (20/CA)

## (undated) DEVICE — GLOVE BIOGEL SZ 6.5 SURGICAL PF LTX (50PR/BX 4BX/CA)

## (undated) DEVICE — SET EXTENSION WITH 2 PORTS (48EA/CA) ***PART #2C8610 IS A SUBSTITUTE*****

## (undated) DEVICE — GLOVE BIOGEL INDICATOR SZ 6.5 SURGICAL PF LTX - (50PR/BX 4BX/CA)

## (undated) DEVICE — BASKET ZERO TIP

## (undated) DEVICE — SHEATH NAVIGATOR 11/13 X 46 URETERAL ACCESS (5EA/BX)

## (undated) DEVICE — COVER FOOT UNIVERSAL DISP. - (25EA/CA)

## (undated) DEVICE — GOWN WARMING STANDARD FLEX - (30/CA)

## (undated) DEVICE — SENSOR SPO2 NEO LNCS ADHESIVE (20/BX) SEE USER NOTES

## (undated) DEVICE — GOWN SURGICAL X-LARGE ULTRA - FILM-REINFORCED (20/CA)

## (undated) DEVICE — SET LEADWIRE 5 LEAD BEDSIDE DISPOSABLE ECG (1SET OF 5/EA)

## (undated) DEVICE — CATHETER URETHRAL OPEN END AXXCESS (10EA/BX)

## (undated) DEVICE — MEDICINE CUP STERILE 2 OZ - (100/CA)

## (undated) DEVICE — CATHETER URET DUAL LUMEN

## (undated) DEVICE — NEPTUNE 4 PORT MANIFOLD - (20/PK)

## (undated) DEVICE — MASK, LARYNGEAL AIRWAY #4

## (undated) DEVICE — PROTECTOR ULNA NERVE - (36PR/CA)

## (undated) DEVICE — HEAD HOLDER JUNIOR/ADULT

## (undated) DEVICE — GLOVE BIOGEL SZ 7.5 SURGICAL PF LTX - (50PR/BX 4BX/CA)

## (undated) DEVICE — CONNECTOR HOSE NEPTUNE FOR CYSTO ROOM

## (undated) DEVICE — LASER TRAC TIP 200 MIRCON FOR 100 WATT LASER

## (undated) DEVICE — PACK CYSTOSCOPY III - (8/CA)

## (undated) DEVICE — GLOVE SZ 6.5 BIOGEL PI MICRO - PF LF (50PR/BX)

## (undated) DEVICE — SPONGE GAUZESTER 4 X 4 4PLY - (128PK/CA)

## (undated) DEVICE — KIT ROOM DECONTAMINATION

## (undated) DEVICE — MASK ANESTHESIA ADULT  - (100/CA)

## (undated) DEVICE — SYRINGE DISP. 12 CC LL - (100/BX)

## (undated) DEVICE — SHEATH NAVIGATOR 11/13 X 36 URETERAL ACCESS

## (undated) DEVICE — LACTATED RINGERS INJ 1000 ML - (14EA/CA 60CA/PF)

## (undated) DEVICE — TUBE CONNECT SUCTION CLEAR 120 X 1/4" (50EA/CA)"

## (undated) DEVICE — TUBING CLEARLINK DUO-VENT - C-FLO (48EA/CA)

## (undated) DEVICE — GOWN SURGEONS X-LARGE - DISP. (30/CA)

## (undated) DEVICE — SUCTION INSTRUMENT YANKAUER BULBOUS TIP W/O VENT (50EA/CA)

## (undated) DEVICE — GLOVE BIOGEL PI INDICATOR SZ 6.5 SURGICAL PF LF - (50/BX 4BX/CA)

## (undated) DEVICE — SCOPE DIGITAL URETEROSCOPE DISPOSABLE

## (undated) DEVICE — JELLY, KY 2 0Z STERILE

## (undated) DEVICE — CONTAINER SPECIMEN BAG OR - STERILE 4 OZ W/LID (100EA/CA)

## (undated) DEVICE — WIRE GUIDE SENSOR DUAL FLEX - 5/BX

## (undated) DEVICE — SLEEVE, VASO, THIGH, MED